# Patient Record
Sex: FEMALE | Race: BLACK OR AFRICAN AMERICAN | NOT HISPANIC OR LATINO | Employment: FULL TIME | URBAN - METROPOLITAN AREA
[De-identification: names, ages, dates, MRNs, and addresses within clinical notes are randomized per-mention and may not be internally consistent; named-entity substitution may affect disease eponyms.]

---

## 2018-10-08 ENCOUNTER — OFFICE VISIT (OUTPATIENT)
Dept: HEMATOLOGY/ONCOLOGY | Facility: CLINIC | Age: 43
End: 2018-10-08
Payer: COMMERCIAL

## 2018-10-08 ENCOUNTER — TELEPHONE (OUTPATIENT)
Dept: HEMATOLOGY/ONCOLOGY | Facility: CLINIC | Age: 43
End: 2018-10-08

## 2018-10-08 VITALS
TEMPERATURE: 98 F | HEART RATE: 60 BPM | WEIGHT: 244.81 LBS | RESPIRATION RATE: 20 BRPM | HEIGHT: 64 IN | SYSTOLIC BLOOD PRESSURE: 100 MMHG | DIASTOLIC BLOOD PRESSURE: 68 MMHG | BODY MASS INDEX: 41.79 KG/M2

## 2018-10-08 DIAGNOSIS — Z13.228 SCREENING FOR ENDOCRINE, NUTRITIONAL, METABOLIC AND IMMUNITY DISORDER: ICD-10-CM

## 2018-10-08 DIAGNOSIS — Z13.29 SCREENING FOR ENDOCRINE, NUTRITIONAL, METABOLIC AND IMMUNITY DISORDER: ICD-10-CM

## 2018-10-08 DIAGNOSIS — L50.9 HIVES OF UNKNOWN ORIGIN: ICD-10-CM

## 2018-10-08 DIAGNOSIS — D50.8 IRON DEFICIENCY ANEMIA SECONDARY TO INADEQUATE DIETARY IRON INTAKE: Primary | ICD-10-CM

## 2018-10-08 DIAGNOSIS — Z13.21 SCREENING FOR ENDOCRINE, NUTRITIONAL, METABOLIC AND IMMUNITY DISORDER: ICD-10-CM

## 2018-10-08 DIAGNOSIS — Z98.84 STATUS POST BARIATRIC SURGERY: ICD-10-CM

## 2018-10-08 DIAGNOSIS — Z13.0 SCREENING FOR ENDOCRINE, NUTRITIONAL, METABOLIC AND IMMUNITY DISORDER: ICD-10-CM

## 2018-10-08 DIAGNOSIS — D50.8 IRON DEFICIENCY ANEMIA FOLLOWING BARIATRIC SURGERY: ICD-10-CM

## 2018-10-08 DIAGNOSIS — K95.89 IRON DEFICIENCY ANEMIA FOLLOWING BARIATRIC SURGERY: ICD-10-CM

## 2018-10-08 PROCEDURE — 3008F BODY MASS INDEX DOCD: CPT | Mod: ,,, | Performed by: INTERNAL MEDICINE

## 2018-10-08 PROCEDURE — 99204 OFFICE O/P NEW MOD 45 MIN: CPT | Mod: ,,, | Performed by: INTERNAL MEDICINE

## 2018-10-08 RX ORDER — OLMESARTAN MEDOXOMIL AND HYDROCHLOROTHIAZIDE 40/25 40; 25 MG/1; MG/1
TABLET ORAL
Refills: 1 | COMMUNITY
Start: 2018-09-05 | End: 2018-12-14 | Stop reason: SDUPTHER

## 2018-10-08 RX ORDER — OLMESARTAN MEDOXOMIL AND HYDROCHLOROTHIAZIDE 40/25 40; 25 MG/1; MG/1
1 TABLET ORAL DAILY
COMMUNITY
Start: 2018-09-14 | End: 2019-02-15 | Stop reason: RX

## 2018-10-08 RX ORDER — ESOMEPRAZOLE MAGNESIUM 40 MG/1
CAPSULE, DELAYED RELEASE ORAL
COMMUNITY
End: 2019-03-04 | Stop reason: SDUPTHER

## 2018-10-08 RX ORDER — DIPHENHYDRAMINE HCL 25 MG
CAPSULE ORAL
COMMUNITY
Start: 2018-09-14 | End: 2019-02-26

## 2018-10-08 RX ORDER — PROPRANOLOL HYDROCHLORIDE 160 MG/1
CAPSULE, EXTENDED RELEASE ORAL
COMMUNITY
Start: 2018-09-14 | End: 2019-03-04 | Stop reason: SDUPTHER

## 2018-10-08 NOTE — LETTER
October 8, 2018      Ana See, FNP  [address]  [place] [address]           [facility] - Hematology Oncology  [address] [facility]  [address] [place] [address]  Phone: [phone]  Fax: [phone]          Patient: Kitty Jennings   MR Number: [id number]   YOB: 1975   Date of Visit: 10/8/2018       Dear Ana See:    Thank you for referring Kitty Jennings to me for evaluation. Attached you will find relevant portions of my assessment and plan of care.    If you have questions, please do not hesitate to call me. I look forward to following Kitty Jennings along with you.    Sincerely,    MARCIA Rolle MD    Enclosure  CC:  No Recipients    If you would like to receive this communication electronically, please contact [email or web address] or [phone] to request more information on [employer] Link access.    For providers and/or their staff who would like to refer a patient to [facility], please contact us through our one-stop-shop provider referral line, [facility], at [phone].    If you feel you have received this communication in error or would no longer like to receive these types of communications, please e-mail [email or web address]

## 2018-10-09 NOTE — PROGRESS NOTES
[facility] History & Physical    Subjective:      Patient ID:   Kitty Jennings  43 y.o. female  1975  Ana See NP      Chief Complaint:   Anemia eval.    HPI:  43 y.o. female with hx of Fe deficiency anemia, treated with oral Fe in the past.  Admits to fatigue.  Energy 6/10.  Works, goes home and goes to bed.  Intermittent hives since age 19.  Zertec and benadryl prn.     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  ETOH no. Allergy no.    Hx HTN, GERD, migraine HA.  Menses NL flow.    Gastric sleave surgery 13.    M0  Menses onset 11  1st live child at 22    No family hx of anemia.  Dad HTN, DM  Mom A & W  Sister HTN x's 2    ROS:   GEN: normal without any fever, night sweats or weight loss  HEENT: normal with no HA's, sore throat, stiff neck, changes in vision  CV: normal with no CP, SOB, PND, KAM or orthopnea  PULM: normal with no SOB, cough, hemoptysis, sputum or pleuritic pain  GI: normal with no abdominal pain, nausea, vomiting, constipation, diarrhea, melanotic stools, BRBPR, or hematemesis  : normal with no hematuria, dysuria  BREAST: normal with no mass, discharge, pain  SKIN: normal with no rash, erythema, bruising, or swelling      Review of patient's allergies indicates:  No Known Allergies  Social History     Socioeconomic History    Marital status: Unknown     Spouse name: Not on file    Number of children: Not on file    Years of education: Not on file    Highest education level: Not on file   Social Needs    Financial resource strain: Not on file    Food insecurity - worry: Not on file    Food insecurity - inability: Not on file    Transportation needs - medical: Not on file    Transportation needs - non-medical: Not on file   Occupational History    Not on file   Tobacco Use    Smoking status: Never Smoker    Smokeless tobacco: Never Used   Substance and Sexual Activity    Alcohol use: No     Frequency: Never    Drug use: No    Sexual activity: Not on  "file   Other Topics Concern    Not on file   Social History Narrative    Not on file         Current Outpatient Medications:     cetirizine (ZYRTEC) 10 mg Cap, Zyrtec 10 mg capsule  Take by oral route., Disp: , Rfl:     diphenhydrAMINE (BENADRYL) 25 mg capsule, Benadryl 25 mg capsule  Take 2 capsules every 4 hours by oral route., Disp: , Rfl:     esomeprazole (NEXIUM) 40 MG capsule, Nexium 40 mg capsule,delayed release  TAKE ONE CAPSULE BY MOUTH DAILY, Disp: , Rfl:     olmesartan-hydrochlorothiazide (BENICAR HCT) 40-25 mg per tablet, TK 1 T PO D., Disp: , Rfl: 1    olmesartan-hydrochlorothiazide (BENICAR HCT) 40-25 mg per tablet, , Disp: , Rfl:     propranolol (INDERAL LA) 160 mg 24 hr capsule, propranolol  mg capsule,24 hr,extended release, Disp: , Rfl:           Objective:   Vitals:  Blood pressure 100/68, pulse 60, temperature 98 °F (36.7 °C), resp. rate 20, height 5' 3.5" (1.613 m), weight 111 kg (244 lb 12.8 oz).    She did not aggree to physical exam.    Hgb8.7. Ferritin 10.  Assessment:   (1) 43 y.o. female with diagnosis of Fe deficiency 2nd decreased absorption 2nd bariatric surgery.  Discussed oral Fe or Injectafer  Weekly x's 2 to replenish Fe stores.  Estimated 1-2% risk of reaction   To Injectafer infusion.  Orders at [facility].  Check lab and RTC 2 months.    (2)Refer to Dr. Lori Chavez of allergy/ immunology to try clarify hives and Rx.    RTC 2 months.        1. Iron deficiency anemia secondary to inadequate dietary iron intake    2. Status post bariatric surgery    3. Screening for endocrine, nutritional, metabolic and immunity disorder    4. Hives of unknown origin        Plan:       Iron deficiency anemia secondary to inadequate dietary iron intake  -     Vitamin B12; Future; Expected date: 10/08/2018  -     Vitamin B6; Future; Expected date: 10/08/2018  -     Folate; Future; Expected date: 10/08/2018  -     TSH w/reflex to FT4; Future; Expected date: " 10/08/2018    Status post bariatric surgery  -     Vitamin B12; Future; Expected date: 10/08/2018  -     Vitamin B6; Future; Expected date: 10/08/2018  -     Folate; Future; Expected date: 10/08/2018  -     TSH w/reflex to FT4; Future; Expected date: 10/08/2018    Screening for endocrine, nutritional, metabolic and immunity disorder  -     Vitamin B12; Future; Expected date: 10/08/2018  -     Vitamin B6; Future; Expected date: 10/08/2018  -     Folate; Future; Expected date: 10/08/2018  -     TSH w/reflex to FT4; Future; Expected date: 10/08/2018    Hives of unknown origin      Follow-up in about 2 months (around 12/8/2018) for check of blood status after therapy.

## 2018-10-11 LAB
FOLATE SERPL-MCNC: 8.5 NG/ML
T4 FREE SERPL-MCNC: 1.01 NG/DL (ref 0.82–1.77)
TSH SERPL DL<=0.005 MIU/L-ACNC: 1.31 UIU/ML (ref 0.45–4.5)
VIT B12 SERPL-MCNC: 931 PG/ML (ref 232–1245)
VIT B6 SERPL-MCNC: 4.4 UG/L (ref 2–32.8)

## 2018-12-13 ENCOUNTER — OFFICE VISIT (OUTPATIENT)
Dept: HEMATOLOGY/ONCOLOGY | Facility: CLINIC | Age: 43
End: 2018-12-13
Payer: COMMERCIAL

## 2018-12-13 VITALS
SYSTOLIC BLOOD PRESSURE: 105 MMHG | BODY MASS INDEX: 39.7 KG/M2 | HEART RATE: 99 BPM | TEMPERATURE: 98 F | WEIGHT: 247 LBS | HEIGHT: 66 IN | DIASTOLIC BLOOD PRESSURE: 63 MMHG

## 2018-12-13 DIAGNOSIS — K95.89 IRON DEFICIENCY ANEMIA FOLLOWING BARIATRIC SURGERY: Primary | ICD-10-CM

## 2018-12-13 DIAGNOSIS — L50.8 HIVES, PHYSICAL: ICD-10-CM

## 2018-12-13 DIAGNOSIS — D50.8 IRON DEFICIENCY ANEMIA FOLLOWING BARIATRIC SURGERY: Primary | ICD-10-CM

## 2018-12-13 PROCEDURE — 3008F BODY MASS INDEX DOCD: CPT | Mod: ,,, | Performed by: INTERNAL MEDICINE

## 2018-12-13 PROCEDURE — 99214 OFFICE O/P EST MOD 30 MIN: CPT | Mod: ,,, | Performed by: INTERNAL MEDICINE

## 2018-12-13 NOTE — LETTER
December 15, 2018      Ana See, FNP  [address]  [place] [address]           [facility] Hematology Oncology  [address] [address]  [place] [address]  Phone: [phone]  Fax: [phone]          Patient: Kitty Jennings   MR Number: [id number]   YOB: 1975   Date of Visit: 12/13/2018       Dear Ana See:    Thank you for referring Kitty Jennings to me for evaluation. Attached you will find relevant portions of my assessment and plan of care.    If you have questions, please do not hesitate to call me. I look forward to following Kitty Jennings along with you.    Sincerely,    MARCIA Rolle MD    Enclosure  CC:  No Recipients    If you would like to receive this communication electronically, please contact [email or web address] or [phone] to request more information on [employer] Link access.    For providers and/or their staff who would like to refer a patient to [facility], please contact us through our one-stop-shop provider referral line, [facility], at [phone].    If you feel you have received this communication in error or would no longer like to receive these types of communications, please e-mail [email or web address]

## 2018-12-15 NOTE — PROGRESS NOTES
[facility] History & Physical    Subjective:      Patient ID:   Kitty Jennings  43 y.o. female  1975  Ana See NP      Chief Complaint:   Anemia eval.    HPI:  43 y.o. female with hx of Fe deficiency anemia, treated with oral Fe in the past.  Admits to fatigue.  Energy /10.  Works, goes home and goes to bed.  Intermittent hives since age 19.  Zertec and benadryl prn.    She completed Injectafer x's 2 weeks.  Stronger.  Hive sx have improved.     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 Allergy no.    Hx HTN, GERD, migraine HA.  Menses NL flow.    Gastric sleave surgery 13.    M0  Menses onset 11  1st live child at 22    No family hx of anemia.  Dad HTN, DM  Mom A & W  Sister HTN x's 2    ROS:   GEN: normal without any fever, night sweats or weight loss  HEENT: normal with no HA's, sore throat, stiff neck, changes in vision  CV: normal with no CP, SOB, PND, KAM or orthopnea  PULM: normal with no SOB, cough, hemoptysis, sputum or pleuritic pain  GI: normal with no abdominal pain, nausea, vomiting, constipation, diarrhea, melanotic stools, BRBPR, or hematemesis  : normal with no hematuria, dysuria  BREAST: normal with no mass, discharge, pain  SKIN: see HPI      Review of patient's allergies indicates:  No Known Allergies  Social History     Socioeconomic History    Marital status: Unknown     Spouse name: Not on file    Number of children: Not on file    Years of education: Not on file    Highest education level: Not on file   Social Needs    Financial resource strain: Not on file    Food insecurity - worry: Not on file    Food insecurity - inability: Not on file    Transportation needs - medical: Not on file    Transportation needs - non-medical: Not on file   Occupational History    Not on file   Tobacco Use    Smoking status: Never Smoker    Smokeless tobacco: Never Used   Substance and Sexual Activity    Alcohol use: No     Frequency: Never    Drug use:  "No    Sexual activity: Not on file   Other Topics Concern    Not on file   Social History Narrative    Not on file         Current Outpatient Medications:     cetirizine (ZYRTEC) 10 mg Cap, Zyrtec 10 mg capsule  Take by oral route., Disp: , Rfl:     diphenhydrAMINE (BENADRYL) 25 mg capsule, Benadryl 25 mg capsule  Take 2 capsules every 4 hours by oral route., Disp: , Rfl:     esomeprazole (NEXIUM) 40 MG capsule, Nexium 40 mg capsule,delayed release  TAKE ONE CAPSULE BY MOUTH DAILY, Disp: , Rfl:     olmesartan-hydrochlorothiazide (BENICAR HCT) 40-25 mg per tablet, , Disp: , Rfl:     propranolol (INDERAL LA) 160 mg 24 hr capsule, propranolol  mg capsule,24 hr,extended release, Disp: , Rfl:           Objective:   Vitals:  Blood pressure 105/63, pulse 99, temperature 98.2 °F (36.8 °C), height 5' 6" (1.676 m), weight 112 kg (247 lb).    She did not aggree to physical exam.    Hgb8.7. Ferritin 10.  Assessment:   (1) 43 y.o. female with diagnosis of Fe deficiency 2nd decreased absorption 2nd bariatric surgery.  Gave  Injectafer  Weekly x's 2 to replenish Fe stores.  Estimated 1-2% risk of reaction, she tolerated it well.  [facility].  Check lab and RTC 3 months.    (2)Refer to Dr. Lori Chavez of allergy/ immunology to try clarify hives and Rx.    RTC 3 months.        1. Iron deficiency anemia following bariatric surgery    2. Hives, physical        Plan:       Iron deficiency anemia following bariatric surgery  -     Ferritin; Future; Expected date: 12/13/2018  -     CBC auto differential; Future; Expected date: 12/13/2018    Hives, physical  -     Ambulatory Referral to Immunology      Follow-up in about 3 months (around 3/13/2019) for check of blood status after therapy.          "

## 2018-12-19 ENCOUNTER — DOCUMENTATION ONLY (OUTPATIENT)
Dept: FAMILY MEDICINE | Facility: CLINIC | Age: 43
End: 2018-12-19

## 2018-12-19 NOTE — PROGRESS NOTES
Pre-Visit Chart Review  For Appointment Scheduled on 12/21/2018    Health Maintenance Due   Topic Date Due    Lipid Panel  1975    TETANUS VACCINE  06/15/1993    Pap Smear with HPV Cotest  06/15/1996    Mammogram  06/15/2015    Influenza Vaccine  08/01/2018

## 2018-12-21 ENCOUNTER — OFFICE VISIT (OUTPATIENT)
Dept: FAMILY MEDICINE | Facility: CLINIC | Age: 43
End: 2018-12-21
Payer: COMMERCIAL

## 2018-12-21 VITALS
DIASTOLIC BLOOD PRESSURE: 71 MMHG | TEMPERATURE: 98 F | HEIGHT: 66 IN | HEART RATE: 55 BPM | WEIGHT: 244.69 LBS | BODY MASS INDEX: 39.32 KG/M2 | SYSTOLIC BLOOD PRESSURE: 108 MMHG

## 2018-12-21 DIAGNOSIS — Z12.31 VISIT FOR SCREENING MAMMOGRAM: ICD-10-CM

## 2018-12-21 DIAGNOSIS — I10 GOOD HYPERTENSION CONTROL: Primary | ICD-10-CM

## 2018-12-21 DIAGNOSIS — E66.01 SEVERE OBESITY (BMI 35.0-39.9) WITH COMORBIDITY: ICD-10-CM

## 2018-12-21 DIAGNOSIS — L50.9 HIVES OF UNKNOWN ORIGIN: ICD-10-CM

## 2018-12-21 PROCEDURE — 99204 OFFICE O/P NEW MOD 45 MIN: CPT | Mod: S$GLB,,, | Performed by: PHYSICIAN ASSISTANT

## 2018-12-21 PROCEDURE — 3008F BODY MASS INDEX DOCD: CPT | Mod: CPTII,S$GLB,, | Performed by: PHYSICIAN ASSISTANT

## 2018-12-21 PROCEDURE — 99999 PR PBB SHADOW E&M-EST. PATIENT-LVL IV: CPT | Mod: PBBFAC,,, | Performed by: PHYSICIAN ASSISTANT

## 2018-12-21 NOTE — PATIENT INSTRUCTIONS
Weight Management: Getting Started  Healthy bodies come in all shapes and sizes. Not all bodies are made to be thin. For some people, a healthy weight is higher than the average weight listed on weight charts. Your healthcare provider can help you decide on a healthy weight for you.    Reasons to lose weight  Losing weight can help with some health problems, such as high blood pressure, heart disease, diabetes, sleep apnea, and arthritis. You may also feel more energy.  Set your long-term goal  Your goal doesn't even have to be a specific weight. You may decide on a fitness goal (such as being able to walk 10 miles a week), or a health goal (such as lowering your blood pressure). Choose a goal that is measurable and reasonable, so you know when you've reached it. A goal of reaching a BMI of less than 25 is not always reasonable (or possible).   Make an action plan  Habits dont change overnight. Setting your goals too high can leave you feeling discouraged if you cant reach them. Be realistic. Choose one or two small changes you can make now. Set an action plan for how you are going to make these changes. When you can stick to this plan, keep making a few more small changes. Taking small steps will help you stay on the path to success.  Track your progress  Write down your goals. Then, keep a daily record of your progress. Write down what you eat and how active you are. This record lets you look back on how much youve done. It may also help when youre feeling frustrated. Reward yourself for success. Even if you dont reach every goal, give yourself credit for what you do get done.  Get support  Encouragement from others can help make losing weight easier. Ask your family members and friends for support. They may even want to join you. Also look to your healthcare provider, registered dietitian, and  for help. Your local hospital can give you more information about nutrition, exercise, and  weight loss.  Date Last Reviewed: 1/31/2016 © 2000-2017 [employer]. [address] [place] [address], [place] [address]. All rights reserved. This information is not intended as a substitute for professional medical care. Always follow your healthcare professional's instructions.        Walking for Fitness  Fitness walking has something for everyone, even people who are already fit. Walking is one of the safest ways to condition your body aerobically. It can boost energy, help you lose weight, and reduce stress.    Physical benefits  · Walking strengthens your heart and lungs, and tones your muscles.  · When walking, your feet land with less impact than in other sports. This reduces chances of muscle, bone, and joint injury.  · Regular walking improves your cholesterol levels and lowers your risk of heart disease. And it helps you control your blood sugar if you have diabetes.  · Walking is a weight-bearing activity, which helps maintain bone density. This can help prevent osteoporosis.  Personal rewards  · Taking walks can help you relax and manage stress. And fitness walking may make you feel better about yourself.  · Walking can help you sleep better at night and make you less likely to be depressed.  · Regular walking may help maintain your memory as you get older.  · Walking is a great way to spend extra time with friends and family members. Be sure to invite your dog along!  Q&A about fitness walking  Q: Will walking keep me fit?  A: Yes. Regular walking at the right pace gives you all the benefits of other aerobic activities, such as jogging and swimming.  Q: Will walking help me lose weight and keep it off?  A: Yes. Per mile, walking can burn as many calories as jogging. Your health care provider can help work walking into your weight-loss plan.  Q: Is walking safe for my health?  A: Yes. Walking is safe if you have high blood pressure, diabetes, heart disease, or other conditions. Talk to your  healthcare provider before you start.  Date Last Reviewed: 4/1/2017  © 1330-9176 [employer], [employer]. [address] [place] [address], [place] [address]. All rights reserved. This information is not intended as a substitute for professional medical care. Always follow your healthcare professional's instructions.

## 2018-12-21 NOTE — PROGRESS NOTES
Subjective:       Patient ID: Kitty Jennings is a 43 y.o. female.    Chief Complaint: Hypertension    Patient is a new patient in clinic.  She has she has had blood work recently with her previous PCP.  She declines all vaccines but does agree to a mammogram.  She states she has a GYN in [place] but does not remember his name and will get her last pap results      Current Outpatient Medications:     cetirizine (ZYRTEC) 10 mg Cap, Zyrtec 10 mg capsule  Take by oral route., Disp: , Rfl:     diphenhydrAMINE (BENADRYL) 25 mg capsule, Benadryl 25 mg capsule  Take 2 capsules every 4 hours by oral route., Disp: , Rfl:     esomeprazole (NEXIUM) 40 MG capsule, Nexium 40 mg capsule,delayed release  TAKE ONE CAPSULE BY MOUTH DAILY, Disp: , Rfl:     olmesartan-hydrochlorothiazide (BENICAR HCT) 40-25 mg per tablet, Take 1 tablet by mouth once daily. , Disp: , Rfl:     propranolol (INDERAL LA) 160 mg 24 hr capsule, propranolol  mg capsule,24 hr,extended release, Disp: , Rfl:     Past Medical History:  No date: Anemia  No date: Hives of unknown origin  No date: Hx of migraines  No date: Hypertension    Review of patient's family history indicates:  Problem: Hypertension      Relation: Mother          Age of Onset: (Not Specified)  Problem: Diabetes type II      Relation: Mother          Age of Onset: (Not Specified)  Problem: Diverticulosis      Relation: Mother          Age of Onset: (Not Specified)  Problem: Hypertension      Relation: Father          Age of Onset: (Not Specified)  Problem: Heart disease      Relation: Father          Age of Onset: (Not Specified)  Problem: Diabetes type II      Relation: Father          Age of Onset: (Not Specified)  Problem: Hypertension      Relation: Sister          Age of Onset: (Not Specified)  Problem: Hypertension      Relation: Brother          Age of Onset: (Not Specified)  Problem: Ovarian cancer      Relation: Sister          Age of Onset: (Not Specified)  Problem: No  Known Problems      Relation: Sister          Age of Onset: (Not Specified)  Problem: No Known Problems      Relation: Daughter          Age of Onset: (Not Specified)    Past Surgical History:  No date: BREAST SURGERY      Comment:  reduction  No date: gastric sleeve  No date: lipoma removal      Review of Systems   Constitutional: Negative for activity change, appetite change, chills, fatigue, fever and unexpected weight change.   HENT: Negative for congestion, dental problem, hearing loss, postnasal drip, rhinorrhea, sinus pressure and trouble swallowing.    Eyes: Negative for photophobia, discharge and visual disturbance.   Respiratory: Negative for cough, chest tightness, shortness of breath and wheezing.    Cardiovascular: Negative for chest pain, palpitations and leg swelling.   Gastrointestinal: Negative for abdominal pain, blood in stool, constipation, diarrhea, nausea and vomiting.   Genitourinary: Negative for decreased urine volume, difficulty urinating, dyspareunia, dysuria, flank pain, frequency, genital sores, hematuria, menstrual problem, pelvic pain, urgency, vaginal bleeding, vaginal discharge and vaginal pain.   Musculoskeletal: Negative for arthralgias, back pain, joint swelling and neck pain.   Skin: Negative for color change and rash.   Neurological: Negative for dizziness, seizures, weakness, light-headedness, numbness and headaches.   Hematological: Does not bruise/bleed easily.   Psychiatric/Behavioral: Negative for sleep disturbance and suicidal ideas. The patient is not nervous/anxious.        Objective:      Physical Exam   Constitutional: She is oriented to person, place, and time. She appears well-developed and well-nourished.   HENT:   Head: Normocephalic and atraumatic.   Eyes: Conjunctivae are normal. Pupils are equal, round, and reactive to light.   Neck: Normal range of motion. Neck supple. No JVD present.   Cardiovascular: Normal rate and regular rhythm. Exam reveals no gallop and  no friction rub.   No murmur heard.  Pulmonary/Chest: Effort normal and breath sounds normal. No respiratory distress. She has no wheezes. She has no rales.   Neurological: She is alert and oriented to person, place, and time.   Skin: Skin is warm and dry.   Psychiatric: She has a normal mood and affect. Her behavior is normal. Judgment and thought content normal.       Assessment:       1. Visit for screening mammogram    2. Good hypertension control    3. Hives of unknown origin        Plan:       Kitty was seen today for hypertension.    Diagnoses and all orders for this visit:    Good hypertension control  Comments:  controlled, continue meds    Hives of unknown origin  Comments:  stable, Zantac at night and discontinue Zyrtec usage.  Benadryl as needed    Severe obesity (BMI 35.0-39.9) with comorbidity  Comments:  uncontrolled, encouraged diet    Visit for screening mammogram  -     Mammo Digital Screening Bilateral With CAD; Future    Patient readiness: acceptance and barriers:none    During the course of the visit the patient was educated and counseled about the following:     Hypertension:   Regular aerobic exercise.  Obesity:   General weight loss/lifestyle modification strategies discussed (elicit support from others; identify saboteurs; non-food rewards, etc).    Goals: Hypertension: Reduce Blood Pressure and Obesity: Reduce calorie intake and BMI    Did patient meet goals/outcomes: No    The following self management tools provided: blood pressure log  excercise log    Patient Instructions (the written plan) was given to the patient/family.     Time spent with patient: 45 minutes    Barriers to medications present (no )    Adverse reactions to current medications (no)    Over the counter medications reviewed (Yes)

## 2018-12-26 ENCOUNTER — HOSPITAL ENCOUNTER (OUTPATIENT)
Dept: RADIOLOGY | Facility: CLINIC | Age: 43
Discharge: HOME | End: 2018-12-26
Attending: PHYSICIAN ASSISTANT
Payer: COMMERCIAL

## 2018-12-26 DIAGNOSIS — Z12.31 VISIT FOR SCREENING MAMMOGRAM: ICD-10-CM

## 2018-12-26 PROCEDURE — 77067 SCR MAMMO BI INCL CAD: CPT | Mod: 26,,, | Performed by: RADIOLOGY

## 2018-12-26 PROCEDURE — 77063 BREAST TOMOSYNTHESIS BI: CPT | Mod: 26,,, | Performed by: RADIOLOGY

## 2018-12-26 PROCEDURE — 77067 SCR MAMMO BI INCL CAD: CPT | Mod: TC,PO

## 2018-12-26 PROCEDURE — 77063 BREAST TOMOSYNTHESIS BI: CPT | Mod: TC,PO

## 2018-12-27 DIAGNOSIS — N63.10 BREAST MASS, RIGHT: ICD-10-CM

## 2018-12-27 DIAGNOSIS — R92.8 ABNORMAL MAMMOGRAM: Primary | ICD-10-CM

## 2019-01-03 ENCOUNTER — OFFICE VISIT (OUTPATIENT)
Dept: ALLERGY | Facility: CLINIC | Age: 44
End: 2019-01-03
Payer: COMMERCIAL

## 2019-01-03 VITALS
BODY MASS INDEX: 44.12 KG/M2 | WEIGHT: 249 LBS | SYSTOLIC BLOOD PRESSURE: 128 MMHG | DIASTOLIC BLOOD PRESSURE: 76 MMHG | HEIGHT: 63 IN

## 2019-01-03 DIAGNOSIS — N63.0 BREAST MASS IN FEMALE: ICD-10-CM

## 2019-01-03 DIAGNOSIS — L50.8 CHRONIC URTICARIA: Primary | ICD-10-CM

## 2019-01-03 DIAGNOSIS — T78.3XXA ANGIOEDEMA, INITIAL ENCOUNTER: ICD-10-CM

## 2019-01-03 PROCEDURE — 99244 PR OFFICE CONSULTATION,LEVEL IV: ICD-10-PCS | Mod: ,,, | Performed by: ALLERGY & IMMUNOLOGY

## 2019-01-03 PROCEDURE — 99244 OFF/OP CNSLTJ NEW/EST MOD 40: CPT | Mod: ,,, | Performed by: ALLERGY & IMMUNOLOGY

## 2019-01-03 RX ORDER — CETIRIZINE HYDROCHLORIDE 10 MG/1
10 TABLET ORAL 2 TIMES DAILY
Qty: 60 TABLET | Refills: 3 | Status: SHIPPED | OUTPATIENT
Start: 2019-01-03 | End: 2019-06-23 | Stop reason: SDUPTHER

## 2019-01-03 RX ORDER — DOXEPIN HYDROCHLORIDE 10 MG/1
10 CAPSULE ORAL NIGHTLY PRN
Qty: 30 CAPSULE | Refills: 2 | Status: SHIPPED | OUTPATIENT
Start: 2019-01-03 | End: 2019-05-16

## 2019-01-03 NOTE — PATIENT INSTRUCTIONS
Park in the parking garage   Then enter on 2nd floor.   First office on the right = [address].     Start high does antihistamines.     Call [phone] push [phone].

## 2019-01-03 NOTE — LETTER
January 3, 2019      MARCIA Rolle MD  [address]  [address]  [place] [address]           [facility] - Allergy  [address]  [address]  [place] [address]  Phone: [phone]  Fax: [phone]          Patient: Kitty Jennings   MR Number: [id number]   YOB: 1975   Date of Visit: 1/3/2019       Dear Dr. MARCIA Rolle:    Thank you for referring Kitty Jennings to me for evaluation. Attached you will find relevant portions of my assessment and plan of care.    If you have questions, please do not hesitate to call me. I look forward to following Kitty Jennings along with you.    Sincerely,    Lori Chavez MD    Enclosure  CC:  No Recipients    If you would like to receive this communication electronically, please contact [email or web address] or [phone] to request more information on [employer] access.    For providers and/or their staff who would like to refer a patient to [facility], please contact us through our one-stop-shop provider referral line, [facility], at [phone].    If you feel you have received this communication in error or would no longer like to receive these types of communications, please e-mail [email or web address]

## 2019-01-03 NOTE — PROGRESS NOTES
"Subjective:       Patient ID: Kitty Jennings is a 43 y.o. female.    Chief Complaint: Urticaria (chronic hives - referred from Dr. Rolle)    HPI     Pt presents as a consult from Dr. Rolle for urticaria.     Started in her teenage years  Associated facial swelling with hives  Location generalized  Frequency comes and goes  Typically will go away after a few months  Recently started working for [employer] x 8 years.   Most recently, for the past 8 years, more frequent.   She would feel a deep itch.   Tx: benadryl daily.     Saw another allergist: Dr. Waldron 5 years ago.   Prior had allergy testing   Serum and skin test was negative.         Review of Systems      General: neg unexpected weight changes, fevers, chills, night sweats, malaise  HEENT: see hpi, Neg eye pain, vision changes, ear drainage, nose bleeds, throat tightness, sores in the mouth  CV: Neg chest pain, palpitations, swelling  Resp: see hpi, neg shortness of breath, hemoptysis, cough  GI: see hpi, neg dysphagia, night abdominal pain, reflux, chronic diarrhea, chronic constipation  Derm: See Hpi, neg new rash, neg flushing  Mu/sk: Neg joint pain, joint swelling   Psych: Neg anxiety  neuro: neg chronic headaches, muscle weakness  Endo: neg heat/cold intolerance, chronic fatigue    Objective:     Vitals:    01/03/19 1524   BP: 128/76   Weight: 112.9 kg (249 lb)   Height: 5' 3" (1.6 m)        Physical Exam      General: no acute distress, well developed well nourished   HEENT:   Head:normocephalic atraumatic  Eyes: DUSTY, EOMI, Neg injection, scleral icterus, or conjunctival papillary hypertrophy.  Ears: tm clear bilaterally, normal canal  Nose: nares patent   OP: mucus membranes moist, - cobblestoning, - PND, neg erythema or lesions  Neck: supple, Full range of motion, neg lymphadenopathy  Chest: full respiratory excursion no abnormal chest abnormality  Resp: clear to ascultation bilaterally  CV: RRR, neg MRG, brisk capillary refill  Abdomen: BS+, non " tender, non distended  Ext:  Neg clubbing, cyanosis, pitting edema  Skin: urticaria on left bicep  Lymph: neg supraclavicular, axillary     Assessment:       1. Chronic urticaria    2. Breast mass in female    3. Angioedema, initial encounter        Plan:       Chronic urticaria  -     doxepin (SINEQUAN) 10 MG capsule; Take 1 capsule (10 mg total) by mouth nightly as needed (breakthrough itch).  Dispense: 30 capsule; Refill: 2  -     cetirizine (ZYRTEC) 10 MG tablet; Take 1 tablet (10 mg total) by mouth 2 (two) times daily.  Dispense: 60 tablet; Refill: 3  -     ranitidine (ZANTAC) 150 MG tablet; Take 1 tablet (150 mg total) by mouth 2 (two) times daily.  Dispense: 60 tablet; Refill: 3    Breast mass in female    Angioedema, initial encounter    explained urticaria and action plan and education.   Consider xolair if her breast mass is not cancerous, if it is, will consult Dr. Rolle on his opinoin.     Start high dose antihistamines  Consider montelukast    Follow up in 4 weeks.         Lori Chavez M.D.  Allergy/Immunology  [facility]   [phone] phone  [phone] fax

## 2019-01-08 ENCOUNTER — HOSPITAL ENCOUNTER (OUTPATIENT)
Dept: RADIOLOGY | Facility: HOSPITAL | Age: 44
Discharge: HOME | End: 2019-01-08
Attending: PHYSICIAN ASSISTANT
Payer: COMMERCIAL

## 2019-01-08 DIAGNOSIS — R92.8 ABNORMAL MAMMOGRAM: ICD-10-CM

## 2019-01-08 DIAGNOSIS — N63.10 BREAST MASS, RIGHT: ICD-10-CM

## 2019-01-08 DIAGNOSIS — N63.10 BREAST MASS, RIGHT: Primary | ICD-10-CM

## 2019-01-08 PROCEDURE — 77061 BREAST TOMOSYNTHESIS UNI: CPT | Mod: 26,,, | Performed by: RADIOLOGY

## 2019-01-08 PROCEDURE — 76642 ULTRASOUND BREAST LIMITED: CPT | Mod: 26,RT,, | Performed by: RADIOLOGY

## 2019-01-08 PROCEDURE — 77061 BREAST TOMOSYNTHESIS UNI: CPT | Mod: TC

## 2019-01-08 PROCEDURE — 76642 ULTRASOUND BREAST LIMITED: CPT | Mod: TC,RT

## 2019-01-08 PROCEDURE — 77065 MAMMO DIGITAL DIAGNOSTIC RIGHT WITH TOMOSYNTHESIS_CAD: ICD-10-PCS | Mod: 26,,, | Performed by: RADIOLOGY

## 2019-01-08 PROCEDURE — 77065 DX MAMMO INCL CAD UNI: CPT | Mod: 26,,, | Performed by: RADIOLOGY

## 2019-01-08 PROCEDURE — 77061 MAMMO DIGITAL DIAGNOSTIC RIGHT WITH TOMOSYNTHESIS_CAD: ICD-10-PCS | Mod: 26,,, | Performed by: RADIOLOGY

## 2019-01-08 PROCEDURE — 77065 DX MAMMO INCL CAD UNI: CPT | Mod: TC

## 2019-01-08 PROCEDURE — 76642 US BREAST RIGHT LIMITED: ICD-10-PCS | Mod: 26,RT,, | Performed by: RADIOLOGY

## 2019-01-28 ENCOUNTER — OFFICE VISIT (OUTPATIENT)
Dept: HEMATOLOGY/ONCOLOGY | Facility: CLINIC | Age: 44
End: 2019-01-28
Payer: COMMERCIAL

## 2019-01-28 VITALS
WEIGHT: 248.31 LBS | HEART RATE: 55 BPM | SYSTOLIC BLOOD PRESSURE: 104 MMHG | BODY MASS INDEX: 43.98 KG/M2 | DIASTOLIC BLOOD PRESSURE: 67 MMHG | RESPIRATION RATE: 20 BRPM | TEMPERATURE: 99 F

## 2019-01-28 DIAGNOSIS — E53.1 PYRIDOXINE DEFICIENCY: ICD-10-CM

## 2019-01-28 DIAGNOSIS — K95.89 IRON DEFICIENCY ANEMIA FOLLOWING BARIATRIC SURGERY: Primary | ICD-10-CM

## 2019-01-28 DIAGNOSIS — D50.8 IRON DEFICIENCY ANEMIA FOLLOWING BARIATRIC SURGERY: Primary | ICD-10-CM

## 2019-01-28 PROCEDURE — 3008F BODY MASS INDEX DOCD: CPT | Mod: ,,, | Performed by: INTERNAL MEDICINE

## 2019-01-28 PROCEDURE — 99214 PR OFFICE/OUTPT VISIT, EST, LEVL IV, 30-39 MIN: ICD-10-PCS | Mod: ,,, | Performed by: INTERNAL MEDICINE

## 2019-01-28 PROCEDURE — 3008F PR BODY MASS INDEX (BMI) DOCUMENTED: ICD-10-PCS | Mod: ,,, | Performed by: INTERNAL MEDICINE

## 2019-01-28 PROCEDURE — 99214 OFFICE O/P EST MOD 30 MIN: CPT | Mod: ,,, | Performed by: INTERNAL MEDICINE

## 2019-01-28 NOTE — LETTER
January 28, 2019      ROMAN Villa  [address]  [place] [address]           [facility]  [address]  [address]  [place] [address]  Phone: [phone]  Fax: [phone]          Patient: Kitty Jennings   MR Number: [id number]   YOB: 1975   Date of Visit: 1/28/2019       Dear Olga Holland:    Thank you for referring Kitty Jennings to me for evaluation. Attached you will find relevant portions of my assessment and plan of care.    If you have questions, please do not hesitate to call me. I look forward to following Kitty Jennings along with you.    Sincerely,    MARCIA Rolle MD    Enclosure  CC:  No Recipients    If you would like to receive this communication electronically, please contact [email or web address] or [phone] to request more information on [employer] Link access.    For providers and/or their staff who would like to refer a patient to [facility], please contact us through our one-stop-shop provider referral line, [facility], at [phone].    If you feel you have received this communication in error or would no longer like to receive these types of communications, please e-mail [email or web address]

## 2019-01-30 ENCOUNTER — PATIENT MESSAGE (OUTPATIENT)
Dept: HEMATOLOGY/ONCOLOGY | Facility: CLINIC | Age: 44
End: 2019-01-30

## 2019-02-06 LAB
BASOPHILS # BLD AUTO: 0 X10E3/UL (ref 0–0.2)
BASOPHILS NFR BLD AUTO: 0 %
EOSINOPHIL # BLD AUTO: 0.1 X10E3/UL (ref 0–0.4)
EOSINOPHIL NFR BLD AUTO: 3 %
ERYTHROCYTE [DISTWIDTH] IN BLOOD BY AUTOMATED COUNT: 14.7 % (ref 12.3–15.4)
FERRITIN SERPL-MCNC: 263 NG/ML (ref 15–150)
HCT VFR BLD AUTO: 36.9 % (ref 34–46.6)
HGB BLD-MCNC: 12.3 G/DL (ref 11.1–15.9)
IMM GRANULOCYTES # BLD AUTO: 0 X10E3/UL (ref 0–0.1)
IMM GRANULOCYTES NFR BLD AUTO: 0 %
LYMPHOCYTES # BLD AUTO: 2.1 X10E3/UL (ref 0.7–3.1)
LYMPHOCYTES NFR BLD AUTO: 43 %
MCH RBC QN AUTO: 29.1 PG (ref 26.6–33)
MCHC RBC AUTO-ENTMCNC: 33.3 G/DL (ref 31.5–35.7)
MCV RBC AUTO: 87 FL (ref 79–97)
MONOCYTES # BLD AUTO: 0.4 X10E3/UL (ref 0.1–0.9)
MONOCYTES NFR BLD AUTO: 9 %
NEUTROPHILS # BLD AUTO: 2.2 X10E3/UL (ref 1.4–7)
NEUTROPHILS NFR BLD AUTO: 45 %
PLATELET # BLD AUTO: 247 X10E3/UL (ref 150–379)
RBC # BLD AUTO: 4.22 X10E6/UL (ref 3.77–5.28)
VIT B6 SERPL-MCNC: 57.7 UG/L (ref 2–32.8)
WBC # BLD AUTO: 4.8 X10E3/UL (ref 3.4–10.8)

## 2019-02-07 ENCOUNTER — PATIENT MESSAGE (OUTPATIENT)
Dept: HEMATOLOGY/ONCOLOGY | Facility: CLINIC | Age: 44
End: 2019-02-07

## 2019-02-13 ENCOUNTER — PATIENT MESSAGE (OUTPATIENT)
Dept: FAMILY MEDICINE | Facility: CLINIC | Age: 44
End: 2019-02-13

## 2019-02-13 DIAGNOSIS — I10 HYPERTENSION, UNSPECIFIED TYPE: Primary | ICD-10-CM

## 2019-02-14 ENCOUNTER — OFFICE VISIT (OUTPATIENT)
Dept: ALLERGY | Facility: CLINIC | Age: 44
End: 2019-02-14
Payer: COMMERCIAL

## 2019-02-14 VITALS
WEIGHT: 250 LBS | HEIGHT: 63 IN | BODY MASS INDEX: 44.3 KG/M2 | DIASTOLIC BLOOD PRESSURE: 80 MMHG | SYSTOLIC BLOOD PRESSURE: 136 MMHG

## 2019-02-14 DIAGNOSIS — L50.8 CHRONIC URTICARIA: Primary | ICD-10-CM

## 2019-02-14 DIAGNOSIS — T78.3XXD ANGIOEDEMA, SUBSEQUENT ENCOUNTER: ICD-10-CM

## 2019-02-14 DIAGNOSIS — N63.0 BREAST MASS IN FEMALE: ICD-10-CM

## 2019-02-14 PROCEDURE — 3008F BODY MASS INDEX DOCD: CPT | Mod: ,,, | Performed by: ALLERGY & IMMUNOLOGY

## 2019-02-14 PROCEDURE — 3008F PR BODY MASS INDEX (BMI) DOCUMENTED: ICD-10-PCS | Mod: ,,, | Performed by: ALLERGY & IMMUNOLOGY

## 2019-02-14 PROCEDURE — 99213 PR OFFICE/OUTPT VISIT, EST, LEVL III, 20-29 MIN: ICD-10-PCS | Mod: ,,, | Performed by: ALLERGY & IMMUNOLOGY

## 2019-02-14 PROCEDURE — 99213 OFFICE O/P EST LOW 20 MIN: CPT | Mod: ,,, | Performed by: ALLERGY & IMMUNOLOGY

## 2019-02-14 NOTE — PROGRESS NOTES
"Subjective:       Patient ID: Kitty Jennings is a 43 y.o. female.    Chief Complaint: Urticaria (still having hives approximately weekly)    HPI     Pt presents for urticaria.     At the last visit, she was started on doxepin, zyrtec bid, and zantac bid.   We are considering xolair if her breast mass is not cancerous. No calcified material. Does not seem to be cancerous at this time.     Condition: stable- mildly better.   Started in her teenage years  Associated facial swelling with hives, last week facial swelling.   Pt does mention a temporal association with eating shrimp of lip swelling and generalized urticaria. This was about 2-3 hours post ingestion.   Location generalized  Frequency comes and goes- currently daily   Typically will go away after a few months  Recently started working for [employer] x 8 years.   Most recently, for the past 8 years, more frequent.   She would feel a deep itch.   Tx: zyrtec 20 mg in the morning, doxepin prn- takes when very exacerbated.  Nothing at night. No zantac currently.     Saw another allergist: Dr. Waldron 5 years ago.   Prior had allergy testing   Serum and skin test was negative.         Review of Systems      General: neg unexpected weight changes, fevers, chills, night sweats, malaise  HEENT: see hpi, Neg eye pain, vision changes, ear drainage, nose bleeds, throat tightness, sores in the mouth  CV: Neg chest pain, palpitations, swelling  Resp: see hpi, neg shortness of breath, hemoptysis, cough  GI: see hpi, neg dysphagia, night abdominal pain, reflux, chronic diarrhea, chronic constipation  Derm: See Hpi,  neg flushing  Mu/sk: Neg joint pain, joint swelling   Psych: Neg anxiety  neuro: neg chronic headaches, muscle weakness  Endo: neg heat/cold intolerance, chronic fatigue    Objective:     Vitals:    02/14/19 0846   BP: 136/80   Weight: 113.4 kg (250 lb)   Height: 5' 3" (1.6 m)        Physical Exam      General: no acute distress, well developed well nourished "   HEENT:   Head:normocephalic atraumatic  Eyes: DUSTY, EOMI, Neg injection, scleral icterus, or conjunctival papillary hypertrophy.  Ears: tm clear bilaterally, normal canal  Nose: nares patent   OP: mucus membranes moist, - cobblestoning, - PND, neg erythema or lesions  Neck: supple, Full range of motion, neg lymphadenopathy  Chest: full respiratory excursion no abnormal chest abnormality  Resp: clear to ascultation bilaterally  CV: RRR, neg MRG, brisk capillary refill  Abdomen: BS+, non tender, non distended  Ext:  Neg clubbing, cyanosis, pitting edema  Skin: neg lesions   Lymph: neg supraclavicular, axillary     Assessment:       1. Chronic urticaria    2. Breast mass in female    3. Angioedema, subsequent encounter        Plan:       Chronic urticaria    Breast mass in female    Angioedema, subsequent encounter    explained urticaria and action plan and education.   Consider xolair, currently controlled on 20 mg zyrtec q am.   continue high dose antihistamines  Consider montelukast    Follow up in 6 months.         Lori Chavez M.D.  Allergy/Immunology  [facility]   [phone] phone  [phone] fax

## 2019-02-14 NOTE — PATIENT INSTRUCTIONS
Continue current regimen.   You may consider 1 pill twice per day     If losing control, increase to 1 zyrtec twice per day and zantac 1 pill twice per day.     Please call if having issues.

## 2019-02-15 DIAGNOSIS — I10 HYPERTENSION, UNSPECIFIED TYPE: ICD-10-CM

## 2019-02-15 RX ORDER — HYDROCHLOROTHIAZIDE 25 MG/1
25 TABLET ORAL DAILY
Qty: 30 TABLET | Refills: 11 | Status: SHIPPED | OUTPATIENT
Start: 2019-02-15 | End: 2019-05-16

## 2019-02-15 RX ORDER — OLMESARTAN MEDOXOMIL 40 MG/1
40 TABLET ORAL DAILY
Qty: 90 TABLET | Refills: 3 | Status: SHIPPED | OUTPATIENT
Start: 2019-02-15 | End: 2020-10-02 | Stop reason: SDUPTHER

## 2019-02-15 RX ORDER — OLMESARTAN MEDOXOMIL 40 MG/1
40 TABLET ORAL DAILY
Qty: 90 TABLET | Refills: 3 | Status: SHIPPED | OUTPATIENT
Start: 2019-02-15 | End: 2019-02-15 | Stop reason: SDUPTHER

## 2019-02-15 NOTE — TELEPHONE ENCOUNTER
----- Message from Elena Llamas sent at 2/15/2019 12:07 PM CST -----  Contact: patient  Type: Needs Medical Advice    Who Called:  patient  Best Call Back Number: [phone] (home) [phone] (work)  Additional Information: the Rx she got is on back order for [place] [facility] would like a call back said  wrote the Rx for the same would like a call back want to know if she cant get something else sent in

## 2019-02-15 NOTE — TELEPHONE ENCOUNTER
Please call and let patient know that I have sent in plain benicar and separate HCTZ.  This should be available

## 2019-02-15 NOTE — TELEPHONE ENCOUNTER
I  Called several pharmacies and they are out of olmesartan 40 mg.  Patient needs to be changed to something else.

## 2019-02-25 ENCOUNTER — OFFICE VISIT (OUTPATIENT)
Dept: FAMILY MEDICINE | Facility: CLINIC | Age: 44
End: 2019-02-25
Payer: COMMERCIAL

## 2019-02-25 ENCOUNTER — LAB VISIT (OUTPATIENT)
Dept: LAB | Facility: HOSPITAL | Age: 44
End: 2019-02-25
Attending: PHYSICIAN ASSISTANT
Payer: COMMERCIAL

## 2019-02-25 ENCOUNTER — DOCUMENTATION ONLY (OUTPATIENT)
Dept: FAMILY MEDICINE | Facility: CLINIC | Age: 44
End: 2019-02-25

## 2019-02-25 VITALS
HEART RATE: 45 BPM | DIASTOLIC BLOOD PRESSURE: 70 MMHG | WEIGHT: 256.19 LBS | BODY MASS INDEX: 45.39 KG/M2 | TEMPERATURE: 98 F | HEIGHT: 63 IN | SYSTOLIC BLOOD PRESSURE: 142 MMHG

## 2019-02-25 DIAGNOSIS — R00.2 PALPITATIONS: ICD-10-CM

## 2019-02-25 DIAGNOSIS — R11.0 NAUSEA: Primary | ICD-10-CM

## 2019-02-25 DIAGNOSIS — A08.4 VIRAL GASTROENTERITIS: ICD-10-CM

## 2019-02-25 DIAGNOSIS — R94.31 ABNORMAL EKG: ICD-10-CM

## 2019-02-25 LAB
ALBUMIN SERPL BCP-MCNC: 3.3 G/DL
ALP SERPL-CCNC: 64 U/L
ALT SERPL W/O P-5'-P-CCNC: 12 U/L
ANION GAP SERPL CALC-SCNC: 8 MMOL/L
AST SERPL-CCNC: 15 U/L
BASOPHILS # BLD AUTO: 0.01 K/UL
BASOPHILS NFR BLD: 0.2 %
BILIRUB SERPL-MCNC: 0.8 MG/DL
BUN SERPL-MCNC: 6 MG/DL
CALCIUM SERPL-MCNC: 9.4 MG/DL
CHLORIDE SERPL-SCNC: 105 MMOL/L
CO2 SERPL-SCNC: 28 MMOL/L
CREAT SERPL-MCNC: 0.7 MG/DL
DIFFERENTIAL METHOD: ABNORMAL
EOSINOPHIL # BLD AUTO: 0.1 K/UL
EOSINOPHIL NFR BLD: 1.2 %
ERYTHROCYTE [DISTWIDTH] IN BLOOD BY AUTOMATED COUNT: 12.8 %
EST. GFR  (AFRICAN AMERICAN): >60 ML/MIN/1.73 M^2
EST. GFR  (NON AFRICAN AMERICAN): >60 ML/MIN/1.73 M^2
GLUCOSE SERPL-MCNC: 75 MG/DL
HCT VFR BLD AUTO: 36.8 %
HGB BLD-MCNC: 12.1 G/DL
IMM GRANULOCYTES # BLD AUTO: 0.01 K/UL
IMM GRANULOCYTES NFR BLD AUTO: 0.2 %
LYMPHOCYTES # BLD AUTO: 1.4 K/UL
LYMPHOCYTES NFR BLD: 23.5 %
MAGNESIUM SERPL-MCNC: 1.7 MG/DL
MCH RBC QN AUTO: 30 PG
MCHC RBC AUTO-ENTMCNC: 32.9 G/DL
MCV RBC AUTO: 91 FL
MONOCYTES # BLD AUTO: 0.4 K/UL
MONOCYTES NFR BLD: 6.5 %
NEUTROPHILS # BLD AUTO: 4 K/UL
NEUTROPHILS NFR BLD: 68.4 %
NRBC BLD-RTO: 0 /100 WBC
PLATELET # BLD AUTO: 237 K/UL
PMV BLD AUTO: 11.4 FL
POTASSIUM SERPL-SCNC: 4.1 MMOL/L
PROT SERPL-MCNC: 6.8 G/DL
RBC # BLD AUTO: 4.03 M/UL
SODIUM SERPL-SCNC: 141 MMOL/L
TSH SERPL DL<=0.005 MIU/L-ACNC: 0.67 UIU/ML
WBC # BLD AUTO: 5.82 K/UL

## 2019-02-25 PROCEDURE — 3008F BODY MASS INDEX DOCD: CPT | Mod: CPTII,S$GLB,, | Performed by: PHYSICIAN ASSISTANT

## 2019-02-25 PROCEDURE — 3077F PR MOST RECENT SYSTOLIC BLOOD PRESSURE >= 140 MM HG: ICD-10-PCS | Mod: CPTII,S$GLB,, | Performed by: PHYSICIAN ASSISTANT

## 2019-02-25 PROCEDURE — 80053 COMPREHEN METABOLIC PANEL: CPT

## 2019-02-25 PROCEDURE — 3078F DIAST BP <80 MM HG: CPT | Mod: CPTII,S$GLB,, | Performed by: PHYSICIAN ASSISTANT

## 2019-02-25 PROCEDURE — 99999 PR PBB SHADOW E&M-EST. PATIENT-LVL IV: ICD-10-PCS | Mod: PBBFAC,,, | Performed by: PHYSICIAN ASSISTANT

## 2019-02-25 PROCEDURE — 83735 ASSAY OF MAGNESIUM: CPT

## 2019-02-25 PROCEDURE — 3078F PR MOST RECENT DIASTOLIC BLOOD PRESSURE < 80 MM HG: ICD-10-PCS | Mod: CPTII,S$GLB,, | Performed by: PHYSICIAN ASSISTANT

## 2019-02-25 PROCEDURE — 93000 ELECTROCARDIOGRAM COMPLETE: CPT | Mod: S$GLB,,, | Performed by: INTERNAL MEDICINE

## 2019-02-25 PROCEDURE — 85025 COMPLETE CBC W/AUTO DIFF WBC: CPT

## 2019-02-25 PROCEDURE — 93000 EKG 12-LEAD: ICD-10-PCS | Mod: S$GLB,,, | Performed by: INTERNAL MEDICINE

## 2019-02-25 PROCEDURE — 99213 OFFICE O/P EST LOW 20 MIN: CPT | Mod: S$GLB,,, | Performed by: PHYSICIAN ASSISTANT

## 2019-02-25 PROCEDURE — 3077F SYST BP >= 140 MM HG: CPT | Mod: CPTII,S$GLB,, | Performed by: PHYSICIAN ASSISTANT

## 2019-02-25 PROCEDURE — 3008F PR BODY MASS INDEX (BMI) DOCUMENTED: ICD-10-PCS | Mod: CPTII,S$GLB,, | Performed by: PHYSICIAN ASSISTANT

## 2019-02-25 PROCEDURE — 36415 COLL VENOUS BLD VENIPUNCTURE: CPT | Mod: PO

## 2019-02-25 PROCEDURE — 84443 ASSAY THYROID STIM HORMONE: CPT

## 2019-02-25 PROCEDURE — 99213 PR OFFICE/OUTPT VISIT, EST, LEVL III, 20-29 MIN: ICD-10-PCS | Mod: S$GLB,,, | Performed by: PHYSICIAN ASSISTANT

## 2019-02-25 PROCEDURE — 99999 PR PBB SHADOW E&M-EST. PATIENT-LVL IV: CPT | Mod: PBBFAC,,, | Performed by: PHYSICIAN ASSISTANT

## 2019-02-25 RX ORDER — ONDANSETRON 4 MG/1
4 TABLET, ORALLY DISINTEGRATING ORAL EVERY 6 HOURS PRN
Qty: 15 TABLET | Refills: 0 | Status: SHIPPED | OUTPATIENT
Start: 2019-02-25 | End: 2019-05-16

## 2019-02-25 NOTE — PROGRESS NOTES
"Subjective:       Patient ID: Kitty Jennings is a 43 y.o. female.    Chief Complaint: Leg Swelling and Headache    This is a 43 year old female who presents to the clinic with multiple complaints. She was recently switched from olmesartan-HCTZ to separate olmesartan and HCTZ prescriptions due to a prescription recall. She did not  her HCTZ due to a misunderstanding and she secondarily experienced bilateral leg swelling and a headache over the weekend. She did take an old combination pill yesterday and has experienced some improvement in the leg swelling since then with concurrent use of compression stockings as well.     She additionally is complaining of nausea and vomiting x1 that began today. State she is subsequently drinking less water due to the nausea and reports associated heart "fluttering" today with a lower than usual heart rate of 45 bpm.      Review of Systems   Constitutional: Negative for activity change and appetite change.   HENT: Negative for nosebleeds.    Eyes: Negative for pain and visual disturbance.   Respiratory: Negative for chest tightness and shortness of breath.    Cardiovascular: Positive for palpitations and leg swelling. Negative for chest pain.   Gastrointestinal: Positive for nausea and vomiting.   Genitourinary: Negative for difficulty urinating, dysuria and frequency.   Musculoskeletal: Negative for arthralgias, back pain, gait problem, joint swelling and neck pain.   Neurological: Positive for headaches. Negative for dizziness, tremors, weakness, light-headedness and numbness.       Objective:      Physical Exam   Constitutional: She is oriented to person, place, and time. She appears well-developed and well-nourished. She appears distressed.   HENT:   Head: Normocephalic and atraumatic.   Eyes: Conjunctivae are normal.   Cardiovascular: Regular rhythm and normal heart sounds. Bradycardia present.   Pulmonary/Chest: Effort normal.   Musculoskeletal:   Bilateral lower " extremity edema extending from the knee and over the feet. 1+ pitting over the pretibial region bilaterally. Not pitting over the feet.   Neurological: She is alert and oriented to person, place, and time.   Skin: Skin is warm and dry. She is not diaphoretic.   Psychiatric: She has a normal mood and affect. Her behavior is normal. Judgment and thought content normal.       Assessment:       1. Nausea    2. Palpitations    3. Abnormal EKG    4. Viral gastroenteritis        Plan:       Kitty was seen today for leg swelling and headache.    Diagnoses and all orders for this visit:    Nausea  -     ondansetron (ZOFRAN-ODT) 4 MG TbDL; Take 1 tablet (4 mg total) by mouth every 6 (six) hours as needed.    Palpitations  -     EKG 12-lead  -     CBC auto differential; Future  -     Comprehensive metabolic panel; Future  -     Magnesium; Future  -     TSH; Future  -     Ambulatory referral to Cardiology    Abnormal EKG  -     Ambulatory referral to Cardiology    Viral gastroenteritis       -      Zofran, push fluids.       Stop propanolol for now due to decreased heart rate. Stop HCTZ secondary to dehydration at this time. Elevate legs above the heart and continue wearing compression stockings. Take zofran for nausea and push fluids. Please go directly to the ED if you experience any chest pain, shortness of breath or changes in symptoms.

## 2019-02-25 NOTE — PROGRESS NOTES
Pre-Visit Chart Review  For Appointment Scheduled on 02/25/2019    Health Maintenance Due   Topic Date Due    Lipid Panel  1975    Pap Smear with HPV Cotest  06/15/1996    Influenza Vaccine  08/01/2018

## 2019-02-26 ENCOUNTER — OFFICE VISIT (OUTPATIENT)
Dept: CARDIOLOGY | Facility: CLINIC | Age: 44
End: 2019-02-26
Payer: COMMERCIAL

## 2019-02-26 VITALS
WEIGHT: 257.5 LBS | SYSTOLIC BLOOD PRESSURE: 134 MMHG | BODY MASS INDEX: 45.62 KG/M2 | DIASTOLIC BLOOD PRESSURE: 90 MMHG | HEIGHT: 63 IN | HEART RATE: 54 BPM

## 2019-02-26 DIAGNOSIS — I10 ESSENTIAL HYPERTENSION: ICD-10-CM

## 2019-02-26 DIAGNOSIS — Z86.69 HX OF MIGRAINES: ICD-10-CM

## 2019-02-26 DIAGNOSIS — R00.2 PALPITATIONS: ICD-10-CM

## 2019-02-26 PROCEDURE — 3075F SYST BP GE 130 - 139MM HG: CPT | Mod: CPTII,S$GLB,, | Performed by: INTERNAL MEDICINE

## 2019-02-26 PROCEDURE — 3075F PR MOST RECENT SYSTOLIC BLOOD PRESS GE 130-139MM HG: ICD-10-PCS | Mod: CPTII,S$GLB,, | Performed by: INTERNAL MEDICINE

## 2019-02-26 PROCEDURE — 3080F DIAST BP >= 90 MM HG: CPT | Mod: CPTII,S$GLB,, | Performed by: INTERNAL MEDICINE

## 2019-02-26 PROCEDURE — 3008F BODY MASS INDEX DOCD: CPT | Mod: CPTII,S$GLB,, | Performed by: INTERNAL MEDICINE

## 2019-02-26 PROCEDURE — 99999 PR PBB SHADOW E&M-EST. PATIENT-LVL III: CPT | Mod: PBBFAC,,, | Performed by: INTERNAL MEDICINE

## 2019-02-26 PROCEDURE — 3080F PR MOST RECENT DIASTOLIC BLOOD PRESSURE >= 90 MM HG: ICD-10-PCS | Mod: CPTII,S$GLB,, | Performed by: INTERNAL MEDICINE

## 2019-02-26 PROCEDURE — 99204 OFFICE O/P NEW MOD 45 MIN: CPT | Mod: S$GLB,,, | Performed by: INTERNAL MEDICINE

## 2019-02-26 PROCEDURE — 3008F PR BODY MASS INDEX (BMI) DOCUMENTED: ICD-10-PCS | Mod: CPTII,S$GLB,, | Performed by: INTERNAL MEDICINE

## 2019-02-26 PROCEDURE — 99999 PR PBB SHADOW E&M-EST. PATIENT-LVL III: ICD-10-PCS | Mod: PBBFAC,,, | Performed by: INTERNAL MEDICINE

## 2019-02-26 PROCEDURE — 99204 PR OFFICE/OUTPT VISIT, NEW, LEVL IV, 45-59 MIN: ICD-10-PCS | Mod: S$GLB,,, | Performed by: INTERNAL MEDICINE

## 2019-02-26 NOTE — PROGRESS NOTES
Subjective:    Patient ID:  Kitty Jennings is a 43 y.o. female who presents for evaluation of Palpitations and Edema      Pt has had some medicine changes recently and apparently a few miscues. Her olmesartan-HCT was stopped so as to separate the diuretic out and as a result she had not been on the HCTZ component. She started to swell. She has also been having an increase in her palpitations recently mostly at night while in bed. She denies rapid or sustained rhythms. She denies any chest pain, SOB, PND, orthopnea.        Review of Systems   Constitution: Negative for weight gain and weight loss.   HENT: Negative.    Eyes: Negative.    Cardiovascular: Positive for irregular heartbeat, leg swelling and palpitations. Negative for chest pain, claudication, cyanosis, dyspnea on exertion, near-syncope and orthopnea (no PND).   Respiratory: Negative for cough, hemoptysis, shortness of breath and snoring.    Endocrine: Negative.    Skin: Negative.    Musculoskeletal: Negative for joint pain, muscle cramps, muscle weakness and myalgias.   Gastrointestinal: Negative for diarrhea, hematemesis, nausea and vomiting.   Genitourinary: Negative.    Neurological: Negative for dizziness, focal weakness, light-headedness, loss of balance, numbness, paresthesias and seizures.   Psychiatric/Behavioral: Negative.         Objective:    Physical Exam   Constitutional: She is oriented to person, place, and time. She appears well-developed and well-nourished.   Eyes: Pupils are equal, round, and reactive to light.   Neck: Normal range of motion. No thyromegaly present.   Cardiovascular: Normal rate, regular rhythm, S1 normal, S2 normal, normal heart sounds, intact distal pulses and normal pulses.  No extrasystoles are present. PMI is not displaced. Exam reveals no friction rub.   No murmur heard.  Pulmonary/Chest: Effort normal and breath sounds normal. She has no wheezes. She has no rales. She exhibits no tenderness.   Abdominal: Soft.  Bowel sounds are normal. She exhibits no distension and no mass. There is no tenderness.   Musculoskeletal: Normal range of motion. She exhibits edema (1-2+).   Neurological: She is alert and oriented to person, place, and time.   Skin: Skin is warm and dry.   Vitals reviewed.      Test(s) Reviewed  I have reviewed the following in detail:  [] Stress test   [] Angiography   [] Echocardiogram   [x] Labs   [x] Other:  ECG       Assessment:       1. Essential hypertension    2. Palpitations    3. Hx of migraines         Plan:       We discussed her symptoms and that most likely palpitations are premature beats - PAC/PVC's  Spent 20 min discussing benign nature of the palpitations and PVC's, as well as the causes of increased frequency including stress , caffine, etc.  Pt was reassured.  We discussed possibly changing her B-blocker but she is a little reluctant as she has not had migraine issues since being on it.  I am ok with her continuing as she is not symptomatic with the bradycardia  We discussed the need to restart the HCTZ  We discussed reducing sodium in the diet  We discussed monitoring her BP at home  Will get Holter  Echo

## 2019-02-26 NOTE — LETTER
February 26, 2019      ROMAN Villa  [address]  [place] [address]           [facility]  [address] [facility] [address] [place] [address]  Phone: [phone]          Patient: Kitty Jennings   MR Number: [id number]   YOB: 1975   Date of Visit: 2/26/2019       Dear Olga Holland:    Thank you for referring Kitty Jennings to me for evaluation. Attached you will find relevant portions of my assessment and plan of care.    If you have questions, please do not hesitate to call me. I look forward to following Kitty Jennings along with you.    Sincerely,    Cirilo Fabian MD    Enclosure  CC:  No Recipients    If you would like to receive this communication electronically, please contact [email or web address] or [phone] to request more information on [employer] Link access.    For providers and/or their staff who would like to refer a patient to [facility], please contact us through our one-stop-shop provider referral line, [facility], at [phone].    If you feel you have received this communication in error or would no longer like to receive these types of communications, please e-mail [email or web address]

## 2019-03-02 ENCOUNTER — PATIENT MESSAGE (OUTPATIENT)
Dept: FAMILY MEDICINE | Facility: CLINIC | Age: 44
End: 2019-03-02

## 2019-03-02 DIAGNOSIS — K21.9 GASTROESOPHAGEAL REFLUX DISEASE, ESOPHAGITIS PRESENCE NOT SPECIFIED: ICD-10-CM

## 2019-03-02 DIAGNOSIS — I10 GOOD HYPERTENSION CONTROL: Primary | ICD-10-CM

## 2019-03-02 DIAGNOSIS — R11.0 NAUSEA: ICD-10-CM

## 2019-03-04 ENCOUNTER — CLINICAL SUPPORT (OUTPATIENT)
Dept: CARDIOLOGY | Facility: CLINIC | Age: 44
End: 2019-03-04
Attending: INTERNAL MEDICINE
Payer: COMMERCIAL

## 2019-03-04 DIAGNOSIS — I10 ESSENTIAL HYPERTENSION: ICD-10-CM

## 2019-03-04 DIAGNOSIS — R00.2 PALPITATIONS: ICD-10-CM

## 2019-03-04 PROCEDURE — 93224 HOLTER MONITOR - 48 HOUR (CUPID ONLY): ICD-10-PCS | Mod: S$GLB,,, | Performed by: INTERNAL MEDICINE

## 2019-03-04 PROCEDURE — 99999 PR PBB SHADOW E&M-EST. PATIENT-LVL I: ICD-10-PCS | Mod: PBBFAC,,,

## 2019-03-04 PROCEDURE — 99999 PR PBB SHADOW E&M-EST. PATIENT-LVL I: CPT | Mod: PBBFAC,,,

## 2019-03-04 PROCEDURE — 93224 XTRNL ECG REC UP TO 48 HRS: CPT | Mod: S$GLB,,, | Performed by: INTERNAL MEDICINE

## 2019-03-04 RX ORDER — PROPRANOLOL HYDROCHLORIDE 160 MG/1
CAPSULE, EXTENDED RELEASE ORAL
Qty: 90 CAPSULE | Refills: 3 | Status: SHIPPED | OUTPATIENT
Start: 2019-03-04 | End: 2020-10-02 | Stop reason: SDUPTHER

## 2019-03-04 RX ORDER — ESOMEPRAZOLE MAGNESIUM 40 MG/1
CAPSULE, DELAYED RELEASE ORAL
Qty: 90 CAPSULE | Refills: 3 | OUTPATIENT
Start: 2019-03-04 | End: 2020-10-02 | Stop reason: SDUPTHER

## 2019-03-06 ENCOUNTER — CLINICAL SUPPORT (OUTPATIENT)
Dept: CARDIOLOGY | Facility: CLINIC | Age: 44
End: 2019-03-06
Attending: INTERNAL MEDICINE
Payer: COMMERCIAL

## 2019-03-06 VITALS
SYSTOLIC BLOOD PRESSURE: 130 MMHG | WEIGHT: 257 LBS | HEIGHT: 63 IN | DIASTOLIC BLOOD PRESSURE: 70 MMHG | BODY MASS INDEX: 45.54 KG/M2 | HEART RATE: 55 BPM

## 2019-03-06 DIAGNOSIS — R00.2 PALPITATIONS: ICD-10-CM

## 2019-03-06 DIAGNOSIS — I10 ESSENTIAL HYPERTENSION: ICD-10-CM

## 2019-03-06 PROCEDURE — 93306 TRANSTHORACIC ECHO (TTE) COMPLETE (CUPID ONLY): ICD-10-PCS | Mod: S$GLB,,, | Performed by: INTERNAL MEDICINE

## 2019-03-06 PROCEDURE — 93306 TTE W/DOPPLER COMPLETE: CPT | Mod: S$GLB,,, | Performed by: INTERNAL MEDICINE

## 2019-03-06 PROCEDURE — 99999 PR PBB SHADOW E&M-EST. PATIENT-LVL II: CPT | Mod: PBBFAC,,,

## 2019-03-06 PROCEDURE — 99999 PR PBB SHADOW E&M-EST. PATIENT-LVL II: ICD-10-PCS | Mod: PBBFAC,,,

## 2019-03-07 LAB
ASCENDING AORTA: 2.59 CM
AV INDEX (PROSTH): 0.71
AV MEAN GRADIENT: 5.16 MMHG
AV PEAK GRADIENT: 9.36 MMHG
AV VALVE AREA: 2.12 CM2
AV VELOCITY RATIO: 0.67
BSA FOR ECHO PROCEDURE: 2.28 M2
CV ECHO LV RWT: 0.32 CM
DOP CALC AO PEAK VEL: 1.53 M/S
DOP CALC AO VTI: 33.57 CM
DOP CALC LVOT AREA: 2.98 CM2
DOP CALC LVOT DIAMETER: 1.95 CM
DOP CALC LVOT PEAK VEL: 1.03 M/S
DOP CALC LVOT STROKE VOLUME: 71.13 CM3
DOP CALCLVOT PEAK VEL VTI: 23.83 CM
E WAVE DECELERATION TIME: 187.17 MSEC
E/A RATIO: 1.05
E/E' RATIO: 5.65
ECHO LV POSTERIOR WALL: 0.75 CM (ref 0.6–1.1)
FRACTIONAL SHORTENING: 32 % (ref 28–44)
INTERVENTRICULAR SEPTUM: 0.77 CM (ref 0.6–1.1)
LA MAJOR: 5.67 CM
LA MINOR: 5.07 CM
LA WIDTH: 3.78 CM
LEFT ATRIUM SIZE: 4.05 CM
LEFT ATRIUM VOLUME INDEX: 32.4 ML/M2
LEFT ATRIUM VOLUME: 69.66 CM3
LEFT INTERNAL DIMENSION IN SYSTOLE: 3.17 CM (ref 2.1–4)
LEFT VENTRICLE DIASTOLIC VOLUME INDEX: 46.19 ML/M2
LEFT VENTRICLE DIASTOLIC VOLUME: 99.37 ML
LEFT VENTRICLE MASS INDEX: 52 G/M2
LEFT VENTRICLE SYSTOLIC VOLUME INDEX: 18.6 ML/M2
LEFT VENTRICLE SYSTOLIC VOLUME: 39.94 ML
LEFT VENTRICULAR INTERNAL DIMENSION IN DIASTOLE: 4.64 CM (ref 3.5–6)
LEFT VENTRICULAR MASS: 111.96 G
LV LATERAL E/E' RATIO: 5
LV SEPTAL E/E' RATIO: 6.5
MV PEAK A VEL: 0.62 M/S
MV PEAK E VEL: 0.65 M/S
PISA TR MAX VEL: 2.28 M/S
RA MAJOR: 4.97 CM
RA PRESSURE: 3 MMHG
RA WIDTH: 3.26 CM
RIGHT VENTRICULAR END-DIASTOLIC DIMENSION: 4.23 CM
SINUS: 2.78 CM
STJ: 2.48 CM
TDI LATERAL: 0.13
TDI SEPTAL: 0.1
TDI: 0.12
TR MAX PG: 20.79 MMHG
TRICUSPID ANNULAR PLANE SYSTOLIC EXCURSION: 2.17 CM
TV REST PULMONARY ARTERY PRESSURE: 24 MMHG

## 2019-03-08 ENCOUNTER — TELEPHONE (OUTPATIENT)
Dept: CARDIOLOGY | Facility: CLINIC | Age: 44
End: 2019-03-08

## 2019-03-08 LAB
OHS CV EVENT MONITOR DAY: 0
OHS CV HOLTER LENGTH DECIMAL HOURS: 48
OHS CV HOLTER LENGTH HOURS: 48
OHS CV HOLTER LENGTH MINUTES: 0

## 2019-03-08 NOTE — TELEPHONE ENCOUNTER
Test(s) Reviewed  I have reviewed the following in detail:  [] Stress test   [] Angiography   [x] Echocardiogram   [] Labs   [x] Other:  Holter     Call Pt and tell her tests are all normal

## 2019-03-11 ENCOUNTER — TELEPHONE (OUTPATIENT)
Dept: FAMILY MEDICINE | Facility: CLINIC | Age: 44
End: 2019-03-11

## 2019-03-11 NOTE — TELEPHONE ENCOUNTER
----- Message from Ly Reynolds sent at 3/11/2019  2:00 PM CDT -----  Contact: self [phone]  She said the pharmacy did not receive the prescription for esomeprazole (NEXIUM) 40 MG capsule.  Please follow up on it.  Thank you!

## 2019-03-11 NOTE — TELEPHONE ENCOUNTER
Called [facility] and they stated that they did not get a prescription for the patient's nexium 40 mg.  Called in nexium 40 mg #[phone] with 3 refills to [facility] today.  Patient notified by phone and verbalized understanding.

## 2019-04-17 ENCOUNTER — PATIENT MESSAGE (OUTPATIENT)
Dept: ALLERGY | Facility: CLINIC | Age: 44
End: 2019-04-17

## 2019-05-16 ENCOUNTER — OFFICE VISIT (OUTPATIENT)
Dept: HEMATOLOGY/ONCOLOGY | Facility: CLINIC | Age: 44
End: 2019-05-16
Payer: COMMERCIAL

## 2019-05-16 VITALS
WEIGHT: 245 LBS | HEART RATE: 56 BPM | DIASTOLIC BLOOD PRESSURE: 71 MMHG | RESPIRATION RATE: 20 BRPM | SYSTOLIC BLOOD PRESSURE: 108 MMHG | BODY MASS INDEX: 43.4 KG/M2 | TEMPERATURE: 98 F

## 2019-05-16 DIAGNOSIS — D50.8 IRON DEFICIENCY ANEMIA FOLLOWING BARIATRIC SURGERY: Primary | ICD-10-CM

## 2019-05-16 DIAGNOSIS — E53.1 PYRIDOXINE DEFICIENCY: ICD-10-CM

## 2019-05-16 DIAGNOSIS — K95.89 IRON DEFICIENCY ANEMIA FOLLOWING BARIATRIC SURGERY: Primary | ICD-10-CM

## 2019-05-16 PROCEDURE — 3074F SYST BP LT 130 MM HG: CPT | Mod: ,,, | Performed by: INTERNAL MEDICINE

## 2019-05-16 PROCEDURE — 3078F DIAST BP <80 MM HG: CPT | Mod: ,,, | Performed by: INTERNAL MEDICINE

## 2019-05-16 PROCEDURE — 99214 OFFICE O/P EST MOD 30 MIN: CPT | Mod: ,,, | Performed by: INTERNAL MEDICINE

## 2019-05-16 PROCEDURE — 3008F BODY MASS INDEX DOCD: CPT | Mod: ,,, | Performed by: INTERNAL MEDICINE

## 2019-05-16 PROCEDURE — 3008F PR BODY MASS INDEX (BMI) DOCUMENTED: ICD-10-PCS | Mod: ,,, | Performed by: INTERNAL MEDICINE

## 2019-05-16 PROCEDURE — 99214 PR OFFICE/OUTPT VISIT, EST, LEVL IV, 30-39 MIN: ICD-10-PCS | Mod: ,,, | Performed by: INTERNAL MEDICINE

## 2019-05-16 PROCEDURE — 3078F PR MOST RECENT DIASTOLIC BLOOD PRESSURE < 80 MM HG: ICD-10-PCS | Mod: ,,, | Performed by: INTERNAL MEDICINE

## 2019-05-16 PROCEDURE — 3074F PR MOST RECENT SYSTOLIC BLOOD PRESSURE < 130 MM HG: ICD-10-PCS | Mod: ,,, | Performed by: INTERNAL MEDICINE

## 2019-05-19 NOTE — PROGRESS NOTES
[facility] History & Physical    Subjective:      Patient ID:   Kitty Jennings  43 y.o. female  1975  Ana See NP      Chief Complaint:   Anemia eval.    HPI:  43 y.o. female with hx of Fe deficiency anemia, treated with oral Fe in the past.  Admits to fatigue decreasing.  Energy /10.  More time out of bed after a days work.  Intermittent hives since age 19.  Zertec and benadryl prn.  Dr. Chavez feels hives may be stress induced.  She does not get hives on the weekend, away from her job duties.    She completed Injectafer x's 3 months ago.  Stronger.  Hive sx have improved.  She saw Dr. Chavez.  Measures taken to control hives sx.     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  Smoke no.  ETOH no. Allergy no.    Hx HTN, GERD, migraine HA.  Menses NL flow.    Gastric sleave surgery 13.    M0  Menses onset 11  1st live child at 22    No family hx of anemia.  Dad HTN, DM  Mom A & W  Sister HTN x's 2    ROS:   GEN: normal without any fever, night sweats or weight loss  HEENT: normal with no HA's, sore throat, stiff neck, changes in vision  CV: normal with no CP, SOB, PND, KAM or orthopnea  PULM: normal with no SOB, cough, hemoptysis, sputum or pleuritic pain  GI: normal with no abdominal pain, nausea, vomiting, constipation, diarrhea, melanotic stools, BRBPR, or hematemesis  : normal with no hematuria, dysuria  BREAST: normal with no mass, discharge, pain  SKIN: see HPI      Review of patient's allergies indicates:  No Known Allergies  Social History     Socioeconomic History    Marital status: Single     Spouse name: Not on file    Number of children: Not on file    Years of education: Not on file    Highest education level: Not on file   Occupational History    Not on file   Social Needs    Financial resource strain: Not hard at all    Food insecurity:     Worry: Never true     Inability: Never true    Transportation needs:     Medical: No     Non-medical: No   Tobacco Use    Smoking  status: Never Smoker    Smokeless tobacco: Never Used   Substance and Sexual Activity    Alcohol use: No     Frequency: Never    Drug use: No    Sexual activity: Yes   Lifestyle    Physical activity:     Days per week: 0 days     Minutes per session: 0 min    Stress: To some extent   Relationships    Social connections:     Talks on phone: Three times a week     Gets together: Three times a week     Attends [employer] service: 1 to 4 times per year     Active member of club or organization: Yes     Attends meetings of clubs or organizations: More than 4 times per year     Relationship status:    Other Topics Concern    Not on file   Social History Narrative    Not on file         Current Outpatient Medications:     cetirizine (ZYRTEC) 10 mg Cap, Zyrtec 10 mg capsule  Take by oral route., Disp: , Rfl:     esomeprazole (NEXIUM) 40 MG capsule, Nexium 40 mg capsule,delayed release  TAKE ONE CAPSULE BY MOUTH DAILY, Disp: 90 capsule, Rfl: 3    olmesartan (BENICAR) 40 MG tablet, Take 1 tablet (40 mg total) by mouth once daily., Disp: 90 tablet, Rfl: 3    propranolol (INDERAL [place]) 160 mg 24 hr capsule, propranolol  mg capsule,24 hr,extended release, Disp: 90 capsule, Rfl: 3          Objective:   Vitals:  Blood pressure 108/71, pulse (!) 56, temperature 97.8 °F (36.6 °C), resp. rate 20, weight 111.1 kg (245 lb).    She did not aggree to physical exam.    Hgb8.7. Ferritin 10.  Repeat CBC, Ferritin pendings.  Assessment:   (1) 43 y.o. female with diagnosis of Fe deficiency 2nd decreased absorption 2nd bariatric surgery.  Gave  Injectafer  Weekly x's 2 to replenish Fe stores.  Estimated 1-2% risk of reaction, she tolerated it well.  [facility].  Check lab and RTC 3 months.    (2)Referred to Dr. Lori Chavez of allergy/ immunology to try clarify hives and Rx.  She will follow up with MD.    RTC 4 months with CBC, Ferritin.    On B 6 po, PN sx in feet resolved.        1. Iron deficiency anemia  following bariatric surgery    2. Pyridoxine deficiency        Plan:       Iron deficiency anemia following bariatric surgery  -     CBC auto differential; Standing  -     Ferritin; Standing  -     Vitamin B6; Standing    Pyridoxine deficiency  -     CBC auto differential; Standing  -     Ferritin; Standing  -     Vitamin B6; Standing      Follow up in about 4 months (around 9/12/2019) for check of blood status after therapy.

## 2019-06-23 DIAGNOSIS — L50.8 CHRONIC URTICARIA: ICD-10-CM

## 2019-06-24 RX ORDER — CETIRIZINE HYDROCHLORIDE 10 MG/1
TABLET ORAL
Qty: 60 TABLET | Refills: 0 | Status: SHIPPED | OUTPATIENT
Start: 2019-06-24 | End: 2019-07-23 | Stop reason: SDUPTHER

## 2019-07-12 ENCOUNTER — HOSPITAL ENCOUNTER (OUTPATIENT)
Dept: RADIOLOGY | Facility: HOSPITAL | Age: 44
Discharge: HOME | End: 2019-07-12
Attending: PHYSICIAN ASSISTANT
Payer: COMMERCIAL

## 2019-07-12 DIAGNOSIS — N63.10 BREAST MASS, RIGHT: ICD-10-CM

## 2019-07-12 PROCEDURE — 77065 MAMMO DIGITAL DIAGNOSTIC RIGHT WITH TOMOSYNTHESIS_CAD: ICD-10-PCS | Mod: 26,,, | Performed by: RADIOLOGY

## 2019-07-12 PROCEDURE — 77065 DX MAMMO INCL CAD UNI: CPT | Mod: 26,,, | Performed by: RADIOLOGY

## 2019-07-12 PROCEDURE — 77061 BREAST TOMOSYNTHESIS UNI: CPT | Mod: 26,,, | Performed by: RADIOLOGY

## 2019-07-12 PROCEDURE — 77061 BREAST TOMOSYNTHESIS UNI: CPT | Mod: TC

## 2019-07-12 PROCEDURE — 77061 MAMMO DIGITAL DIAGNOSTIC RIGHT WITH TOMOSYNTHESIS_CAD: ICD-10-PCS | Mod: 26,,, | Performed by: RADIOLOGY

## 2019-07-12 PROCEDURE — 76642 ULTRASOUND BREAST LIMITED: CPT | Mod: TC,RT

## 2019-07-12 PROCEDURE — 76642 US BREAST RIGHT LIMITED: ICD-10-PCS | Mod: 26,RT,, | Performed by: RADIOLOGY

## 2019-07-12 PROCEDURE — 76642 ULTRASOUND BREAST LIMITED: CPT | Mod: 26,RT,, | Performed by: RADIOLOGY

## 2019-07-12 PROCEDURE — 77065 DX MAMMO INCL CAD UNI: CPT | Mod: TC

## 2019-07-23 DIAGNOSIS — L50.8 CHRONIC URTICARIA: ICD-10-CM

## 2019-07-23 RX ORDER — CETIRIZINE HYDROCHLORIDE 10 MG/1
TABLET ORAL
Qty: 60 TABLET | Refills: 0 | Status: SHIPPED | OUTPATIENT
Start: 2019-07-23 | End: 2019-10-07 | Stop reason: SDUPTHER

## 2019-08-12 ENCOUNTER — OFFICE VISIT (OUTPATIENT)
Dept: ALLERGY | Facility: CLINIC | Age: 44
End: 2019-08-12
Payer: COMMERCIAL

## 2019-08-12 VITALS
BODY MASS INDEX: 43.59 KG/M2 | SYSTOLIC BLOOD PRESSURE: 114 MMHG | HEIGHT: 63 IN | DIASTOLIC BLOOD PRESSURE: 72 MMHG | WEIGHT: 246 LBS

## 2019-08-12 DIAGNOSIS — T78.3XXD ANGIOEDEMA, SUBSEQUENT ENCOUNTER: Primary | ICD-10-CM

## 2019-08-12 DIAGNOSIS — L50.8 CHRONIC URTICARIA: ICD-10-CM

## 2019-08-12 PROCEDURE — 3074F SYST BP LT 130 MM HG: CPT | Mod: S$GLB,,, | Performed by: ALLERGY & IMMUNOLOGY

## 2019-08-12 PROCEDURE — 3078F PR MOST RECENT DIASTOLIC BLOOD PRESSURE < 80 MM HG: ICD-10-PCS | Mod: S$GLB,,, | Performed by: ALLERGY & IMMUNOLOGY

## 2019-08-12 PROCEDURE — 3074F PR MOST RECENT SYSTOLIC BLOOD PRESSURE < 130 MM HG: ICD-10-PCS | Mod: S$GLB,,, | Performed by: ALLERGY & IMMUNOLOGY

## 2019-08-12 PROCEDURE — 99213 PR OFFICE/OUTPT VISIT, EST, LEVL III, 20-29 MIN: ICD-10-PCS | Mod: S$GLB,,, | Performed by: ALLERGY & IMMUNOLOGY

## 2019-08-12 PROCEDURE — 99213 OFFICE O/P EST LOW 20 MIN: CPT | Mod: S$GLB,,, | Performed by: ALLERGY & IMMUNOLOGY

## 2019-08-12 PROCEDURE — 3008F PR BODY MASS INDEX (BMI) DOCUMENTED: ICD-10-PCS | Mod: S$GLB,,, | Performed by: ALLERGY & IMMUNOLOGY

## 2019-08-12 PROCEDURE — 3008F BODY MASS INDEX DOCD: CPT | Mod: S$GLB,,, | Performed by: ALLERGY & IMMUNOLOGY

## 2019-08-12 PROCEDURE — 3078F DIAST BP <80 MM HG: CPT | Mod: S$GLB,,, | Performed by: ALLERGY & IMMUNOLOGY

## 2019-08-12 RX ORDER — HYDROCHLOROTHIAZIDE 25 MG/1
TABLET ORAL
Refills: 11 | COMMUNITY
Start: 2019-07-27 | End: 2020-08-09

## 2019-08-12 NOTE — PROGRESS NOTES
"Subjective:       Patient ID: Kityt Jennings is a 44 y.o. female.    Chief Complaint: Urticaria ("they are gone" )    HPI     Pt presents for urticaria.     At the last visit, she was started on doxepin, zyrtec bid, and zantac bid.   We are considering xolair if her breast mass is not cancerous. No calcified material. Does not seem to be cancerous at this time.     Condition: improved   Pt states they may have resolved in May.   Started in her teenage years  Associated facial swelling with hives, last week facial swelling.   Pt does mention a temporal association with eating shrimp of lip swelling and generalized urticaria. This was about 2-3 hours post ingestion.   Location generalized  Frequency comes and goes- currently daily   Typically will go away after a few months  Recently started working for [employer] x 8 years.   Most recently, for the past 8 years, more frequent.   She would feel a deep itch.   Tx: 2 zyrtec q am.      She did have one incident and her lips started swelling in July.   Concern that possible non fresh fish made her lip swell.   Had fish since that time without incident.     Saw another allergist: Dr. Waldron 5 years ago.   Prior had allergy testing   Serum and skin test was negative.         Review of Systems      General: neg unexpected weight changes, fevers, chills, night sweats, malaise  HEENT: see hpi, Neg eye pain, vision changes, ear drainage, nose bleeds, throat tightness, sores in the mouth  CV: Neg chest pain, palpitations, swelling  Resp: see hpi, neg shortness of breath, hemoptysis, cough  GI: see hpi, neg dysphagia, night abdominal pain, reflux, chronic diarrhea, chronic constipation  Derm: See Hpi,  neg flushing  Mu/sk: Neg joint pain, joint swelling   Psych: Neg anxiety  neuro: neg chronic headaches, muscle weakness  Endo: neg heat/cold intolerance, chronic fatigue    Objective:     Vitals:    08/12/19 0939   BP: 114/72   Weight: 111.6 kg (246 lb)   Height: 5' 3" (1.6 m)      "   Physical Exam      General: no acute distress, well developed well nourished   HEENT:   Head:normocephalic atraumatic  Eyes: DUSTY, EOMI, Neg injection, scleral icterus, or conjunctival papillary hypertrophy.  Ears: tm clear bilaterally, normal canal  Nose: nares patent   OP: mucus membranes moist, - cobblestoning, - PND, neg erythema or lesions  Neck: supple, Full range of motion, neg lymphadenopathy  Chest: full respiratory excursion no abnormal chest abnormality  Resp: clear to ascultation bilaterally  CV: RRR, neg MRG, brisk capillary refill  Abdomen: BS+, non tender, non distended  Ext:  Neg clubbing, cyanosis, pitting edema  Skin: neg lesions   Lymph: neg supraclavicular, axillary     Assessment:       1. Angioedema, subsequent encounter    2. Chronic urticaria        Plan:       Angioedema, subsequent encounter    Chronic urticaria    explained urticaria and action plan and education.   currently controlled on 20 mg zyrtec q am.   continue high dose antihistamines  Consider montelukast    Discussed tapering zyrtec.     Follow up in 6-12 months.         Lori Chavez M.D.  Allergy/Immunology  [facility]   [phone] phone  [phone] fax

## 2019-08-12 NOTE — PATIENT INSTRUCTIONS
You are doing great ! :o)      Be brave if you can, and see if you can taper the zyrtec.     Let's count 6 weeks from lip swelling and see if you can go without lip swelling and hives and then go to 1 zyrtec once per day.     If after 2 weeks no hives, swelling, or itch, then see if you can stop it.     Let me know if hives occur again.

## 2019-09-19 ENCOUNTER — OFFICE VISIT (OUTPATIENT)
Dept: HEMATOLOGY/ONCOLOGY | Facility: CLINIC | Age: 44
End: 2019-09-19
Payer: COMMERCIAL

## 2019-09-19 VITALS
BODY MASS INDEX: 43.58 KG/M2 | TEMPERATURE: 98 F | DIASTOLIC BLOOD PRESSURE: 70 MMHG | OXYGEN SATURATION: 98 % | SYSTOLIC BLOOD PRESSURE: 99 MMHG | HEART RATE: 74 BPM | WEIGHT: 246 LBS

## 2019-09-19 DIAGNOSIS — N63.0 BREAST MASS IN FEMALE: ICD-10-CM

## 2019-09-19 DIAGNOSIS — D50.8 IRON DEFICIENCY ANEMIA FOLLOWING BARIATRIC SURGERY: Primary | ICD-10-CM

## 2019-09-19 DIAGNOSIS — R92.8 ABNORMAL MAMMOGRAM OF BOTH BREASTS: ICD-10-CM

## 2019-09-19 DIAGNOSIS — K95.89 IRON DEFICIENCY ANEMIA FOLLOWING BARIATRIC SURGERY: Primary | ICD-10-CM

## 2019-09-19 PROCEDURE — 99214 PR OFFICE/OUTPT VISIT, EST, LEVL IV, 30-39 MIN: ICD-10-PCS | Mod: S$GLB,,, | Performed by: INTERNAL MEDICINE

## 2019-09-19 PROCEDURE — 3008F PR BODY MASS INDEX (BMI) DOCUMENTED: ICD-10-PCS | Mod: S$GLB,,, | Performed by: INTERNAL MEDICINE

## 2019-09-19 PROCEDURE — 3008F BODY MASS INDEX DOCD: CPT | Mod: S$GLB,,, | Performed by: INTERNAL MEDICINE

## 2019-09-19 PROCEDURE — 3078F PR MOST RECENT DIASTOLIC BLOOD PRESSURE < 80 MM HG: ICD-10-PCS | Mod: S$GLB,,, | Performed by: INTERNAL MEDICINE

## 2019-09-19 PROCEDURE — 3074F PR MOST RECENT SYSTOLIC BLOOD PRESSURE < 130 MM HG: ICD-10-PCS | Mod: S$GLB,,, | Performed by: INTERNAL MEDICINE

## 2019-09-19 PROCEDURE — 99214 OFFICE O/P EST MOD 30 MIN: CPT | Mod: S$GLB,,, | Performed by: INTERNAL MEDICINE

## 2019-09-19 PROCEDURE — 3074F SYST BP LT 130 MM HG: CPT | Mod: S$GLB,,, | Performed by: INTERNAL MEDICINE

## 2019-09-19 PROCEDURE — 3078F DIAST BP <80 MM HG: CPT | Mod: S$GLB,,, | Performed by: INTERNAL MEDICINE

## 2019-09-20 NOTE — PROGRESS NOTES
[facility] History & Physical    Subjective:      Patient ID:   Kitty Jennings  44 y.o. female  1975  Ana See NP      Chief Complaint:   Anemia eval.    HPI:  44 y.o. female with hx of Fe deficiency anemia, treated with oral Fe in the past.  Admits to fatigue decreasing.  Energy 7/10.  More time out of bed after a days work.  Intermittent hives since age 19.  Zertec and benadryl prn.  Dr. Chavez feels hives may be stress induced.  She does not get hives on the weekend, away from her job duties.    Recent lab, Hgb 12.5. And ferritin now 225.  Observe for now.    She completed Injectafer x's 3 months ago.  Stronger.  Hive sx have improved.  She saw Dr. Chavez.  Measures taken to control hives sx.  Hives sx controlled with Zertec 20 mg daily.     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 Allergy no.    Hx HTN, GERD, migraine HA.  Menses NL flow.    Gastric sleave surgery 13.    M0  Menses onset 11  1st live child at 22    No family hx of anemia.  Dad HTN, DM  Mom A & W  Sister HTN x's 2    ROS:   GEN: normal without any fever, night sweats or weight loss  HEENT: normal with no HA's, sore throat, stiff neck, changes in vision  CV: normal with no CP, SOB, PND, KAM or orthopnea  PULM: normal with no SOB, cough, hemoptysis, sputum or pleuritic pain  GI: normal with no abdominal pain, nausea, vomiting, constipation, diarrhea, melanotic stools, BRBPR, or hematemesis  : normal with no hematuria, dysuria  BREAST: normal with no mass, discharge, pain  SKIN: see HPI      Review of patient's allergies indicates:  No Known Allergies        Current Outpatient Medications:     cetirizine (ZYRTEC) 10 MG tablet, TAKE 1 TABLET(10 MG) BY MOUTH TWICE DAILY, Disp: 60 tablet, Rfl: 0    esomeprazole (NEXIUM) 40 MG capsule, Nexium 40 mg capsule,delayed release  TAKE ONE CAPSULE BY MOUTH DAILY, Disp: 90 capsule, Rfl: 3    hydroCHLOROthiazide (HYDRODIURIL) 25 MG tablet, , Disp: , Rfl: 11     olmesartan (BENICAR) 40 MG tablet, Take 1 tablet (40 mg total) by mouth once daily., Disp: 90 tablet, Rfl: 3    propranolol (INDERAL LA) 160 mg 24 hr capsule, propranolol  mg capsule,24 hr,extended release, Disp: 90 capsule, Rfl: 3          Objective:   Vitals:  Blood pressure 99/70, pulse 74, temperature 97.7 °F (36.5 °C), weight 111.6 kg (246 lb), SpO2 98 %.    She did not aggree to physical exam.    Hgb 12.5, ferritin 225.  Assessment:   (1) 44 y.o. female with diagnosis of Fe deficiency 2nd decreased absorption 2nd bariatric surgery.  Gave  Injectafer  Weekly x's 2 to replenish Fe stores.  Estimated 1-2% risk of reaction, she tolerated it well.  .  Anemia resolved to NL, with Fe replenishment.    (2)Referred to Dr. Lori Chavez of allergy/ immunology to try clarify hives and Rx. Hives controlled with Zertec 20 mg daily.    On B 6 po, PN sx in feet resolved.    RTC 6 months with CBC, Ferritin and mammogram.        1. Iron deficiency anemia following bariatric surgery    2. Breast mass in female    3. Abnormal mammogram of both breasts        Plan:       Iron deficiency anemia following bariatric surgery  -     Ferritin; Future; Expected date: 12/27/2019  -     CBC auto differential; Future; Expected date: 12/27/2019    Breast mass in female  -     Cancel: Mammo Digital Diagnostic Bilat w/ Van; Future; Expected date: 12/27/2019  -     US Breast Bilateral Complete; Future; Expected date: 12/27/2019  -     Mammo Digital Diagnostic Bilat w/ Van; Future; Expected date: 12/27/2019    Abnormal mammogram of both breasts  -     Cancel: Mammo Digital Diagnostic Bilat w/ Van; Future; Expected date: 12/27/2019  -     US Breast Bilateral Complete; Future; Expected date: 12/27/2019  -     Mammo Digital Diagnostic Bilat w/ Van; Future; Expected date: 12/27/2019      Follow up in about 16 weeks (around 1/9/2020) for check of blood status after therapy, resolution of new problem check.

## 2019-10-07 DIAGNOSIS — L50.8 CHRONIC URTICARIA: ICD-10-CM

## 2019-10-07 RX ORDER — CETIRIZINE HYDROCHLORIDE 10 MG/1
10 TABLET ORAL 2 TIMES DAILY
Qty: 180 TABLET | Refills: 3 | Status: SHIPPED | OUTPATIENT
Start: 2019-10-07 | End: 2020-09-11 | Stop reason: SDUPTHER

## 2019-12-30 ENCOUNTER — HOSPITAL ENCOUNTER (OUTPATIENT)
Dept: RADIOLOGY | Facility: HOSPITAL | Age: 44
Discharge: HOME | End: 2019-12-30
Attending: INTERNAL MEDICINE
Payer: COMMERCIAL

## 2019-12-30 VITALS — BODY MASS INDEX: 43.6 KG/M2 | HEIGHT: 63 IN | WEIGHT: 246.06 LBS

## 2019-12-30 DIAGNOSIS — N63.0 BREAST MASS IN FEMALE: ICD-10-CM

## 2019-12-30 DIAGNOSIS — R92.8 ABNORMAL MAMMOGRAM OF BOTH BREASTS: ICD-10-CM

## 2019-12-30 PROCEDURE — 77066 DX MAMMO INCL CAD BI: CPT | Mod: TC,PO

## 2019-12-30 PROCEDURE — 76642 ULTRASOUND BREAST LIMITED: CPT | Mod: TC,PO,RT

## 2020-01-02 ENCOUNTER — OFFICE VISIT (OUTPATIENT)
Dept: HEMATOLOGY/ONCOLOGY | Facility: CLINIC | Age: 45
End: 2020-01-02
Payer: COMMERCIAL

## 2020-01-02 VITALS
RESPIRATION RATE: 19 BRPM | WEIGHT: 257.88 LBS | SYSTOLIC BLOOD PRESSURE: 114 MMHG | DIASTOLIC BLOOD PRESSURE: 70 MMHG | HEART RATE: 58 BPM | TEMPERATURE: 99 F | BODY MASS INDEX: 45.68 KG/M2

## 2020-01-02 DIAGNOSIS — R92.8 ABNORMAL MAMMOGRAM OF RIGHT BREAST: ICD-10-CM

## 2020-01-02 DIAGNOSIS — K95.89 IRON DEFICIENCY ANEMIA FOLLOWING BARIATRIC SURGERY: Primary | ICD-10-CM

## 2020-01-02 DIAGNOSIS — D50.8 IRON DEFICIENCY ANEMIA FOLLOWING BARIATRIC SURGERY: Primary | ICD-10-CM

## 2020-01-02 PROCEDURE — 3078F DIAST BP <80 MM HG: CPT | Mod: S$GLB,,, | Performed by: INTERNAL MEDICINE

## 2020-01-02 PROCEDURE — 3074F PR MOST RECENT SYSTOLIC BLOOD PRESSURE < 130 MM HG: ICD-10-PCS | Mod: S$GLB,,, | Performed by: INTERNAL MEDICINE

## 2020-01-02 PROCEDURE — 99214 PR OFFICE/OUTPT VISIT, EST, LEVL IV, 30-39 MIN: ICD-10-PCS | Mod: S$GLB,,, | Performed by: INTERNAL MEDICINE

## 2020-01-02 PROCEDURE — 3074F SYST BP LT 130 MM HG: CPT | Mod: S$GLB,,, | Performed by: INTERNAL MEDICINE

## 2020-01-02 PROCEDURE — 99214 OFFICE O/P EST MOD 30 MIN: CPT | Mod: S$GLB,,, | Performed by: INTERNAL MEDICINE

## 2020-01-02 PROCEDURE — 3078F PR MOST RECENT DIASTOLIC BLOOD PRESSURE < 80 MM HG: ICD-10-PCS | Mod: S$GLB,,, | Performed by: INTERNAL MEDICINE

## 2020-01-02 PROCEDURE — 3008F PR BODY MASS INDEX (BMI) DOCUMENTED: ICD-10-PCS | Mod: S$GLB,,, | Performed by: INTERNAL MEDICINE

## 2020-01-02 PROCEDURE — 3008F BODY MASS INDEX DOCD: CPT | Mod: S$GLB,,, | Performed by: INTERNAL MEDICINE

## 2020-01-06 NOTE — PROGRESS NOTES
[facility] History & Physical    Subjective:      Patient ID:   Kitty Jennings  44 y.o. female  1975  Ana See NP      Chief Complaint:   Anemia eval.    HPI:  44 y.o. female with hx of Fe deficiency anemia, treated with oral Fe in the past.  Admits to fatigue decreasing.  Energy 7/10.  More time out of bed after a days work.  Intermittent hives since age 19.  Zertec and benadryl prn.  Dr. Chavez feels hives may be stress induced.  She does not get hives on the weekend, away from her job duties.    S/P gastric sleave surgery.  S/P Injectafer.  Ferritin is 220.  B 6 resolved peripheral neuropathy.  B 6 4 to 53 and now at 6.  Resume B 6 daily.    She completed Injectafer x's 5 months ago.  Stronger.  Hive sx have improved.  She saw Dr. Chavez.  Measures taken to control hives sx.  Hives sx controlled with Zertec 20 mg daily.     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 Allergy no.    Hx HTN, GERD, migraine HA.  Menses NL flow.    Gastric sleave surgery 13.    M0  Menses onset 11  1st live child at 22    No family hx of anemia.  Dad HTN, DM  Mom A & W  Sister HTN x's 2    ROS:   GEN: normal without any fever, night sweats or weight loss  HEENT: normal with no HA's, sore throat, stiff neck, changes in vision  CV: normal with no CP, SOB, PND, KAM or orthopnea  PULM: normal with no SOB, cough, hemoptysis, sputum or pleuritic pain  GI: normal with no abdominal pain, nausea, vomiting, constipation, diarrhea, melanotic stools, BRBPR, or hematemesis  : normal with no hematuria, dysuria  BREAST: normal with no mass, discharge, pain  SKIN: see HPI      Review of patient's allergies indicates:  No Known Allergies        Current Outpatient Medications:     cetirizine (ZYRTEC) 10 MG tablet, Take 1 tablet (10 mg total) by mouth 2 (two) times daily., Disp: 180 tablet, Rfl: 3    esomeprazole (NEXIUM) 40 MG capsule, Nexium 40 mg capsule,delayed release  TAKE ONE CAPSULE BY MOUTH DAILY,  Disp: 90 capsule, Rfl: 3    hydroCHLOROthiazide (HYDRODIURIL) 25 MG tablet, , Disp: , Rfl: 11    olmesartan (BENICAR) 40 MG tablet, Take 1 tablet (40 mg total) by mouth once daily., Disp: 90 tablet, Rfl: 3    propranolol (INDERAL LA) 160 mg 24 hr capsule, propranolol  mg capsule,24 hr,extended release, Disp: 90 capsule, Rfl: 3          Objective:   Vitals:  Blood pressure 114/70, pulse (!) 58, temperature 98.5 °F (36.9 °C), temperature source Oral, resp. rate 19, weight 117 kg (257 lb 14.4 oz).    She did not aggree to physical exam.    Hgb 12.5, ferritin 220..  Assessment:   (1) 44 y.o. female with diagnosis of Fe deficiency 2nd decreased absorption 2nd bariatric surgery.  Gave  Injectafer  Weekly x's 2 to replenish Fe stores.  Estimated 1-2% risk of reaction, she tolerated it well.  [facility].  Anemia resolved to NL, with Fe replenishment.    (2)Referred to Dr. Lori Chavez of allergy/ immunology to try clarify hives and Rx. Hives controlled with Zertec 20 mg daily.    On B 6 po, PN sx in feet resolved.    RTC 6 months with CBC, Ferritin and mammogram. 6/2020.        1. Iron deficiency anemia following bariatric surgery    2. Abnormal mammogram of right breast        Plan:       Iron deficiency anemia following bariatric surgery  -     CBC auto differential; Standing  -     Ferritin; Standing    Abnormal mammogram of right breast  -     Mammo Digital Diagnostic Right w/ Van; Future; Expected date: 06/15/2020  -      Breast Right Complete; Future; Expected date: 06/15/2020      Follow up in about 6 months (around 7/3/2020) for check of blood status after therapy, resolution of new problem check.

## 2020-03-16 ENCOUNTER — PATIENT MESSAGE (OUTPATIENT)
Dept: HEMATOLOGY/ONCOLOGY | Facility: CLINIC | Age: 45
End: 2020-03-16

## 2020-03-17 DIAGNOSIS — K21.9 GASTROESOPHAGEAL REFLUX DISEASE, ESOPHAGITIS PRESENCE NOT SPECIFIED: ICD-10-CM

## 2020-03-17 RX ORDER — ESOMEPRAZOLE MAGNESIUM 40 MG/1
CAPSULE, DELAYED RELEASE ORAL
Qty: 90 CAPSULE | Refills: 3 | OUTPATIENT
Start: 2020-03-17

## 2020-03-20 ENCOUNTER — TELEPHONE (OUTPATIENT)
Dept: HEMATOLOGY/ONCOLOGY | Facility: CLINIC | Age: 45
End: 2020-03-20

## 2020-03-20 NOTE — TELEPHONE ENCOUNTER
----- Message from Tania Angelo, Patient Care Assistant sent at 3/19/2020  3:55 PM CDT -----  Patient called in stating she was calling to check the status on her prior Auth. On her prescription = Nexium . She can be reached at [phone]

## 2020-03-20 NOTE — TELEPHONE ENCOUNTER
Insurance will fax out PA form to be completed.  Dr. Rolle will fill out once he gets here on Monday.

## 2020-03-24 ENCOUNTER — TELEPHONE (OUTPATIENT)
Dept: HEMATOLOGY/ONCOLOGY | Facility: CLINIC | Age: 45
End: 2020-03-24

## 2020-03-24 NOTE — TELEPHONE ENCOUNTER
----- Message from Ana Paula Wan sent at 3/23/2020  3:44 PM CDT -----  The patient said she took her last Nexium today. Please let her know once form is sent back to insurance.

## 2020-06-25 ENCOUNTER — HOSPITAL ENCOUNTER (OUTPATIENT)
Dept: RADIOLOGY | Facility: HOSPITAL | Age: 45
Discharge: HOME | End: 2020-06-25
Attending: INTERNAL MEDICINE
Payer: COMMERCIAL

## 2020-06-25 DIAGNOSIS — R92.8 ABNORMAL MAMMOGRAM OF RIGHT BREAST: ICD-10-CM

## 2020-06-25 PROCEDURE — 76641 ULTRASOUND BREAST COMPLETE: CPT | Mod: TC,PO,RT

## 2020-07-02 ENCOUNTER — OFFICE VISIT (OUTPATIENT)
Dept: HEMATOLOGY/ONCOLOGY | Facility: CLINIC | Age: 45
End: 2020-07-02
Payer: COMMERCIAL

## 2020-07-02 VITALS
RESPIRATION RATE: 12 BRPM | WEIGHT: 255.81 LBS | HEART RATE: 71 BPM | TEMPERATURE: 98 F | SYSTOLIC BLOOD PRESSURE: 106 MMHG | DIASTOLIC BLOOD PRESSURE: 71 MMHG | BODY MASS INDEX: 45.31 KG/M2

## 2020-07-02 DIAGNOSIS — K95.89 IRON DEFICIENCY ANEMIA FOLLOWING BARIATRIC SURGERY: Primary | ICD-10-CM

## 2020-07-02 DIAGNOSIS — E63.9 NUTRITIONAL DEFICIENCY: ICD-10-CM

## 2020-07-02 DIAGNOSIS — D50.8 IRON DEFICIENCY ANEMIA FOLLOWING BARIATRIC SURGERY: Primary | ICD-10-CM

## 2020-07-02 PROCEDURE — 3074F SYST BP LT 130 MM HG: CPT | Mod: S$GLB,,, | Performed by: INTERNAL MEDICINE

## 2020-07-02 PROCEDURE — 3008F BODY MASS INDEX DOCD: CPT | Mod: S$GLB,,, | Performed by: INTERNAL MEDICINE

## 2020-07-02 PROCEDURE — 99214 PR OFFICE/OUTPT VISIT, EST, LEVL IV, 30-39 MIN: ICD-10-PCS | Mod: S$GLB,,, | Performed by: INTERNAL MEDICINE

## 2020-07-02 PROCEDURE — 99214 OFFICE O/P EST MOD 30 MIN: CPT | Mod: S$GLB,,, | Performed by: INTERNAL MEDICINE

## 2020-07-02 PROCEDURE — 3078F PR MOST RECENT DIASTOLIC BLOOD PRESSURE < 80 MM HG: ICD-10-PCS | Mod: S$GLB,,, | Performed by: INTERNAL MEDICINE

## 2020-07-02 PROCEDURE — 3008F PR BODY MASS INDEX (BMI) DOCUMENTED: ICD-10-PCS | Mod: S$GLB,,, | Performed by: INTERNAL MEDICINE

## 2020-07-02 PROCEDURE — 3074F PR MOST RECENT SYSTOLIC BLOOD PRESSURE < 130 MM HG: ICD-10-PCS | Mod: S$GLB,,, | Performed by: INTERNAL MEDICINE

## 2020-07-02 PROCEDURE — 3078F DIAST BP <80 MM HG: CPT | Mod: S$GLB,,, | Performed by: INTERNAL MEDICINE

## 2020-07-04 NOTE — PROGRESS NOTES
[facility] hematology Oncology in office follow-up progress note  2020    Subjective:      Patient ID:   Kitty Jennings  45 y.o. female  1975  Ana See NP      Chief Complaint:   Anemia eval.    HPI:  45 y.o. female with hx of Fe deficiency anemia, treated with oral Fe in the past.  Admits to fatigue decreasing.  Energy /10.  More time out of bed after a days work.  Intermittent hives since age 19.  Zertec and benadryl prn.  Dr. Chavez feels hives may be stress induced.  She does not get hives on the weekend, away from her job duties.    S/P gastric sleave surgery.  S/P Injectafer.  Ferritin is 220.  B 6 resolved peripheral neuropathy.  B 6 4 to 53 and now at 6.  Resume B 6 daily.    She completed Injectafer x's 5 months ago.  Stronger.  Hive sx have improved.  She saw Dr. Chavez.  Measures taken to control hives sx.  Hives sx controlled with Zertec 20 mg daily.     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 Allergy no.    Hx HTN, GERD, migraine HA.  Menses NL flow.    Gastric sleave surgery 13.    M0  Menses onset 11  1st live child at 22    No family hx of anemia.  Dad HTN, DM  Mom A & W  Sister HTN x's 2    ROS:   GEN: normal without any fever, night sweats or weight loss  HEENT: normal with no HA's, sore throat, stiff neck, changes in vision  CV: normal with no CP, SOB, PND, KAM or orthopnea  PULM: normal with no SOB, cough, hemoptysis, sputum or pleuritic pain  GI: normal with no abdominal pain, nausea, vomiting, constipation, diarrhea, melanotic stools, BRBPR, or hematemesis  : normal with no hematuria, dysuria  BREAST: normal with no mass, discharge, pain  SKIN: see HPI      Review of patient's allergies indicates:  No Known Allergies        Current Outpatient Medications:     cetirizine (ZYRTEC) 10 MG tablet, Take 1 tablet (10 mg total) by mouth 2 (two) times daily., Disp: 180 tablet, Rfl: 3    esomeprazole (NEXIUM) 40 MG capsule, Nexium 40 mg  capsule,delayed release  TAKE ONE CAPSULE BY MOUTH DAILY, Disp: 90 capsule, Rfl: 3    hydroCHLOROthiazide (HYDRODIURIL) 25 MG tablet, , Disp: , Rfl: 11    propranolol (INDERAL LA) 160 mg 24 hr capsule, propranolol  mg capsule,24 hr,extended release, Disp: 90 capsule, Rfl: 3    olmesartan (BENICAR) 40 MG tablet, Take 1 tablet (40 mg total) by mouth once daily., Disp: 90 tablet, Rfl: 3          Objective:   Vitals:  Blood pressure 106/71, pulse 71, temperature 97.5 °F (36.4 °C), temperature source Oral, resp. rate 12, weight 116 kg (255 lb 12.8 oz).    She did not aggree to physical exam.    Hgb 12.5, ferritin 220..  Assessment:   (1) 45 y.o. female with diagnosis of Fe deficiency 2nd decreased absorption 2nd bariatric surgery.  Gave  Injectafer  Weekly x's 2 to replenish Fe stores.  Estimated 1-2% risk of reaction, she tolerated it well.  [facility].  Anemia resolved to NL, with Fe replenishment.    (2)Referred to Dr. Lori Chavez of allergy/ immunology to try clarify hives and Rx. Hives controlled with Zertec 20 mg daily.    On B 6 po, PN sx in feet resolved.    She has not had any recent labs.  I have ordered CBC, ferritin, paradox seen level at [facility] now and in 6 months.  She will return here in 6 months.        1. Iron deficiency anemia following bariatric surgery    2. Nutritional deficiency        Plan:       Iron deficiency anemia following bariatric surgery  -     Vitamin B12; Future; Expected date: 07/03/2020  -     Ferritin; Future; Expected date: 07/03/2020  -     CBC auto differential; Future; Expected date: 07/03/2020  -     Vitamin B6; Future; Expected date: 07/03/2020  -     Folate; Future; Expected date: 07/03/2020    Nutritional deficiency  -     Vitamin B12; Future; Expected date: 07/03/2020  -     Ferritin; Future; Expected date: 07/03/2020  -     CBC auto differential; Future; Expected date: 07/03/2020  -     Vitamin B6; Future; Expected date: 07/03/2020  -      Folate; Future; Expected date: 07/03/2020      Follow up in about 6 months (around 1/2/2021) for check of blood status after therapy.

## 2020-08-31 DIAGNOSIS — L50.8 CHRONIC URTICARIA: ICD-10-CM

## 2020-08-31 RX ORDER — CETIRIZINE HYDROCHLORIDE 10 MG/1
TABLET ORAL
Qty: 180 TABLET | Refills: 3 | OUTPATIENT
Start: 2020-08-31

## 2020-09-11 ENCOUNTER — OFFICE VISIT (OUTPATIENT)
Dept: ALLERGY | Facility: CLINIC | Age: 45
End: 2020-09-11
Payer: COMMERCIAL

## 2020-09-11 DIAGNOSIS — L50.8 CHRONIC URTICARIA: ICD-10-CM

## 2020-09-11 PROCEDURE — 99213 PR OFFICE/OUTPT VISIT, EST, LEVL III, 20-29 MIN: ICD-10-PCS | Mod: 95,,, | Performed by: ALLERGY & IMMUNOLOGY

## 2020-09-11 PROCEDURE — 99213 OFFICE O/P EST LOW 20 MIN: CPT | Mod: 95,,, | Performed by: ALLERGY & IMMUNOLOGY

## 2020-09-11 RX ORDER — CETIRIZINE HYDROCHLORIDE 10 MG/1
10 TABLET ORAL 2 TIMES DAILY
Qty: 180 TABLET | Refills: 4 | Status: SHIPPED | OUTPATIENT
Start: 2020-09-11 | End: 2022-02-01 | Stop reason: SDUPTHER

## 2020-09-11 NOTE — PROGRESS NOTES
Subjective:       Patient ID: Kitty Jennings is a 45 y.o. female.    Chief Complaint: Urticaria    Pt presents for urticaria.     At the last visit, she was started on doxepin, zyrtec bid, and zantac bid.   We are considering xolair if her breast mass is not cancerous. No calcified material. Does not seem to be cancerous at this time.     Pt has been doing well for the past year.  Pt notes off medications she has out breaks, took about a week off medications.   No angioedema since a year ago.   Zyrtec daily has controlled her symptoms.   She has some hoarseness.   Requires 1 zyrtec BID to be controlled. Doesn't require pepcid for control.     Condition: improved   Pt states they may have resolved in May.   Started in her teenage years  Associated facial swelling with hives, last week facial swelling.   Pt does mention a temporal association with eating shrimp of lip swelling and generalized urticaria. This was about 2-3 hours post ingestion.   Location generalized  Frequency comes and goes- currently daily   Typically will go away after a few months  Recently started working for [employer] x 8 years.   Most recently, for the past 8 years, more frequent.   She would feel a deep itch.   Tx: 2 zyrtec q am.      She did have one incident and her lips started swelling in July.   Concern that possible non fresh fish made her lip swell.   Had fish since that time without incident.     Saw another allergist: Dr. Waldron 5 years ago.   Prior had allergy testing   Serum and skin test was negative.         Review of Systems      General: neg unexpected weight changes, fevers, chills, night sweats, malaise  HEENT: see hpi, Neg eye pain, vision changes, ear drainage, nose bleeds, throat tightness, sores in the mouth  CV: Neg chest pain, palpitations, swelling  Resp: see hpi, neg shortness of breath, hemoptysis, cough  GI: see hpi, neg dysphagia, night abdominal pain, reflux, chronic diarrhea, chronic constipation  Derm: See Hpi,   neg flushing  Mu/sk: Neg joint pain, joint swelling   Psych: Neg anxiety  neuro: neg chronic headaches, muscle weakness  Endo: neg heat/cold intolerance, chronic fatigue    Objective:     There were no vitals filed for this visit.        No vitals due to telemed.     Physical Exam      General: no acute distress, well developed well nourished     Skin: neg lesions   Lymph: neg supraclavicular, axillary     Assessment:       1. Chronic urticaria        Plan:       Chronic urticaria  -     cetirizine (ZYRTEC) 10 MG tablet; Take 1 tablet (10 mg total) by mouth 2 (two) times daily.  Dispense: 180 tablet; Refill: 4    explained urticaria and action plan and education.   currently controlled on 20 mg zyrtec q am.   continue high dose antihistamines  Consider montelukast  Consider xolair       Follow up in 6-12 months.     The patient location is:  home   The chief complaint leading to consultation is: hives     Visit type: audiovisual    Face to Face time with patient: 15 mins   15 minutes of total time spent on the encounter, which includes face to face time and non-face to face time preparing to see the patient (eg, review of tests), Obtaining and/or reviewing separately obtained history, Documenting clinical information in the electronic or other health record, Independently interpreting results (not separately reported) and communicating results to the patient/family/caregiver, or Care coordination (not separately reported).         Each patient to whom he or she provides medical services by telemedicine is:  (1) informed of the relationship between the physician and patient and the respective role of any other health care provider with respect to management of the patient; and (2) notified that he or she may decline to receive medical services by telemedicine and may withdraw from such care at any time.    Notes:   .      Lori Chavez M.D.  Allergy/Immunology  [facility]   [phone]  phone  [phone] fax

## 2020-09-11 NOTE — PATIENT INSTRUCTIONS
Continue zyrtec as currently.     [phone]- primary care   Dr. Goyal - med/peds     Dr. Piyush Purcell         1 year follow up , sooner if needed. '    [phone] - [place] [phone]

## 2020-10-02 ENCOUNTER — LAB VISIT (OUTPATIENT)
Dept: LAB | Facility: HOSPITAL | Age: 45
End: 2020-10-02
Attending: NURSE PRACTITIONER
Payer: COMMERCIAL

## 2020-10-02 ENCOUNTER — OFFICE VISIT (OUTPATIENT)
Dept: FAMILY MEDICINE | Facility: CLINIC | Age: 45
End: 2020-10-02
Payer: COMMERCIAL

## 2020-10-02 ENCOUNTER — PATIENT MESSAGE (OUTPATIENT)
Dept: FAMILY MEDICINE | Facility: CLINIC | Age: 45
End: 2020-10-02

## 2020-10-02 VITALS
HEART RATE: 61 BPM | TEMPERATURE: 97 F | HEIGHT: 63 IN | SYSTOLIC BLOOD PRESSURE: 107 MMHG | BODY MASS INDEX: 44.94 KG/M2 | DIASTOLIC BLOOD PRESSURE: 76 MMHG | OXYGEN SATURATION: 99 % | WEIGHT: 253.63 LBS

## 2020-10-02 DIAGNOSIS — E63.9 NUTRITIONAL DEFICIENCY: ICD-10-CM

## 2020-10-02 DIAGNOSIS — R60.0 PERIPHERAL EDEMA: ICD-10-CM

## 2020-10-02 DIAGNOSIS — Z12.31 ENCOUNTER FOR SCREENING MAMMOGRAM FOR BREAST CANCER: ICD-10-CM

## 2020-10-02 DIAGNOSIS — D50.8 IRON DEFICIENCY ANEMIA FOLLOWING BARIATRIC SURGERY: ICD-10-CM

## 2020-10-02 DIAGNOSIS — K95.89 IRON DEFICIENCY ANEMIA FOLLOWING BARIATRIC SURGERY: ICD-10-CM

## 2020-10-02 DIAGNOSIS — Z12.4 SCREENING FOR CERVICAL CANCER: ICD-10-CM

## 2020-10-02 DIAGNOSIS — I10 HYPERTENSION, UNSPECIFIED TYPE: ICD-10-CM

## 2020-10-02 DIAGNOSIS — I10 HYPERTENSION, UNSPECIFIED TYPE: Primary | ICD-10-CM

## 2020-10-02 DIAGNOSIS — K21.9 GASTROESOPHAGEAL REFLUX DISEASE, UNSPECIFIED WHETHER ESOPHAGITIS PRESENT: ICD-10-CM

## 2020-10-02 DIAGNOSIS — K21.9 GASTROESOPHAGEAL REFLUX DISEASE: ICD-10-CM

## 2020-10-02 LAB
BASOPHILS # BLD AUTO: 0.04 K/UL (ref 0–0.2)
BASOPHILS NFR BLD: 0.8 % (ref 0–1.9)
CHOLEST SERPL-MCNC: 200 MG/DL (ref 120–199)
CHOLEST/HDLC SERPL: 4.8 {RATIO} (ref 2–5)
DIFFERENTIAL METHOD: NORMAL
EOSINOPHIL # BLD AUTO: 0.1 K/UL (ref 0–0.5)
EOSINOPHIL NFR BLD: 2.3 % (ref 0–8)
ERYTHROCYTE [DISTWIDTH] IN BLOOD BY AUTOMATED COUNT: 12.2 % (ref 11.5–14.5)
FERRITIN SERPL-MCNC: 52 NG/ML (ref 20–300)
FOLATE SERPL-MCNC: 5.5 NG/ML (ref 4–24)
HCT VFR BLD AUTO: 37.4 % (ref 37–48.5)
HDLC SERPL-MCNC: 42 MG/DL (ref 40–75)
HDLC SERPL: 21 % (ref 20–50)
HGB BLD-MCNC: 12.2 G/DL (ref 12–16)
IMM GRANULOCYTES # BLD AUTO: 0.01 K/UL (ref 0–0.04)
IMM GRANULOCYTES NFR BLD AUTO: 0.2 % (ref 0–0.5)
LDLC SERPL CALC-MCNC: 130.6 MG/DL (ref 63–159)
LYMPHOCYTES # BLD AUTO: 1.8 K/UL (ref 1–4.8)
LYMPHOCYTES NFR BLD: 35.2 % (ref 18–48)
MCH RBC QN AUTO: 29 PG (ref 27–31)
MCHC RBC AUTO-ENTMCNC: 32.6 G/DL (ref 32–36)
MCV RBC AUTO: 89 FL (ref 82–98)
MONOCYTES # BLD AUTO: 0.4 K/UL (ref 0.3–1)
MONOCYTES NFR BLD: 8.1 % (ref 4–15)
NEUTROPHILS # BLD AUTO: 2.8 K/UL (ref 1.8–7.7)
NEUTROPHILS NFR BLD: 53.4 % (ref 38–73)
NONHDLC SERPL-MCNC: 158 MG/DL
NRBC BLD-RTO: 0 /100 WBC
PLATELET # BLD AUTO: 242 K/UL (ref 150–350)
PMV BLD AUTO: 10.8 FL (ref 9.2–12.9)
RBC # BLD AUTO: 4.2 M/UL (ref 4–5.4)
TRIGL SERPL-MCNC: 137 MG/DL (ref 30–150)
TSH SERPL DL<=0.005 MIU/L-ACNC: 1.34 UIU/ML (ref 0.34–5.6)
VIT B12 SERPL-MCNC: 297 PG/ML (ref 210–950)
WBC # BLD AUTO: 5.17 K/UL (ref 3.9–12.7)

## 2020-10-02 PROCEDURE — 3008F BODY MASS INDEX DOCD: CPT | Mod: S$GLB,,, | Performed by: NURSE PRACTITIONER

## 2020-10-02 PROCEDURE — 99204 OFFICE O/P NEW MOD 45 MIN: CPT | Mod: S$GLB,,, | Performed by: NURSE PRACTITIONER

## 2020-10-02 PROCEDURE — 82728 ASSAY OF FERRITIN: CPT

## 2020-10-02 PROCEDURE — 85025 COMPLETE CBC W/AUTO DIFF WBC: CPT

## 2020-10-02 PROCEDURE — 3074F SYST BP LT 130 MM HG: CPT | Mod: S$GLB,,, | Performed by: NURSE PRACTITIONER

## 2020-10-02 PROCEDURE — 84443 ASSAY THYROID STIM HORMONE: CPT

## 2020-10-02 PROCEDURE — 3008F PR BODY MASS INDEX (BMI) DOCUMENTED: ICD-10-PCS | Mod: S$GLB,,, | Performed by: NURSE PRACTITIONER

## 2020-10-02 PROCEDURE — 82746 ASSAY OF FOLIC ACID SERUM: CPT

## 2020-10-02 PROCEDURE — 99204 PR OFFICE/OUTPT VISIT, NEW, LEVL IV, 45-59 MIN: ICD-10-PCS | Mod: S$GLB,,, | Performed by: NURSE PRACTITIONER

## 2020-10-02 PROCEDURE — 3078F DIAST BP <80 MM HG: CPT | Mod: S$GLB,,, | Performed by: NURSE PRACTITIONER

## 2020-10-02 PROCEDURE — 84207 ASSAY OF VITAMIN B-6: CPT

## 2020-10-02 PROCEDURE — 80061 LIPID PANEL: CPT

## 2020-10-02 PROCEDURE — 3074F PR MOST RECENT SYSTOLIC BLOOD PRESSURE < 130 MM HG: ICD-10-PCS | Mod: S$GLB,,, | Performed by: NURSE PRACTITIONER

## 2020-10-02 PROCEDURE — 3078F PR MOST RECENT DIASTOLIC BLOOD PRESSURE < 80 MM HG: ICD-10-PCS | Mod: S$GLB,,, | Performed by: NURSE PRACTITIONER

## 2020-10-02 PROCEDURE — 82607 VITAMIN B-12: CPT

## 2020-10-02 RX ORDER — HYDROCHLOROTHIAZIDE 25 MG/1
25 TABLET ORAL DAILY
Qty: 90 TABLET | Refills: 1 | Status: SHIPPED | OUTPATIENT
Start: 2020-10-02 | End: 2021-09-13

## 2020-10-02 RX ORDER — OLMESARTAN MEDOXOMIL 40 MG/1
40 TABLET ORAL DAILY
Qty: 90 TABLET | Refills: 1 | Status: SHIPPED | OUTPATIENT
Start: 2020-10-02 | End: 2021-03-23 | Stop reason: SDUPTHER

## 2020-10-02 RX ORDER — ESOMEPRAZOLE MAGNESIUM 40 MG/1
CAPSULE, DELAYED RELEASE ORAL
Qty: 90 CAPSULE | Refills: 1 | Status: SHIPPED | OUTPATIENT
Start: 2020-10-02 | End: 2021-03-23 | Stop reason: SDUPTHER

## 2020-10-02 RX ORDER — PROPRANOLOL HYDROCHLORIDE 160 MG/1
CAPSULE, EXTENDED RELEASE ORAL
Qty: 90 CAPSULE | Refills: 1 | Status: SHIPPED | OUTPATIENT
Start: 2020-10-02 | End: 2021-03-23 | Stop reason: SDUPTHER

## 2020-10-02 NOTE — PATIENT INSTRUCTIONS
"  GERD (Adult)    The esophagus is a tube that carries food from the mouth to the stomach. A valve at the lower end of the esophagus prevents stomach acid from flowing upward. When this valve doesn't work properly, stomach contents may repeatedly flow back up (reflux) into the esophagus. This is called gastroesophageal reflux disease (GERD). GERD can irritate the esophagus. It can cause problems with swallowing or breathing. In severe cases, GERD can cause recurrent pneumonia or other serious problems.  Symptoms of reflux include burning, pressure or sharp pain in the upper abdomen or mid to lower chest. The pain can spread to the neck, back, or shoulder. There may be belching, an acid taste in the back of the throat, chronic cough, or sore throat or hoarseness. GERD symptoms often occur during the day after a big meal. They can also occur at night when lying down.   Home care  Lifestyle changes can help reduce symptoms. If needed, medicines may be prescribed. Symptoms often improve with treatment, but if treatment is stopped, the symptoms often return after a few months. So most persons with GERD will need to continue treatment.  Lifestyle changes  · Limit or avoid fatty, fried, and spicy foods, as well as coffee, chocolate, mint, and foods with high acid content such as tomatoes and citrus fruit and juices (orange, grapefruit, lemon).  · Dont eat large meals, especially at night. Frequent, smaller meals are best. Do not lie down right after eating. And dont eat anything 3 hours before going to bed.  · Avoid drinking alcohol and smoking. As much as possible, stay away from second hand smoke.  · If you are overweight, losing weight will reduce symptoms.   · Avoid wearing tight clothing around your stomach area.  · If your symptoms occur during sleep, use a foam wedge to elevate your upper body (not just your head.) Or, place 4" blocks under the head of your bed.  Medicines  If needed, medicines can help relieve " the symptoms of GERD and prevent damage to the esophagus. Discuss a medicine plan with your healthcare provider. This may include one or more of the following medicines:  · Antacids to help neutralize the normal acids in your stomach.  · Acid blockers (H2 blockers) to decrease acid production.  · Acid inhibitors (PPIs) to decrease acid production in a different way than the blockers. They may work better, but can take a little longer to take effect.  Take an antacid 30-60 minutes after eating and at bedtime, but not at the same time as an acid blocker.  Try not to take medicines such as ibuprofen and aspirin. If you are taking aspirin for your heart or other medical reasons, talk to your healthcare provider about stopping it.  Follow-up care  Follow up with your healthcare provider or as advised by our staff.  When to seek medical advice  Call your healthcare provider if any of the following occur:  · Stomach pain gets worse or moves to the lower right abdomen (appendix area)  · Chest pain appears or gets worse, or spreads to the back, neck, shoulder, or arm  · Frequent vomiting (cant keep down liquids)  · Blood in the stool or vomit (red or black in color)  · Feeling weak or dizzy  · Fever of 100.4ºF (38ºC) or higher, or as directed by your healthcare provider  Date Last Reviewed: 6/23/2015 © 2000-2017 [employer]. [address] [place] [address], [place] [address]. All rights reserved. This information is not intended as a substitute for professional medical care. Always follow your healthcare professional's instructions.        Established High Blood Pressure    High blood pressure (hypertension) is a chronic disease. Often, healthcare providers dont know what causes it. But it can be caused by certain health conditions and medicines.  If you have high blood pressure, you may not have any symptoms. If you do have symptoms, they may include headache, dizziness, changes in your vision, chest pain, and  shortness of breath. But even without symptoms, high blood pressure thats not treated raises your risk for heart attack and stroke. High blood pressure is a serious health risk and shouldnt be ignored.  A blood pressure reading is made up of two numbers: a higher number over a lower number. The top number is the systolic pressure. The bottom number is the diastolic pressure. A normal blood pressure is a systolic pressure of  less than 120 over a diastolic pressure of less than 80. You will see your blood pressure readings written together. For example, a person with a systolic pressure of 188 and a diastolic pressure of 78 will have 118/78 written in the medical record.  High blood pressure is when either the top number is 140 or higher, or the bottom number is 90 or higher. This must be the result when taking your blood pressure a number of times. The blood pressures between normal and high are called prehypertension.  Home care  If you have high blood pressure, you should do what is listed below to lower your blood pressure. If you are taking medicines for high blood pressure, these methods may reduce or end your need for medicines in the future.  · Begin a weight-loss program if you are overweight.  · Cut back on how much salt you get in your diet. Heres how to do this:  ¨ Dont eat foods that have a lot of salt. These include olives, pickles, smoked meats, and salted potato chips.  ¨ Dont add salt to your food at the table.  ¨ Use only small amounts of salt when cooking.  · Start an exercise program. Talk with your healthcare provider about the type of exercise program that would be best for you. It doesn't have to be hard. Even brisk walking for 20 minutes 3 times a week is a good form of exercise.  · Dont take medicines that stimulate the heart. This includes many over-the-counter cold and sinus decongestant pills and sprays, as well as diet pills. Check the warnings about hypertension on the label.  Before buying any over-the-counter medicines or supplements, always ask the pharmacist about the product's potential interaction with your high blood pressure and your high blood pressure medicines.  · Stimulants such as amphetamine or cocaine could be deadly for someone with high blood pressure. Never take these.  · Limit how much caffeine you get in your diet. Switch to caffeine-free products.  · Stop smoking. If you are a long-time smoker, this can be hard. Talk to your healthcare provider about medicines and nicotine replacement options to help you. Also, enroll in a stop-smoking program to make it more likely that you will quit for good.  · Learn how to handle stress. This is an important part of any program to lower blood pressure. Learn about relaxation methods like meditation, yoga, or biofeedback.  · If your provider prescribed medicines, take them exactly as directed. Missing doses may cause your blood pressure get out of control.  · If you miss a dose or doses, check with your healthcare provider or pharmacist about what to do.  · Consider buying an automatic blood pressure machine. Ask your provider for a recommendation. You can get one of these at most pharmacies.     The American Heart Association recommends the following guidelines for home blood pressure monitoring:  · Don't smoke or drink coffee for 30 minutes before taking your blood pressure.  · Go to the bathroom before the test.  · Relax for 5 minutes before taking the measurement.  · Sit with your back supported (don't sit on a couch or soft chair); keep your feet on the floor uncrossed. Place your arm on a solid flat surface (like a table) with the upper part of the arm at heart level. Place the middle of the cuff directly above the eye of the elbow. Check the monitor's instruction manual for an illustration.  · Take multiple readings. When you measure, take 2 to 3 readings one minute apart and record all of the results.  · Take your blood  pressure at the same time every day, or as your healthcare provider recommends.  · Record the date, time, and blood pressure reading.  · Take the record with you to your next medical appointment. If your blood pressure monitor has a built-in memory, simply take the monitor with you to your next appointment.  · Call your provider if you have several high readings. Don't be frightened by a single high blood pressure reading, but if you get several high readings, check in with your healthcare provider.  · Note: When blood pressure reaches a systolic (top number) of 180 or higher OR diastolic (bottom number) of 110 or higher, seek emergency medical treatment.  Follow-up care  You will need to see your healthcare provider regularly. This is to check your blood pressure and to make changes to your medicines. Make a follow-up appointment as directed. Bring the record of your home blood pressure readings to the appointment.  When to seek medical advice  Call your healthcare provider right away if any of these occur:  · Blood pressure reaches a systolic (upper number) of 180 or higher OR a diastolic (bottom number) of 110 or higher  · Chest pain or shortness of breath  · Severe headache  · Throbbing or rushing sound in the ears  · Nosebleed  · Sudden severe pain in your belly (abdomen)  · Extreme drowsiness, confusion, or fainting  · Dizziness or spinning sensation (vertigo)  · Weakness of an arm or leg or one side of the face  · You have problems speaking or seeing   Date Last Reviewed: 12/1/2016  © 3562-2332 [employer]. [address] [place] [address]. All rights reserved. This information is not intended as a substitute for professional medical care. Always follow your healthcare professional's instructions.        Exercise for a Healthier Heart  You may wonder how you can improve the health of your heart. If youre thinking about exercise, youre on the right track. You dont need to become an  athlete, but you do need a certain amount of brisk exercise to help strengthen your heart. If you have been diagnosed with a heart condition, your doctor may recommend exercise to help stabilize your condition. To help make exercise a habit, choose safe, fun activities.     Exercise with a friend. When activity is fun, you're more likely to stick with it.     Be sure to check with your healthcare provider before starting an exercise program.   Why exercise?  Exercising regularly offers many healthy rewards. It can help you do all of the following:  · Improve your blood cholesterol level to help prevent further heart trouble  · Lower your blood pressure to help prevent a stroke or heart attack  · Control diabetes, or reduce your risk of getting this disease  · Improve your heart and lung function  · Reach and maintain a healthy weight  · Make your muscles stronger and more limber so you can stay active  · Prevent falls and fractures by slowing the loss of bone mass (osteoporosis)  · Manage stress better  · Reduce your blood pressure  · Improve your sense of self and your body image  Exercise tips  Ease into your routine. Set small goals. Then build on them.  Exercise on most days. Aim for a total of 150 or more minutes of moderate to  vigorous intensity activity each week. Consider 40 minutes, 3 to 4 times a week. For best results, activity should last for 40 minutes on average. It is OK to work up to the 40 minute period over time. Examples of moderate-intensity activity is walking 1 mile in 15 minutes or 30 to 45 minutes of yard work.  Step up your daily activity level. Along with your exercise program, try being more active throughout the day. Walk instead of drive. Do more household tasks or yard work.  Choose one or more activities you enjoy. Walking is one of the easiest things you can do. You can also try swimming, riding a bike, dancing, or taking an exercise class.  Stop exercising and call your doctor if  you:  · Have chest pain or feel dizzy or lightheaded  · Feel burning, tightness, pressure, or heaviness in your chest, neck, shoulders, back, or arms  · Have unusual shortness of breath  · Have increased joint or muscle pain  · Have palpitations or an irregular heartbeat   Date Last Reviewed: 5/1/2016  © 1874-4657 [employer]. [address] [place], [place] [id number]. All rights reserved. This information is not intended as a substitute for professional medical care. Always follow your healthcare professional's instructions.

## 2020-10-02 NOTE — PROGRESS NOTES
SUBJECTIVE:      Patient ID: Kitty Jennings is a 45 y.o. female.    Chief Complaint: Establish Care    New patient here to establish care with me today.  She has a past medical history of hypertension, bariatric surgery, iron deficiency anemia, breast masses which were benign, and chronic GERD.  She has been seeing Dr. Victor with hematology for monitoring of her anemia and nutritional deficiencies.  She had a visit with him in July but did not complete her blood work and wants to do this today.  She is taking her medications for hypertension as prescribed without side effects or complaints.  She denies any history of heart problems and had a cardiac workup in 2019 which was normal.  She needs refills of her medications today.  She does report some intermittent peripheral edema which occurs after sitting all day at her desk.  She is working from home now and is not moving as much.  She has tried to start moving more and is drinking more water to resolve this.  She has also worn Bj hose previously which significantly improved her problems.  Also, She has been on Nexium for several years and wants to know she should follow-up with GI.  She cannot miss a dose of her Nexium or she has severe epigastric burning and heartburn.  She denies any changes in her bowel habits or dark or bloody stools.    HM: mammogram due in Dec 2020, needs new gyn- last pap 3 years ago normal; declines flu shot     Hypertension  This is a chronic problem. The current episode started more than 1 year ago. The problem is unchanged. The problem is controlled. Associated symptoms include peripheral edema. Pertinent negatives include no anxiety, blurred vision, chest pain, headaches, malaise/fatigue, palpitations, shortness of breath or sweats. There are no associated agents to hypertension. Risk factors for coronary artery disease include obesity, sedentary lifestyle and family history. Past treatments include beta blockers, diuretics and  angiotensin blockers. The current treatment provides significant improvement. Compliance problems include exercise and diet.  There is no history of angina, kidney disease, CAD/MI, CVA or heart failure. There is no history of sleep apnea or a thyroid problem.   Gastroesophageal Reflux  She complains of heartburn. She reports no abdominal pain, no belching, no chest pain, no choking, no coughing, no dysphagia, no early satiety, no hoarse voice, no nausea, no sore throat or no wheezing. The current episode started more than 1 year ago. The problem occurs frequently. The problem has been waxing and waning. The heartburn does not wake her from sleep. The heartburn does not limit her activity. The heartburn changes with position. The symptoms are aggravated by certain foods, lying down and stress. Associated symptoms include anemia. Pertinent negatives include no fatigue, melena, orthopnea or weight loss. Risk factors include obesity. She has tried a PPI and a diet change for the symptoms. The treatment provided moderate relief. Past procedures include an EGD and a UGI.   Anemia  Presents for initial visit. There has been no abdominal pain, bruising/bleeding easily, fever, malaise/fatigue, pallor, palpitations or weight loss. Signs of blood loss that are not present include melena, menorrhagia and vaginal bleeding. Past treatments include oral iron supplements. There is no history of heart failure. Procedure history includes EGD. There are no compliance problems.  Compliance with medications is %.       Family History   Problem Relation Age of Onset    Hypertension Mother     Diabetes type II Mother     Diverticulosis Mother     Hypertension Father     Heart disease Father     Diabetes type II Father     Hypertension Sister     Hypertension Brother     Ovarian cancer Sister     No Known Problems Sister     No Known Problems Daughter       Social History     Socioeconomic History    Marital status:  Single     Spouse name: Not on file    Number of children: Not on file    Years of education: Not on file    Highest education level: Not on file   Occupational History    Not on file   Social Needs    Financial resource strain: Not hard at all    Food insecurity     Worry: Never true     Inability: Never true    Transportation needs     Medical: No     Non-medical: No   Tobacco Use    Smoking status: Never Smoker    Smokeless tobacco: Never Used   Substance and Sexual Activity    Alcohol use: No     Frequency: Never    Drug use: No    Sexual activity: Yes   Lifestyle    Physical activity     Days per week: 0 days     Minutes per session: 0 min    Stress: To some extent   Relationships    Social connections     Talks on phone: Three times a week     Gets together: Three times a week     Attends [employer] service: 1 to 4 times per year     Active member of club or organization: Yes     Attends meetings of clubs or organizations: More than 4 times per year     Relationship status:    Other Topics Concern    Not on file   Social History Narrative    Not on file     Current Outpatient Medications   Medication Sig Dispense Refill    cetirizine (ZYRTEC) 10 MG tablet Take 1 tablet (10 mg total) by mouth 2 (two) times daily. 180 tablet 4    esomeprazole (NEXIUM) 40 MG capsule Nexium 40 mg capsule,delayed release   TAKE ONE CAPSULE BY MOUTH DAILY 90 capsule 3    hydroCHLOROthiazide (HYDRODIURIL) 25 MG tablet Take 1 tablet (25 mg total) by mouth once daily. 90 tablet 1    olmesartan (BENICAR) 40 MG tablet Take 1 tablet (40 mg total) by mouth once daily. 90 tablet 1    propranoloL (INDERAL LA) 160 mg 24 hr capsule propranolol  mg capsule,24 hr,extended release 90 capsule 1     No current facility-administered medications for this visit.      Review of patient's allergies indicates:  No Known Allergies   Past Medical History:   Diagnosis Date    Anemia     GERD (gastroesophageal reflux  "disease)     Hives of unknown origin     Hx of migraines     Hypertension      Past Surgical History:   Procedure Laterality Date    BARIATRIC SURGERY  09/11/2013    BREAST SURGERY      reduction    gastric sleeve      lipoma removal      TOTAL REDUCTION MAMMOPLASTY Bilateral 2018       Review of Systems   Constitutional: Negative for appetite change, chills, fatigue, fever, malaise/fatigue, unexpected weight change and weight loss.   HENT: Negative for congestion, ear discharge, ear pain, hoarse voice, rhinorrhea, sore throat, trouble swallowing and voice change.    Eyes: Negative for blurred vision, pain and visual disturbance.   Respiratory: Negative for cough, choking, shortness of breath and wheezing.    Cardiovascular: Negative for chest pain, palpitations and leg swelling.   Gastrointestinal: Positive for heartburn. Negative for abdominal pain, blood in stool, constipation, diarrhea, dysphagia, melena, nausea and vomiting.        GERD    Endocrine: Negative for cold intolerance, heat intolerance, polydipsia and polyuria.   Genitourinary: Negative for difficulty urinating, dysuria, frequency, hematuria, menorrhagia, menstrual problem, pelvic pain, vaginal bleeding and vaginal discharge.   Musculoskeletal: Negative for arthralgias and myalgias.   Skin: Negative for pallor and rash.   Neurological: Negative for dizziness, syncope, weakness and headaches.   Hematological: Negative for adenopathy. Does not bruise/bleed easily.   Psychiatric/Behavioral: Negative for dysphoric mood and sleep disturbance. The patient is not nervous/anxious.       OBJECTIVE:      Vitals:    10/02/20 0812   BP: 107/76   BP Location: Left arm   Patient Position: Sitting   BP Method: X-Large (Automatic)   Pulse: 61   Temp: 97.3 °F (36.3 °C)   TempSrc: Temporal   SpO2: 99%   Weight: 115 kg (253 lb 9.6 oz)   Height: 5' 3" (1.6 m)     Physical Exam  Vitals signs and nursing note reviewed.   Constitutional:       General: She is not " in acute distress.     Appearance: She is well-developed. She is obese. She is not ill-appearing.   HENT:      Head: Normocephalic and atraumatic.      Right Ear: External ear normal.      Left Ear: External ear normal.      Nose: Nose normal.      Mouth/Throat:      Mouth: Mucous membranes are moist.      Pharynx: Oropharynx is clear. No oropharyngeal exudate.   Eyes:      General: No scleral icterus.     Extraocular Movements: Extraocular movements intact.      Conjunctiva/sclera: Conjunctivae normal.      Pupils: Pupils are equal, round, and reactive to light.   Neck:      Musculoskeletal: Normal range of motion and neck supple.      Thyroid: No thyroid mass or thyromegaly.      Trachea: Trachea normal.   Cardiovascular:      Rate and Rhythm: Normal rate and regular rhythm.      Pulses: Normal pulses.      Heart sounds: Normal heart sounds. No murmur.   Pulmonary:      Effort: Pulmonary effort is normal. No respiratory distress.      Breath sounds: Normal breath sounds. No wheezing or rales.   Abdominal:      General: Bowel sounds are normal. There is no distension.      Palpations: Abdomen is soft. There is no mass.      Tenderness: There is no abdominal tenderness.   Musculoskeletal: Normal range of motion.      Right lower leg: Edema (trace edema ) present.      Left lower leg: Edema (trace edma ) present.   Lymphadenopathy:      Cervical: No cervical adenopathy.   Skin:     General: Skin is warm and dry.      Coloration: Skin is not pale.      Findings: No rash.   Neurological:      Mental Status: She is alert and oriented to person, place, and time.   Psychiatric:         Mood and Affect: Mood normal.         Behavior: Behavior normal.         Thought Content: Thought content normal.         Judgment: Judgment normal.        Assessment:       1. Hypertension, unspecified type    2. Iron deficiency anemia following bariatric surgery    3. Gastroesophageal reflux disease, unspecified whether esophagitis  present    4. Peripheral edema    5. Encounter for screening mammogram for breast cancer    6. Screening for cervical cancer    7. BMI 40.0-44.9, adult    8. Nutritional deficiency        Plan:       Hypertension, unspecified type  -     Lipid Panel; Future; Expected date: 10/02/2020  -     TSH; Future; Expected date: 10/02/2020  -     hydroCHLOROthiazide (HYDRODIURIL) 25 MG tablet; Take 1 tablet (25 mg total) by mouth once daily.  Dispense: 90 tablet; Refill: 1  -     olmesartan (BENICAR) 40 MG tablet; Take 1 tablet (40 mg total) by mouth once daily.  Dispense: 90 tablet; Refill: 1  -     propranoloL (INDERAL LA) 160 mg 24 hr capsule; propranolol  mg capsule,24 hr,extended release  Dispense: 90 capsule; Refill: 1    *Lifestyle changes: Reduce the amount of salt in your diet; Lose weight; Avoid drinking too much alcohol; Exercise at least 30 minutes per day most days of the week.  Continue current medications and home BP monitoring.     Iron deficiency anemia following bariatric surgery  -     CBC auto differential; Future; Expected date: 10/02/2020  -     Ferritin; Future; Expected date: 10/02/2020    Gastroesophageal reflux disease, unspecified whether esophagitis present  -     Ambulatory referral/consult to Gastroenterology; Future; Expected date: 10/09/2020    *Discussed anti-reflux measures like small frequent meals, avoidance of spicy and greasy food. Elevate head of bed if needed and avoid eating 4 hrs before bed.  Limit NSAID use. Take medications as prescribed.     Peripheral edema   *discussed increased movement, increased water and wear MAGDALENA hose     Encounter for screening mammogram for breast cancer  -     Mammo Digital Screening Bilat w/ Van; Future; Expected date: 12/02/2020    Screening for cervical cancer  -     Ambulatory referral/consult to Obstetrics / Gynecology; Future; Expected date: 10/09/2020    BMI 40.0-44.9, adult    Nutritional deficiency  -     Vitamin B12; Future; Expected date:  10/02/2020  -     Folate; Future; Expected date: 10/02/2020  -     Vitamin B6; Future; Expected date: 10/02/2020        Follow up in about 6 months (around 4/2/2021) for f/u HTN, edema.      10/2/2020 HIMANSHU Villa, FNP

## 2020-10-07 ENCOUNTER — PATIENT MESSAGE (OUTPATIENT)
Dept: FAMILY MEDICINE | Facility: CLINIC | Age: 45
End: 2020-10-07

## 2020-10-07 LAB — VIT B6 SERPL-MCNC: 2.8 UG/L (ref 2–32.8)

## 2020-12-28 ENCOUNTER — OFFICE VISIT (OUTPATIENT)
Dept: HEMATOLOGY/ONCOLOGY | Facility: CLINIC | Age: 45
End: 2020-12-28
Payer: COMMERCIAL

## 2020-12-28 ENCOUNTER — HOSPITAL ENCOUNTER (OUTPATIENT)
Dept: RADIOLOGY | Facility: HOSPITAL | Age: 45
Discharge: HOME | End: 2020-12-28
Attending: NURSE PRACTITIONER
Payer: COMMERCIAL

## 2020-12-28 VITALS
SYSTOLIC BLOOD PRESSURE: 114 MMHG | DIASTOLIC BLOOD PRESSURE: 64 MMHG | WEIGHT: 256 LBS | RESPIRATION RATE: 18 BRPM | BODY MASS INDEX: 45.35 KG/M2 | HEART RATE: 50 BPM

## 2020-12-28 VITALS — WEIGHT: 253.5 LBS | BODY MASS INDEX: 44.92 KG/M2 | HEIGHT: 63 IN

## 2020-12-28 DIAGNOSIS — D50.8 IRON DEFICIENCY ANEMIA FOLLOWING BARIATRIC SURGERY: Primary | ICD-10-CM

## 2020-12-28 DIAGNOSIS — K95.89 IRON DEFICIENCY ANEMIA FOLLOWING BARIATRIC SURGERY: Primary | ICD-10-CM

## 2020-12-28 DIAGNOSIS — D51.8 ANEMIA OF DECREASED VITAMIN B12 ABSORPTION: ICD-10-CM

## 2020-12-28 DIAGNOSIS — Z12.31 ENCOUNTER FOR SCREENING MAMMOGRAM FOR BREAST CANCER: ICD-10-CM

## 2020-12-28 PROCEDURE — 3074F PR MOST RECENT SYSTOLIC BLOOD PRESSURE < 130 MM HG: ICD-10-PCS | Mod: S$GLB,,, | Performed by: INTERNAL MEDICINE

## 2020-12-28 PROCEDURE — 99214 PR OFFICE/OUTPT VISIT, EST, LEVL IV, 30-39 MIN: ICD-10-PCS | Mod: S$GLB,,, | Performed by: INTERNAL MEDICINE

## 2020-12-28 PROCEDURE — 3078F DIAST BP <80 MM HG: CPT | Mod: S$GLB,,, | Performed by: INTERNAL MEDICINE

## 2020-12-28 PROCEDURE — 1126F PR PAIN SEVERITY QUANTIFIED, NO PAIN PRESENT: ICD-10-PCS | Mod: S$GLB,,, | Performed by: INTERNAL MEDICINE

## 2020-12-28 PROCEDURE — 3008F BODY MASS INDEX DOCD: CPT | Mod: S$GLB,,, | Performed by: INTERNAL MEDICINE

## 2020-12-28 PROCEDURE — 3078F PR MOST RECENT DIASTOLIC BLOOD PRESSURE < 80 MM HG: ICD-10-PCS | Mod: S$GLB,,, | Performed by: INTERNAL MEDICINE

## 2020-12-28 PROCEDURE — 77067 SCR MAMMO BI INCL CAD: CPT | Mod: TC,PO

## 2020-12-28 PROCEDURE — 99214 OFFICE O/P EST MOD 30 MIN: CPT | Mod: S$GLB,,, | Performed by: INTERNAL MEDICINE

## 2020-12-28 PROCEDURE — 1126F AMNT PAIN NOTED NONE PRSNT: CPT | Mod: S$GLB,,, | Performed by: INTERNAL MEDICINE

## 2020-12-28 PROCEDURE — 3008F PR BODY MASS INDEX (BMI) DOCUMENTED: ICD-10-PCS | Mod: S$GLB,,, | Performed by: INTERNAL MEDICINE

## 2020-12-28 PROCEDURE — 3074F SYST BP LT 130 MM HG: CPT | Mod: S$GLB,,, | Performed by: INTERNAL MEDICINE

## 2020-12-28 RX ORDER — SODIUM CHLORIDE 9 MG/ML
INJECTION, SOLUTION INTRAVENOUS CONTINUOUS
Status: CANCELLED | OUTPATIENT
Start: 2020-12-28

## 2020-12-28 RX ORDER — HEPARIN 100 UNIT/ML
5 SYRINGE INTRAVENOUS
Status: CANCELLED | OUTPATIENT
Start: 2020-12-28

## 2020-12-28 RX ORDER — SODIUM CHLORIDE 0.9 % (FLUSH) 0.9 %
10 SYRINGE (ML) INJECTION
Status: CANCELLED | OUTPATIENT
Start: 2020-12-28

## 2020-12-29 NOTE — PROGRESS NOTES
[facility] hematology Oncology in office follow-up progress note  2020    Subjective:      Patient ID:   Kitty Jennings  45 y.o. female  1975  Cecilia White NP      Chief Complaint:   Anemia eval.    HPI:  45 y.o. female with hx of Fe deficiency anemia, treated with oral Fe in the past.  Admits to fatigue decreasing.  Energy 7/10.  More time out of bed after a days work.  Intermittent hives since age 19.  Zertec and benadryl prn.  Dr. Chavez feels hives may be stress induced.  She does not get hives on the weekend, away from her job duties.    S/P gastric sleave surgery.  S/P Injectafer.  Ferritin is 220 to 54 now.  B 6 resolved peripheral neuropathy.  B 6 4 to 53 and now at 2.8.  Resume B 6 daily.  B 12 is 297.  She has not been taking her B 12 or B 6 supplements.  Begin B 12 and B6 po now.  Ferritin is on the way down at 54.  She agrees to injectafer infusion now.    She completed Injectafer x's 5 months ago.  Stronger.  Hive sx have improved.  She saw Dr. Chavez.  Measures taken to control hives sx.  Hives sx controlled with Zertec 20 mg daily.     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  Smoke no.  ETOH no. Allergy no.    Hx HTN, GERD, migraine HA.  Menses NL flow.    Gastric sleave surgery 13.    M0  Menses onset 11  1st live child at 22    No family hx of anemia.  Dad HTN, DM  Mom A & W  Sister HTN x's 2    ROS:   GEN: normal without any fever, night sweats or weight loss  HEENT: normal with no HA's, sore throat, stiff neck, changes in vision  CV: normal with no CP, SOB, PND, KAM or orthopnea  PULM: normal with no SOB, cough, hemoptysis, sputum or pleuritic pain  GI: normal with no abdominal pain, nausea, vomiting, constipation, diarrhea, melanotic stools, BRBPR, or hematemesis  : normal with no hematuria, dysuria  BREAST: normal with no mass, discharge, pain  SKIN: see HPI      Review of patient's allergies indicates:  No Known Allergies        Current Outpatient  Medications:     cetirizine (ZYRTEC) 10 MG tablet, Take 1 tablet (10 mg total) by mouth 2 (two) times daily., Disp: 180 tablet, Rfl: 4    esomeprazole (NEXIUM) 40 MG capsule, TAKE 1 CAPSULE BY MOUTH EVERY DAY, Disp: 90 capsule, Rfl: 3    hydroCHLOROthiazide (HYDRODIURIL) 25 MG tablet, Take 1 tablet (25 mg total) by mouth once daily., Disp: 90 tablet, Rfl: 1    olmesartan (BENICAR) 40 MG tablet, TAKE 1 TABLET BY MOUTH EVERY DAY, Disp: 90 tablet, Rfl: 3    propranoloL (INDERAL LA) 160 mg 24 hr capsule, TAKE 1 CAPSULE BY MOUTH EVERY DAY, Disp: 90 capsule, Rfl: 3    esomeprazole (NEXIUM) 40 MG capsule, Nexium 40 mg capsule,delayed release  TAKE ONE CAPSULE BY MOUTH DAILY, Disp: 90 capsule, Rfl: 1    ferric carboxymaltose (INJECTAFER) 50 iron mg/mL injection, Inject 15 mLs (750 mg total) into the vein once. Injectafer 750 mg reconstituted by pharmacy, IVPB over 1 hour x's 1 dose. Observe for 1 hour after this infusion. [facility] out pt, Attention Narcisa for 1 dose, Disp: 15 mL, Rfl: 0    olmesartan (BENICAR) 40 MG tablet, Take 1 tablet (40 mg total) by mouth once daily., Disp: 90 tablet, Rfl: 1    propranoloL (INDERAL LA) 160 mg 24 hr capsule, propranolol  mg capsule,24 hr,extended release, Disp: 90 capsule, Rfl: 1          Objective:   Vitals:  Blood pressure 114/64, pulse (!) 50, resp. rate 18, weight 116.1 kg (256 lb).    She did not aggree to physical exam.    Assessment:   (1) 45 y.o. female with diagnosis of Fe deficiency 2nd decreased absorption 2nd bariatric surgery.  Gave  Injectafer  Weekly x's 2 to replenish Fe stores.  Estimated 1-2% risk of reaction, she tolerated it well.  [facility].  Anemia resolved to NL, with Fe replenishment.    (2)Referred to Dr. Lori Chavez of allergy/ immunology to try clarify hives and Rx. Hives controlled with Zertec 20 mg daily.    On B 6 po, PN sx in feet resolved.    Orders placed for injectafer infusion x's 1 at .  Resume B 6 and B 12 po  now.    CBC, B 12, B 6, ferritin and RTC 6 months.        1. Iron deficiency anemia following bariatric surgery    2. Anemia of decreased vitamin B12 absorption        Plan:       Iron deficiency anemia following bariatric surgery  -     ferric carboxymaltose (INJECTAFER) 50 iron mg/mL injection; Inject 15 mLs (750 mg total) into the vein once. Injectafer 750 mg reconstituted by pharmacy, IVPB over 1 hour x's 1 dose.  Observe for 1 hour after this infusion.  [facility] out pt, Keyla Narcisa for 1 dose  Dispense: 15 mL; Refill: 0  -     Vitamin B12; Future; Expected date: 06/28/2021  -     Ferritin; Future; Expected date: 06/28/2021  -     CBC Auto Differential; Future; Expected date: 06/28/2021    Anemia of decreased vitamin B12 absorption  -     Vitamin B12; Future; Expected date: 06/28/2021  -     Ferritin; Future; Expected date: 06/28/2021  -     CBC Auto Differential; Future; Expected date: 06/28/2021      Follow up in about 6 months (around 6/28/2021) for check of blood status after therapy.

## 2021-03-08 ENCOUNTER — PATIENT MESSAGE (OUTPATIENT)
Dept: FAMILY MEDICINE | Facility: CLINIC | Age: 46
End: 2021-03-08

## 2021-03-23 ENCOUNTER — OFFICE VISIT (OUTPATIENT)
Dept: FAMILY MEDICINE | Facility: CLINIC | Age: 46
End: 2021-03-23
Payer: COMMERCIAL

## 2021-03-23 VITALS
SYSTOLIC BLOOD PRESSURE: 122 MMHG | BODY MASS INDEX: 45.36 KG/M2 | OXYGEN SATURATION: 97 % | TEMPERATURE: 98 F | WEIGHT: 256 LBS | HEIGHT: 63 IN | DIASTOLIC BLOOD PRESSURE: 64 MMHG | HEART RATE: 97 BPM

## 2021-03-23 DIAGNOSIS — I10 HYPERTENSION, UNSPECIFIED TYPE: Primary | ICD-10-CM

## 2021-03-23 DIAGNOSIS — R60.0 PERIPHERAL EDEMA: ICD-10-CM

## 2021-03-23 DIAGNOSIS — K21.9 GASTROESOPHAGEAL REFLUX DISEASE, UNSPECIFIED WHETHER ESOPHAGITIS PRESENT: ICD-10-CM

## 2021-03-23 PROCEDURE — 99214 PR OFFICE/OUTPT VISIT, EST, LEVL IV, 30-39 MIN: ICD-10-PCS | Mod: S$GLB,,, | Performed by: NURSE PRACTITIONER

## 2021-03-23 PROCEDURE — 3074F PR MOST RECENT SYSTOLIC BLOOD PRESSURE < 130 MM HG: ICD-10-PCS | Mod: S$GLB,,, | Performed by: NURSE PRACTITIONER

## 2021-03-23 PROCEDURE — 3074F SYST BP LT 130 MM HG: CPT | Mod: S$GLB,,, | Performed by: NURSE PRACTITIONER

## 2021-03-23 PROCEDURE — 3008F BODY MASS INDEX DOCD: CPT | Mod: S$GLB,,, | Performed by: NURSE PRACTITIONER

## 2021-03-23 PROCEDURE — 3078F PR MOST RECENT DIASTOLIC BLOOD PRESSURE < 80 MM HG: ICD-10-PCS | Mod: S$GLB,,, | Performed by: NURSE PRACTITIONER

## 2021-03-23 PROCEDURE — 3008F PR BODY MASS INDEX (BMI) DOCUMENTED: ICD-10-PCS | Mod: S$GLB,,, | Performed by: NURSE PRACTITIONER

## 2021-03-23 PROCEDURE — 99214 OFFICE O/P EST MOD 30 MIN: CPT | Mod: S$GLB,,, | Performed by: NURSE PRACTITIONER

## 2021-03-23 PROCEDURE — 3078F DIAST BP <80 MM HG: CPT | Mod: S$GLB,,, | Performed by: NURSE PRACTITIONER

## 2021-03-23 RX ORDER — ESOMEPRAZOLE MAGNESIUM 40 MG/1
CAPSULE, DELAYED RELEASE ORAL
Qty: 90 CAPSULE | Refills: 1 | Status: SHIPPED | OUTPATIENT
Start: 2021-03-23 | End: 2021-04-23 | Stop reason: SDUPTHER

## 2021-03-25 ENCOUNTER — TELEPHONE (OUTPATIENT)
Dept: GASTROENTEROLOGY | Facility: CLINIC | Age: 46
End: 2021-03-25

## 2021-04-09 ENCOUNTER — HOSPITAL ENCOUNTER (OUTPATIENT)
Dept: RADIOLOGY | Facility: HOSPITAL | Age: 46
Discharge: HOME | End: 2021-04-09
Attending: NURSE PRACTITIONER
Payer: COMMERCIAL

## 2021-04-09 DIAGNOSIS — R60.0 PERIPHERAL EDEMA: ICD-10-CM

## 2021-04-09 PROCEDURE — 93970 EXTREMITY STUDY: CPT | Mod: TC,PO

## 2021-04-23 ENCOUNTER — LAB VISIT (OUTPATIENT)
Dept: LAB | Facility: HOSPITAL | Age: 46
End: 2021-04-23
Attending: NURSE PRACTITIONER
Payer: COMMERCIAL

## 2021-04-23 ENCOUNTER — TELEPHONE (OUTPATIENT)
Dept: GASTROENTEROLOGY | Facility: CLINIC | Age: 46
End: 2021-04-23

## 2021-04-23 ENCOUNTER — OFFICE VISIT (OUTPATIENT)
Dept: GASTROENTEROLOGY | Facility: CLINIC | Age: 46
End: 2021-04-23
Payer: COMMERCIAL

## 2021-04-23 VITALS — BODY MASS INDEX: 45.58 KG/M2 | WEIGHT: 257.25 LBS | HEIGHT: 63 IN

## 2021-04-23 DIAGNOSIS — Z79.899 ENCOUNTER FOR MONITORING LONG-TERM PROTON PUMP INHIBITOR THERAPY: ICD-10-CM

## 2021-04-23 DIAGNOSIS — Z12.11 SCREENING FOR COLON CANCER: ICD-10-CM

## 2021-04-23 DIAGNOSIS — Z51.81 ENCOUNTER FOR MONITORING LONG-TERM PROTON PUMP INHIBITOR THERAPY: ICD-10-CM

## 2021-04-23 DIAGNOSIS — K21.9 GASTROESOPHAGEAL REFLUX DISEASE, UNSPECIFIED WHETHER ESOPHAGITIS PRESENT: Primary | ICD-10-CM

## 2021-04-23 LAB
ALBUMIN SERPL BCP-MCNC: 3.7 G/DL (ref 3.5–5.2)
ALP SERPL-CCNC: 65 U/L (ref 55–135)
ALT SERPL W/O P-5'-P-CCNC: 15 U/L (ref 10–44)
ANION GAP SERPL CALC-SCNC: 7 MMOL/L (ref 8–16)
AST SERPL-CCNC: 17 U/L (ref 10–40)
BILIRUB SERPL-MCNC: 0.8 MG/DL (ref 0.1–1)
BUN SERPL-MCNC: 10 MG/DL (ref 6–20)
CALCIUM SERPL-MCNC: 9.2 MG/DL (ref 8.7–10.5)
CHLORIDE SERPL-SCNC: 102 MMOL/L (ref 95–110)
CO2 SERPL-SCNC: 31 MMOL/L (ref 23–29)
CREAT SERPL-MCNC: 0.8 MG/DL (ref 0.5–1.4)
EST. GFR  (AFRICAN AMERICAN): >60 ML/MIN/1.73 M^2
EST. GFR  (NON AFRICAN AMERICAN): >60 ML/MIN/1.73 M^2
GLUCOSE SERPL-MCNC: 93 MG/DL (ref 70–110)
MAGNESIUM SERPL-MCNC: 1.7 MG/DL (ref 1.6–2.6)
POTASSIUM SERPL-SCNC: 3.6 MMOL/L (ref 3.5–5.1)
PROT SERPL-MCNC: 7.5 G/DL (ref 6–8.4)
SODIUM SERPL-SCNC: 140 MMOL/L (ref 136–145)
VIT B12 SERPL-MCNC: 463 PG/ML (ref 210–950)

## 2021-04-23 PROCEDURE — 3008F BODY MASS INDEX DOCD: CPT | Mod: CPTII,S$GLB,, | Performed by: NURSE PRACTITIONER

## 2021-04-23 PROCEDURE — 36415 COLL VENOUS BLD VENIPUNCTURE: CPT | Mod: PO | Performed by: NURSE PRACTITIONER

## 2021-04-23 PROCEDURE — 99204 PR OFFICE/OUTPT VISIT, NEW, LEVL IV, 45-59 MIN: ICD-10-PCS | Mod: S$GLB,,, | Performed by: NURSE PRACTITIONER

## 2021-04-23 PROCEDURE — 99999 PR PBB SHADOW E&M-EST. PATIENT-LVL IV: ICD-10-PCS | Mod: PBBFAC,,, | Performed by: NURSE PRACTITIONER

## 2021-04-23 PROCEDURE — 82607 VITAMIN B-12: CPT | Performed by: NURSE PRACTITIONER

## 2021-04-23 PROCEDURE — 80053 COMPREHEN METABOLIC PANEL: CPT | Performed by: NURSE PRACTITIONER

## 2021-04-23 PROCEDURE — 3008F PR BODY MASS INDEX (BMI) DOCUMENTED: ICD-10-PCS | Mod: CPTII,S$GLB,, | Performed by: NURSE PRACTITIONER

## 2021-04-23 PROCEDURE — 1126F PR PAIN SEVERITY QUANTIFIED, NO PAIN PRESENT: ICD-10-PCS | Mod: S$GLB,,, | Performed by: NURSE PRACTITIONER

## 2021-04-23 PROCEDURE — 83735 ASSAY OF MAGNESIUM: CPT | Performed by: NURSE PRACTITIONER

## 2021-04-23 PROCEDURE — 1126F AMNT PAIN NOTED NONE PRSNT: CPT | Mod: S$GLB,,, | Performed by: NURSE PRACTITIONER

## 2021-04-23 PROCEDURE — 99999 PR PBB SHADOW E&M-EST. PATIENT-LVL IV: CPT | Mod: PBBFAC,,, | Performed by: NURSE PRACTITIONER

## 2021-04-23 PROCEDURE — 99204 OFFICE O/P NEW MOD 45 MIN: CPT | Mod: S$GLB,,, | Performed by: NURSE PRACTITIONER

## 2021-04-23 RX ORDER — ESOMEPRAZOLE MAGNESIUM 40 MG/1
CAPSULE, DELAYED RELEASE ORAL
Qty: 90 CAPSULE | Refills: 3 | Status: SHIPPED | OUTPATIENT
Start: 2021-04-23 | End: 2022-07-18

## 2021-04-26 ENCOUNTER — TELEPHONE (OUTPATIENT)
Dept: GASTROENTEROLOGY | Facility: CLINIC | Age: 46
End: 2021-04-26

## 2021-05-03 ENCOUNTER — PATIENT MESSAGE (OUTPATIENT)
Dept: GASTROENTEROLOGY | Facility: CLINIC | Age: 46
End: 2021-05-03

## 2021-05-03 ENCOUNTER — TELEPHONE (OUTPATIENT)
Dept: GASTROENTEROLOGY | Facility: CLINIC | Age: 46
End: 2021-05-03

## 2021-05-03 DIAGNOSIS — Z12.11 SCREENING FOR COLON CANCER: Primary | ICD-10-CM

## 2021-06-01 ENCOUNTER — LAB VISIT (OUTPATIENT)
Dept: PRIMARY CARE CLINIC | Facility: CLINIC | Age: 46
End: 2021-06-01
Payer: COMMERCIAL

## 2021-06-01 DIAGNOSIS — Z01.818 PRE-OP TESTING: ICD-10-CM

## 2021-06-01 PROCEDURE — U0005 INFEC AGEN DETEC AMPLI PROBE: HCPCS | Performed by: INTERNAL MEDICINE

## 2021-06-01 PROCEDURE — U0003 INFECTIOUS AGENT DETECTION BY NUCLEIC ACID (DNA OR RNA); SEVERE ACUTE RESPIRATORY SYNDROME CORONAVIRUS 2 (SARS-COV-2) (CORONAVIRUS DISEASE [COVID-19]), AMPLIFIED PROBE TECHNIQUE, MAKING USE OF HIGH THROUGHPUT TECHNOLOGIES AS DESCRIBED BY CMS-2020-01-R: HCPCS | Performed by: INTERNAL MEDICINE

## 2021-06-02 LAB — SARS-COV-2 RNA RESP QL NAA+PROBE: NOT DETECTED

## 2021-06-04 ENCOUNTER — HOSPITAL ENCOUNTER (OUTPATIENT)
Facility: HOSPITAL | Age: 46
Discharge: HOME | End: 2021-06-04
Attending: INTERNAL MEDICINE | Admitting: INTERNAL MEDICINE
Payer: COMMERCIAL

## 2021-06-04 ENCOUNTER — ANESTHESIA EVENT (OUTPATIENT)
Dept: ENDOSCOPY | Facility: HOSPITAL | Age: 46
End: 2021-06-04
Payer: COMMERCIAL

## 2021-06-04 ENCOUNTER — ANESTHESIA (OUTPATIENT)
Dept: ENDOSCOPY | Facility: HOSPITAL | Age: 46
End: 2021-06-04
Payer: COMMERCIAL

## 2021-06-04 VITALS
SYSTOLIC BLOOD PRESSURE: 133 MMHG | BODY MASS INDEX: 46.95 KG/M2 | RESPIRATION RATE: 17 BRPM | DIASTOLIC BLOOD PRESSURE: 80 MMHG | OXYGEN SATURATION: 99 % | HEART RATE: 66 BPM | TEMPERATURE: 99 F | WEIGHT: 265 LBS | HEIGHT: 63 IN

## 2021-06-04 DIAGNOSIS — Z12.11 COLON CANCER SCREENING: ICD-10-CM

## 2021-06-04 DIAGNOSIS — K57.30 DIVERTICULOSIS OF LARGE INTESTINE WITHOUT HEMORRHAGE: Primary | ICD-10-CM

## 2021-06-04 LAB
B-HCG UR QL: NEGATIVE
CTP QC/QA: YES

## 2021-06-04 PROCEDURE — 25000003 PHARM REV CODE 250: Performed by: NURSE ANESTHETIST, CERTIFIED REGISTERED

## 2021-06-04 PROCEDURE — 63600175 PHARM REV CODE 636 W HCPCS: Performed by: NURSE ANESTHETIST, CERTIFIED REGISTERED

## 2021-06-04 PROCEDURE — D9220A PRA ANESTHESIA: Mod: CRNA,,, | Performed by: NURSE ANESTHETIST, CERTIFIED REGISTERED

## 2021-06-04 PROCEDURE — 37000009 HC ANESTHESIA EA ADD 15 MINS: Performed by: INTERNAL MEDICINE

## 2021-06-04 PROCEDURE — D9220A PRA ANESTHESIA: ICD-10-PCS | Mod: CRNA,,, | Performed by: NURSE ANESTHETIST, CERTIFIED REGISTERED

## 2021-06-04 PROCEDURE — G0121 COLON CA SCRN NOT HI RSK IND: ICD-10-PCS | Mod: ,,, | Performed by: INTERNAL MEDICINE

## 2021-06-04 PROCEDURE — D9220A PRA ANESTHESIA: ICD-10-PCS | Mod: ANES,,, | Performed by: ANESTHESIOLOGY

## 2021-06-04 PROCEDURE — G0121 COLON CA SCRN NOT HI RSK IND: HCPCS | Performed by: INTERNAL MEDICINE

## 2021-06-04 PROCEDURE — 25000003 PHARM REV CODE 250: Performed by: INTERNAL MEDICINE

## 2021-06-04 PROCEDURE — 37000008 HC ANESTHESIA 1ST 15 MINUTES: Performed by: INTERNAL MEDICINE

## 2021-06-04 PROCEDURE — 81025 URINE PREGNANCY TEST: CPT | Performed by: INTERNAL MEDICINE

## 2021-06-04 PROCEDURE — G0121 COLON CA SCRN NOT HI RSK IND: HCPCS | Mod: ,,, | Performed by: INTERNAL MEDICINE

## 2021-06-04 PROCEDURE — D9220A PRA ANESTHESIA: Mod: ANES,,, | Performed by: ANESTHESIOLOGY

## 2021-06-04 RX ORDER — PROPOFOL 10 MG/ML
VIAL (ML) INTRAVENOUS
Status: DISCONTINUED | OUTPATIENT
Start: 2021-06-04 | End: 2021-06-04

## 2021-06-04 RX ORDER — LIDOCAINE HYDROCHLORIDE 20 MG/ML
INJECTION INTRAVENOUS
Status: DISCONTINUED | OUTPATIENT
Start: 2021-06-04 | End: 2021-06-04

## 2021-06-04 RX ORDER — SODIUM CHLORIDE 9 MG/ML
INJECTION, SOLUTION INTRAVENOUS CONTINUOUS
Status: DISCONTINUED | OUTPATIENT
Start: 2021-06-04 | End: 2021-06-04 | Stop reason: HOSPADM

## 2021-06-04 RX ADMIN — LIDOCAINE HYDROCHLORIDE 100 MG: 20 INJECTION, SOLUTION INTRAVENOUS at 08:06

## 2021-06-04 RX ADMIN — PROPOFOL 50 MG: 10 INJECTION, EMULSION INTRAVENOUS at 08:06

## 2021-06-04 RX ADMIN — SODIUM CHLORIDE: 0.9 INJECTION, SOLUTION INTRAVENOUS at 08:06

## 2021-06-16 ENCOUNTER — TELEPHONE (OUTPATIENT)
Dept: FAMILY MEDICINE | Facility: CLINIC | Age: 46
End: 2021-06-16

## 2021-09-21 ENCOUNTER — TELEPHONE (OUTPATIENT)
Dept: FAMILY MEDICINE | Facility: CLINIC | Age: 46
End: 2021-09-21

## 2021-09-24 ENCOUNTER — LAB VISIT (OUTPATIENT)
Dept: LAB | Facility: HOSPITAL | Age: 46
End: 2021-09-24
Attending: NURSE PRACTITIONER
Payer: COMMERCIAL

## 2021-09-24 DIAGNOSIS — I10 HYPERTENSION, UNSPECIFIED TYPE: ICD-10-CM

## 2021-09-24 DIAGNOSIS — R60.0 PERIPHERAL EDEMA: ICD-10-CM

## 2021-09-24 LAB
ALBUMIN SERPL BCP-MCNC: 4.1 G/DL (ref 3.5–5.2)
ALP SERPL-CCNC: 56 U/L (ref 55–135)
ALT SERPL W/O P-5'-P-CCNC: 16 U/L (ref 10–44)
ANION GAP SERPL CALC-SCNC: 13 MMOL/L (ref 8–16)
AST SERPL-CCNC: 16 U/L (ref 10–40)
BILIRUB SERPL-MCNC: 1.3 MG/DL (ref 0.1–1)
BUN SERPL-MCNC: 12 MG/DL (ref 6–20)
CALCIUM SERPL-MCNC: 9.5 MG/DL (ref 8.7–10.5)
CHLORIDE SERPL-SCNC: 103 MMOL/L (ref 95–110)
CO2 SERPL-SCNC: 26 MMOL/L (ref 23–29)
CREAT SERPL-MCNC: 0.7 MG/DL (ref 0.5–1.4)
EST. GFR  (AFRICAN AMERICAN): >60 ML/MIN/1.73 M^2
EST. GFR  (NON AFRICAN AMERICAN): >60 ML/MIN/1.73 M^2
GLUCOSE SERPL-MCNC: 77 MG/DL (ref 70–110)
POTASSIUM SERPL-SCNC: 3.7 MMOL/L (ref 3.5–5.1)
PROT SERPL-MCNC: 7.9 G/DL (ref 6–8.4)
SODIUM SERPL-SCNC: 142 MMOL/L (ref 136–145)

## 2021-09-24 PROCEDURE — 80053 COMPREHEN METABOLIC PANEL: CPT | Performed by: NURSE PRACTITIONER

## 2021-09-24 PROCEDURE — 36415 COLL VENOUS BLD VENIPUNCTURE: CPT | Performed by: NURSE PRACTITIONER

## 2021-09-29 ENCOUNTER — OFFICE VISIT (OUTPATIENT)
Dept: FAMILY MEDICINE | Facility: CLINIC | Age: 46
End: 2021-09-29
Payer: COMMERCIAL

## 2021-09-29 VITALS
DIASTOLIC BLOOD PRESSURE: 84 MMHG | HEIGHT: 63 IN | WEIGHT: 261.63 LBS | SYSTOLIC BLOOD PRESSURE: 122 MMHG | HEART RATE: 56 BPM | TEMPERATURE: 99 F | OXYGEN SATURATION: 99 % | BODY MASS INDEX: 46.36 KG/M2

## 2021-09-29 DIAGNOSIS — K21.9 GASTROESOPHAGEAL REFLUX DISEASE, UNSPECIFIED WHETHER ESOPHAGITIS PRESENT: ICD-10-CM

## 2021-09-29 DIAGNOSIS — I10 HYPERTENSION, UNSPECIFIED TYPE: ICD-10-CM

## 2021-09-29 DIAGNOSIS — Z11.59 ENCOUNTER FOR HEPATITIS C SCREENING TEST FOR LOW RISK PATIENT: ICD-10-CM

## 2021-09-29 DIAGNOSIS — R60.0 PERIPHERAL EDEMA: ICD-10-CM

## 2021-09-29 DIAGNOSIS — Z00.01 ENCOUNTER FOR ROUTINE ADULT HEALTH EXAMINATION WITH ABNORMAL FINDINGS: Primary | ICD-10-CM

## 2021-09-29 DIAGNOSIS — Z12.4 SCREENING FOR MALIGNANT NEOPLASM OF CERVIX: ICD-10-CM

## 2021-09-29 DIAGNOSIS — Z12.31 ENCOUNTER FOR SCREENING MAMMOGRAM FOR BREAST CANCER: ICD-10-CM

## 2021-09-29 PROCEDURE — 3074F SYST BP LT 130 MM HG: CPT | Mod: S$GLB,,, | Performed by: NURSE PRACTITIONER

## 2021-09-29 PROCEDURE — 3008F PR BODY MASS INDEX (BMI) DOCUMENTED: ICD-10-PCS | Mod: S$GLB,,, | Performed by: NURSE PRACTITIONER

## 2021-09-29 PROCEDURE — 1160F RVW MEDS BY RX/DR IN RCRD: CPT | Mod: S$GLB,,, | Performed by: NURSE PRACTITIONER

## 2021-09-29 PROCEDURE — 3008F BODY MASS INDEX DOCD: CPT | Mod: S$GLB,,, | Performed by: NURSE PRACTITIONER

## 2021-09-29 PROCEDURE — 4010F ACE/ARB THERAPY RXD/TAKEN: CPT | Mod: S$GLB,,, | Performed by: NURSE PRACTITIONER

## 2021-09-29 PROCEDURE — 3079F PR MOST RECENT DIASTOLIC BLOOD PRESSURE 80-89 MM HG: ICD-10-PCS | Mod: S$GLB,,, | Performed by: NURSE PRACTITIONER

## 2021-09-29 PROCEDURE — 99396 PREV VISIT EST AGE 40-64: CPT | Mod: S$GLB,,, | Performed by: NURSE PRACTITIONER

## 2021-09-29 PROCEDURE — 3074F PR MOST RECENT SYSTOLIC BLOOD PRESSURE < 130 MM HG: ICD-10-PCS | Mod: S$GLB,,, | Performed by: NURSE PRACTITIONER

## 2021-09-29 PROCEDURE — 1160F PR REVIEW ALL MEDS BY PRESCRIBER/CLIN PHARMACIST DOCUMENTED: ICD-10-PCS | Mod: S$GLB,,, | Performed by: NURSE PRACTITIONER

## 2021-09-29 PROCEDURE — 4010F PR ACE/ARB THEARPY RXD/TAKEN: ICD-10-PCS | Mod: S$GLB,,, | Performed by: NURSE PRACTITIONER

## 2021-09-29 PROCEDURE — 99396 PR PREVENTIVE VISIT,EST,40-64: ICD-10-PCS | Mod: S$GLB,,, | Performed by: NURSE PRACTITIONER

## 2021-09-29 PROCEDURE — 3079F DIAST BP 80-89 MM HG: CPT | Mod: S$GLB,,, | Performed by: NURSE PRACTITIONER

## 2021-09-29 RX ORDER — NEOMYCIN SULFATE, POLYMYXIN B SULFATE AND DEXAMETHASONE 3.5; 10000; 1 MG/ML; [USP'U]/ML; MG/ML
SUSPENSION/ DROPS OPHTHALMIC
COMMUNITY
Start: 2021-09-10 | End: 2022-02-01

## 2021-09-29 RX ORDER — OLMESARTAN MEDOXOMIL 40 MG/1
40 TABLET ORAL DAILY
Qty: 90 TABLET | Refills: 3 | Status: SHIPPED | OUTPATIENT
Start: 2021-09-29 | End: 2022-01-16

## 2021-09-29 RX ORDER — DOXYCYCLINE 100 MG/1
1 TABLET ORAL 2 TIMES DAILY
COMMUNITY
Start: 2021-09-10 | End: 2022-02-01

## 2021-10-11 DIAGNOSIS — L50.8 CHRONIC URTICARIA: ICD-10-CM

## 2021-10-12 RX ORDER — CETIRIZINE HYDROCHLORIDE 10 MG/1
10 TABLET ORAL 2 TIMES DAILY
Qty: 180 TABLET | Refills: 4 | OUTPATIENT
Start: 2021-10-12 | End: 2022-10-12

## 2021-11-18 DIAGNOSIS — I10 GOOD HYPERTENSION CONTROL: ICD-10-CM

## 2021-11-19 RX ORDER — PROPRANOLOL HYDROCHLORIDE 160 MG/1
CAPSULE, EXTENDED RELEASE ORAL
Qty: 90 CAPSULE | Refills: 1 | Status: SHIPPED | OUTPATIENT
Start: 2021-11-19 | End: 2022-01-16

## 2021-12-23 ENCOUNTER — OFFICE VISIT (OUTPATIENT)
Dept: HEMATOLOGY/ONCOLOGY | Facility: CLINIC | Age: 46
End: 2021-12-23
Payer: COMMERCIAL

## 2021-12-23 VITALS
SYSTOLIC BLOOD PRESSURE: 127 MMHG | RESPIRATION RATE: 18 BRPM | WEIGHT: 256 LBS | HEART RATE: 73 BPM | DIASTOLIC BLOOD PRESSURE: 81 MMHG | BODY MASS INDEX: 45.36 KG/M2 | HEIGHT: 63 IN

## 2021-12-23 DIAGNOSIS — D51.8 ANEMIA OF DECREASED VITAMIN B12 ABSORPTION: ICD-10-CM

## 2021-12-23 DIAGNOSIS — E53.1 PYRIDOXINE DEFICIENCY: ICD-10-CM

## 2021-12-23 DIAGNOSIS — K95.89 IRON DEFICIENCY ANEMIA FOLLOWING BARIATRIC SURGERY: Primary | ICD-10-CM

## 2021-12-23 DIAGNOSIS — D50.8 IRON DEFICIENCY ANEMIA FOLLOWING BARIATRIC SURGERY: Primary | ICD-10-CM

## 2021-12-23 PROCEDURE — 99214 OFFICE O/P EST MOD 30 MIN: CPT | Mod: S$GLB,,, | Performed by: INTERNAL MEDICINE

## 2021-12-23 PROCEDURE — 3079F PR MOST RECENT DIASTOLIC BLOOD PRESSURE 80-89 MM HG: ICD-10-PCS | Mod: S$GLB,,, | Performed by: INTERNAL MEDICINE

## 2021-12-23 PROCEDURE — 3008F BODY MASS INDEX DOCD: CPT | Mod: S$GLB,,, | Performed by: INTERNAL MEDICINE

## 2021-12-23 PROCEDURE — 99214 PR OFFICE/OUTPT VISIT, EST, LEVL IV, 30-39 MIN: ICD-10-PCS | Mod: S$GLB,,, | Performed by: INTERNAL MEDICINE

## 2021-12-23 PROCEDURE — 3008F PR BODY MASS INDEX (BMI) DOCUMENTED: ICD-10-PCS | Mod: S$GLB,,, | Performed by: INTERNAL MEDICINE

## 2021-12-23 PROCEDURE — 4010F PR ACE/ARB THEARPY RXD/TAKEN: ICD-10-PCS | Mod: S$GLB,,, | Performed by: INTERNAL MEDICINE

## 2021-12-23 PROCEDURE — 3074F SYST BP LT 130 MM HG: CPT | Mod: S$GLB,,, | Performed by: INTERNAL MEDICINE

## 2021-12-23 PROCEDURE — 3074F PR MOST RECENT SYSTOLIC BLOOD PRESSURE < 130 MM HG: ICD-10-PCS | Mod: S$GLB,,, | Performed by: INTERNAL MEDICINE

## 2021-12-23 PROCEDURE — 3079F DIAST BP 80-89 MM HG: CPT | Mod: S$GLB,,, | Performed by: INTERNAL MEDICINE

## 2021-12-23 PROCEDURE — 4010F ACE/ARB THERAPY RXD/TAKEN: CPT | Mod: S$GLB,,, | Performed by: INTERNAL MEDICINE

## 2021-12-30 ENCOUNTER — HOSPITAL ENCOUNTER (OUTPATIENT)
Dept: RADIOLOGY | Facility: HOSPITAL | Age: 46
Discharge: HOME | End: 2021-12-30
Attending: NURSE PRACTITIONER
Payer: COMMERCIAL

## 2021-12-30 DIAGNOSIS — Z12.31 ENCOUNTER FOR SCREENING MAMMOGRAM FOR BREAST CANCER: ICD-10-CM

## 2021-12-30 PROCEDURE — 77067 SCR MAMMO BI INCL CAD: CPT | Mod: TC,PO

## 2022-01-05 LAB
BASOPHILS # BLD AUTO: 50 CELLS/UL (ref 0–200)
BASOPHILS NFR BLD AUTO: 0.9 %
EOSINOPHIL # BLD AUTO: 132 CELLS/UL (ref 15–500)
EOSINOPHIL NFR BLD AUTO: 2.4 %
ERYTHROCYTE [DISTWIDTH] IN BLOOD BY AUTOMATED COUNT: 12.3 % (ref 11–15)
FERRITIN SERPL-MCNC: 115 NG/ML (ref 16–232)
HCT VFR BLD AUTO: 37 % (ref 35–45)
HGB BLD-MCNC: 12.2 G/DL (ref 11.7–15.5)
LYMPHOCYTES # BLD AUTO: 2250 CELLS/UL (ref 850–3900)
LYMPHOCYTES NFR BLD AUTO: 40.9 %
MCH RBC QN AUTO: 29.2 PG (ref 27–33)
MCHC RBC AUTO-ENTMCNC: 33 G/DL (ref 32–36)
MCV RBC AUTO: 88.5 FL (ref 80–100)
MONOCYTES # BLD AUTO: 556 CELLS/UL (ref 200–950)
MONOCYTES NFR BLD AUTO: 10.1 %
NEUTROPHILS # BLD AUTO: 2514 CELLS/UL (ref 1500–7800)
NEUTROPHILS NFR BLD AUTO: 45.7 %
PLATELET # BLD AUTO: 250 THOUSAND/UL (ref 140–400)
PMV BLD REES-ECKER: 10.6 FL (ref 7.5–12.5)
RBC # BLD AUTO: 4.18 MILLION/UL (ref 3.8–5.1)
VIT B12 SERPL-MCNC: 330 PG/ML (ref 200–1100)
VIT B6 SERPL-MCNC: 4.8 NG/ML (ref 2.1–21.7)
WBC # BLD AUTO: 5.5 THOUSAND/UL (ref 3.8–10.8)

## 2022-01-07 ENCOUNTER — HOSPITAL ENCOUNTER (OUTPATIENT)
Dept: RADIOLOGY | Facility: HOSPITAL | Age: 47
Discharge: HOME | End: 2022-01-07
Attending: NURSE PRACTITIONER
Payer: COMMERCIAL

## 2022-01-07 DIAGNOSIS — R92.2 INCONCLUSIVE MAMMOGRAM: ICD-10-CM

## 2022-01-07 PROCEDURE — 76642 ULTRASOUND BREAST LIMITED: CPT | Mod: TC,PO,LT

## 2022-01-14 DIAGNOSIS — L50.8 CHRONIC URTICARIA: ICD-10-CM

## 2022-01-14 RX ORDER — CETIRIZINE HYDROCHLORIDE 10 MG/1
TABLET ORAL
Qty: 180 TABLET | Refills: 4 | OUTPATIENT
Start: 2022-01-14

## 2022-01-19 ENCOUNTER — PATIENT MESSAGE (OUTPATIENT)
Dept: ADMINISTRATIVE | Facility: OTHER | Age: 47
End: 2022-01-19
Payer: COMMERCIAL

## 2022-02-01 ENCOUNTER — OFFICE VISIT (OUTPATIENT)
Dept: ALLERGY | Facility: CLINIC | Age: 47
End: 2022-02-01
Payer: COMMERCIAL

## 2022-02-01 VITALS
HEART RATE: 80 BPM | DIASTOLIC BLOOD PRESSURE: 84 MMHG | BODY MASS INDEX: 46.07 KG/M2 | HEIGHT: 63 IN | TEMPERATURE: 97 F | OXYGEN SATURATION: 98 % | WEIGHT: 260 LBS | SYSTOLIC BLOOD PRESSURE: 117 MMHG

## 2022-02-01 DIAGNOSIS — L50.8 CHRONIC URTICARIA: ICD-10-CM

## 2022-02-01 PROCEDURE — 4010F PR ACE/ARB THEARPY RXD/TAKEN: ICD-10-PCS | Mod: S$GLB,,, | Performed by: ALLERGY & IMMUNOLOGY

## 2022-02-01 PROCEDURE — 99213 OFFICE O/P EST LOW 20 MIN: CPT | Mod: S$GLB,,, | Performed by: ALLERGY & IMMUNOLOGY

## 2022-02-01 PROCEDURE — 1160F PR REVIEW ALL MEDS BY PRESCRIBER/CLIN PHARMACIST DOCUMENTED: ICD-10-PCS | Mod: S$GLB,,, | Performed by: ALLERGY & IMMUNOLOGY

## 2022-02-01 PROCEDURE — 3074F SYST BP LT 130 MM HG: CPT | Mod: S$GLB,,, | Performed by: ALLERGY & IMMUNOLOGY

## 2022-02-01 PROCEDURE — 1160F RVW MEDS BY RX/DR IN RCRD: CPT | Mod: S$GLB,,, | Performed by: ALLERGY & IMMUNOLOGY

## 2022-02-01 PROCEDURE — 3074F PR MOST RECENT SYSTOLIC BLOOD PRESSURE < 130 MM HG: ICD-10-PCS | Mod: S$GLB,,, | Performed by: ALLERGY & IMMUNOLOGY

## 2022-02-01 PROCEDURE — 3079F DIAST BP 80-89 MM HG: CPT | Mod: S$GLB,,, | Performed by: ALLERGY & IMMUNOLOGY

## 2022-02-01 PROCEDURE — 3079F PR MOST RECENT DIASTOLIC BLOOD PRESSURE 80-89 MM HG: ICD-10-PCS | Mod: S$GLB,,, | Performed by: ALLERGY & IMMUNOLOGY

## 2022-02-01 PROCEDURE — 99213 PR OFFICE/OUTPT VISIT, EST, LEVL III, 20-29 MIN: ICD-10-PCS | Mod: S$GLB,,, | Performed by: ALLERGY & IMMUNOLOGY

## 2022-02-01 PROCEDURE — 3008F PR BODY MASS INDEX (BMI) DOCUMENTED: ICD-10-PCS | Mod: S$GLB,,, | Performed by: ALLERGY & IMMUNOLOGY

## 2022-02-01 PROCEDURE — 3008F BODY MASS INDEX DOCD: CPT | Mod: S$GLB,,, | Performed by: ALLERGY & IMMUNOLOGY

## 2022-02-01 PROCEDURE — 4010F ACE/ARB THERAPY RXD/TAKEN: CPT | Mod: S$GLB,,, | Performed by: ALLERGY & IMMUNOLOGY

## 2022-02-01 RX ORDER — CETIRIZINE HYDROCHLORIDE 10 MG/1
10 TABLET ORAL 2 TIMES DAILY
Qty: 180 TABLET | Refills: 4 | Status: SHIPPED | OUTPATIENT
Start: 2022-02-01 | End: 2023-01-30 | Stop reason: SDUPTHER

## 2022-02-01 NOTE — PROGRESS NOTES
Subjective:       Patient ID: Kitty Jennings is a 46 y.o. female.    Chief Complaint: Urticaria (Annual follow up, out of zyrtec x 1 week, taking bid has helped, only one episode of itching. )    Pt presents for urticaria.     9/2020 was her last visit,   At that time, zyrtec BID started, considered montelukast, considered xolair    Since that time,     Pt has been well as long as on medications.       At the last visit, she was started on doxepin, zyrtec bid, and zantac bid.   We are considering xolair if her breast mass is not cancerous. No calcified material. Does not seem to be cancerous at this time.     Pt has been doing well for the past year.  Pt notes off medications she has out breaks, took about a week off medications.   No angioedema since a year ago.   Zyrtec daily has controlled her symptoms.   She has some hoarseness.   Requires 1 zyrtec BID to be controlled. Doesn't require pepcid for control.     Condition: improved   Pt states they may have resolved in May.   Started in her teenage years  Associated facial swelling with hives, last week facial swelling.   Pt does mention a temporal association with eating shrimp of lip swelling and generalized urticaria. This was about 2-3 hours post ingestion.   Location generalized  Frequency comes and goes- currently daily   Typically will go away after a few months  Recently started working for [employer] x 8 years.   Most recently, for the past 8 years, more frequent.   She would feel a deep itch.   Tx: 2 zyrtec q am.      She did have one incident and her lips started swelling in July.   Concern that possible non fresh fish made her lip swell.   Had fish since that time without incident.     Saw another allergist: Dr. Waldron 5 years ago.   Prior had allergy testing   Serum and skin test was negative.         Review of Systems      General: neg unexpected weight changes, fevers, chills, night sweats, malaise  HEENT: see hpi, Neg eye pain, vision changes, ear  "drainage, nose bleeds, throat tightness, sores in the mouth  CV: Neg chest pain, palpitations, swelling  Resp: see hpi, neg shortness of breath, hemoptysis, cough  GI: see hpi, neg dysphagia, night abdominal pain, reflux, chronic diarrhea, chronic constipation  Derm: See Hpi,  neg flushing  Mu/sk: Neg joint pain, joint swelling   Psych: Neg anxiety  neuro: neg chronic headaches, muscle weakness  Endo: neg heat/cold intolerance, chronic fatigue    Objective:     Vitals:    02/01/22 1459   BP: 117/84   Pulse: 80   Temp: 97 °F (36.1 °C)   SpO2: 98%   Weight: 117.9 kg (260 lb)   Height: 5' 3" (1.6 m)         Physical Exam      General: no acute distress, well developed well nourished     Skin: neg lesions   Lymph: neg supraclavicular, axillary     Assessment:       1. Chronic urticaria        Plan:       Chronic urticaria  -     cetirizine (ZYRTEC) 10 MG tablet; Take 1 tablet (10 mg total) by mouth 2 (two) times daily.  Dispense: 180 tablet; Refill: 4    explained urticaria and action plan and education.   currently controlled on 20 mg zyrtec qday.   continue high dose antihistamines  Consider montelukast  Consider xolair       Follow up in 6-12 months.   .      Lori Chavez M.D.  Allergy/Immunology  [facility]   [phone] phone  [phone] fax            "

## 2022-02-01 NOTE — PATIENT INSTRUCTIONS
Continue high dose zyrtec.     If itching is not controlled by 2 pills daily.     Then increase to twice per day.     For times you are better,     Try 1 zyrtec daily for a few weeks, then try to stop.

## 2022-03-23 ENCOUNTER — TELEPHONE (OUTPATIENT)
Dept: FAMILY MEDICINE | Facility: CLINIC | Age: 47
End: 2022-03-23
Payer: COMMERCIAL

## 2022-03-24 ENCOUNTER — PATIENT MESSAGE (OUTPATIENT)
Dept: FAMILY MEDICINE | Facility: CLINIC | Age: 47
End: 2022-03-24
Payer: COMMERCIAL

## 2022-03-25 ENCOUNTER — LAB VISIT (OUTPATIENT)
Dept: LAB | Facility: HOSPITAL | Age: 47
End: 2022-03-25
Attending: NURSE PRACTITIONER
Payer: COMMERCIAL

## 2022-03-25 DIAGNOSIS — I10 HYPERTENSION, UNSPECIFIED TYPE: ICD-10-CM

## 2022-03-25 DIAGNOSIS — Z11.59 ENCOUNTER FOR HEPATITIS C SCREENING TEST FOR LOW RISK PATIENT: ICD-10-CM

## 2022-03-25 LAB
25(OH)D3+25(OH)D2 SERPL-MCNC: 10 NG/ML (ref 30–96)
ALBUMIN SERPL BCP-MCNC: 3.9 G/DL (ref 3.5–5.2)
ALP SERPL-CCNC: 52 U/L (ref 55–135)
ALT SERPL W/O P-5'-P-CCNC: 18 U/L (ref 10–44)
ANION GAP SERPL CALC-SCNC: 13 MMOL/L (ref 8–16)
AST SERPL-CCNC: 17 U/L (ref 10–40)
BASOPHILS # BLD AUTO: 0.05 K/UL (ref 0–0.2)
BASOPHILS NFR BLD: 0.8 % (ref 0–1.9)
BILIRUB SERPL-MCNC: 0.7 MG/DL (ref 0.1–1)
BILIRUB UR QL STRIP: NEGATIVE
BUN SERPL-MCNC: 12 MG/DL (ref 6–20)
CALCIUM SERPL-MCNC: 9.2 MG/DL (ref 8.7–10.5)
CHLORIDE SERPL-SCNC: 102 MMOL/L (ref 95–110)
CHOLEST SERPL-MCNC: 191 MG/DL (ref 120–199)
CHOLEST/HDLC SERPL: 3.7 {RATIO} (ref 2–5)
CLARITY UR: ABNORMAL
CO2 SERPL-SCNC: 24 MMOL/L (ref 23–29)
COLOR UR: YELLOW
CREAT SERPL-MCNC: 0.7 MG/DL (ref 0.5–1.4)
DIFFERENTIAL METHOD: ABNORMAL
EOSINOPHIL # BLD AUTO: 0.2 K/UL (ref 0–0.5)
EOSINOPHIL NFR BLD: 2.4 % (ref 0–8)
ERYTHROCYTE [DISTWIDTH] IN BLOOD BY AUTOMATED COUNT: 12.4 % (ref 11.5–14.5)
EST. GFR  (AFRICAN AMERICAN): >60 ML/MIN/1.73 M^2
EST. GFR  (NON AFRICAN AMERICAN): >60 ML/MIN/1.73 M^2
GLUCOSE SERPL-MCNC: 100 MG/DL (ref 70–110)
GLUCOSE UR QL STRIP: NEGATIVE
HCT VFR BLD AUTO: 38.9 % (ref 37–48.5)
HDLC SERPL-MCNC: 52 MG/DL (ref 40–75)
HDLC SERPL: 27.2 % (ref 20–50)
HGB BLD-MCNC: 12.3 G/DL (ref 12–16)
HGB UR QL STRIP: NEGATIVE
IMM GRANULOCYTES # BLD AUTO: 0.01 K/UL (ref 0–0.04)
IMM GRANULOCYTES NFR BLD AUTO: 0.2 % (ref 0–0.5)
KETONES UR QL STRIP: NEGATIVE
LDLC SERPL CALC-MCNC: 113 MG/DL (ref 63–159)
LEUKOCYTE ESTERASE UR QL STRIP: NEGATIVE
LYMPHOCYTES # BLD AUTO: 2.5 K/UL (ref 1–4.8)
LYMPHOCYTES NFR BLD: 39.5 % (ref 18–48)
MCH RBC QN AUTO: 28.9 PG (ref 27–31)
MCHC RBC AUTO-ENTMCNC: 31.6 G/DL (ref 32–36)
MCV RBC AUTO: 91 FL (ref 82–98)
MONOCYTES # BLD AUTO: 0.5 K/UL (ref 0.3–1)
MONOCYTES NFR BLD: 8.5 % (ref 4–15)
NEUTROPHILS # BLD AUTO: 3.1 K/UL (ref 1.8–7.7)
NEUTROPHILS NFR BLD: 48.6 % (ref 38–73)
NITRITE UR QL STRIP: NEGATIVE
NONHDLC SERPL-MCNC: 139 MG/DL
NRBC BLD-RTO: 0 /100 WBC
PH UR STRIP: 8 [PH] (ref 5–8)
PLATELET # BLD AUTO: 260 K/UL (ref 150–450)
PMV BLD AUTO: 10.7 FL (ref 9.2–12.9)
POTASSIUM SERPL-SCNC: 3.6 MMOL/L (ref 3.5–5.1)
PROT SERPL-MCNC: 7.5 G/DL (ref 6–8.4)
PROT UR QL STRIP: ABNORMAL
RBC # BLD AUTO: 4.26 M/UL (ref 4–5.4)
SODIUM SERPL-SCNC: 139 MMOL/L (ref 136–145)
SP GR UR STRIP: 1.02 (ref 1–1.03)
TRIGL SERPL-MCNC: 130 MG/DL (ref 30–150)
TSH SERPL DL<=0.005 MIU/L-ACNC: 1.18 UIU/ML (ref 0.34–5.6)
URN SPEC COLLECT METH UR: ABNORMAL
UROBILINOGEN UR STRIP-ACNC: NEGATIVE EU/DL
WBC # BLD AUTO: 6.26 K/UL (ref 3.9–12.7)

## 2022-03-25 PROCEDURE — 80061 LIPID PANEL: CPT | Performed by: NURSE PRACTITIONER

## 2022-03-25 PROCEDURE — 82306 VITAMIN D 25 HYDROXY: CPT | Performed by: NURSE PRACTITIONER

## 2022-03-25 PROCEDURE — 36415 COLL VENOUS BLD VENIPUNCTURE: CPT | Performed by: NURSE PRACTITIONER

## 2022-03-25 PROCEDURE — 84443 ASSAY THYROID STIM HORMONE: CPT | Performed by: NURSE PRACTITIONER

## 2022-03-25 PROCEDURE — 85025 COMPLETE CBC W/AUTO DIFF WBC: CPT | Performed by: NURSE PRACTITIONER

## 2022-03-25 PROCEDURE — 86803 HEPATITIS C AB TEST: CPT | Performed by: NURSE PRACTITIONER

## 2022-03-25 PROCEDURE — 81003 URINALYSIS AUTO W/O SCOPE: CPT | Performed by: NURSE PRACTITIONER

## 2022-03-25 PROCEDURE — 80053 COMPREHEN METABOLIC PANEL: CPT | Performed by: NURSE PRACTITIONER

## 2022-03-26 LAB — HCV AB S/CO SERPL IA: 0.4 S/CO RATIO (ref 0–0.9)

## 2022-03-28 ENCOUNTER — PATIENT MESSAGE (OUTPATIENT)
Dept: FAMILY MEDICINE | Facility: CLINIC | Age: 47
End: 2022-03-28
Payer: COMMERCIAL

## 2022-03-28 DIAGNOSIS — I10 HYPERTENSION, UNSPECIFIED TYPE: ICD-10-CM

## 2022-03-28 NOTE — PROGRESS NOTES
Vitamin D is low, cholesterol is improving otherwise labs are ok; follow up as planned to discuss further treatment

## 2022-03-29 RX ORDER — HYDROCHLOROTHIAZIDE 25 MG/1
25 TABLET ORAL DAILY
Qty: 30 TABLET | Refills: 0 | Status: SHIPPED | OUTPATIENT
Start: 2022-03-29 | End: 2022-04-22 | Stop reason: SDUPTHER

## 2022-03-30 ENCOUNTER — PATIENT MESSAGE (OUTPATIENT)
Dept: FAMILY MEDICINE | Facility: CLINIC | Age: 47
End: 2022-03-30

## 2022-04-22 ENCOUNTER — OFFICE VISIT (OUTPATIENT)
Dept: FAMILY MEDICINE | Facility: CLINIC | Age: 47
End: 2022-04-22
Payer: COMMERCIAL

## 2022-04-22 VITALS
RESPIRATION RATE: 18 BRPM | DIASTOLIC BLOOD PRESSURE: 80 MMHG | BODY MASS INDEX: 46.3 KG/M2 | HEIGHT: 63 IN | TEMPERATURE: 99 F | WEIGHT: 261.31 LBS | SYSTOLIC BLOOD PRESSURE: 121 MMHG | HEART RATE: 58 BPM | OXYGEN SATURATION: 98 %

## 2022-04-22 DIAGNOSIS — E55.9 VITAMIN D DEFICIENCY: ICD-10-CM

## 2022-04-22 DIAGNOSIS — I10 GOOD HYPERTENSION CONTROL: ICD-10-CM

## 2022-04-22 DIAGNOSIS — I10 HYPERTENSION, UNSPECIFIED TYPE: Primary | ICD-10-CM

## 2022-04-22 PROBLEM — Z12.11 COLON CANCER SCREENING: Status: RESOLVED | Noted: 2021-06-04 | Resolved: 2022-04-22

## 2022-04-22 PROCEDURE — 3074F SYST BP LT 130 MM HG: CPT | Mod: S$GLB,,, | Performed by: NURSE PRACTITIONER

## 2022-04-22 PROCEDURE — 3074F PR MOST RECENT SYSTOLIC BLOOD PRESSURE < 130 MM HG: ICD-10-PCS | Mod: S$GLB,,, | Performed by: NURSE PRACTITIONER

## 2022-04-22 PROCEDURE — 3079F PR MOST RECENT DIASTOLIC BLOOD PRESSURE 80-89 MM HG: ICD-10-PCS | Mod: S$GLB,,, | Performed by: NURSE PRACTITIONER

## 2022-04-22 PROCEDURE — 4010F PR ACE/ARB THEARPY RXD/TAKEN: ICD-10-PCS | Mod: S$GLB,,, | Performed by: NURSE PRACTITIONER

## 2022-04-22 PROCEDURE — 1160F PR REVIEW ALL MEDS BY PRESCRIBER/CLIN PHARMACIST DOCUMENTED: ICD-10-PCS | Mod: S$GLB,,, | Performed by: NURSE PRACTITIONER

## 2022-04-22 PROCEDURE — 99213 PR OFFICE/OUTPT VISIT, EST, LEVL III, 20-29 MIN: ICD-10-PCS | Mod: S$GLB,,, | Performed by: NURSE PRACTITIONER

## 2022-04-22 PROCEDURE — 4010F ACE/ARB THERAPY RXD/TAKEN: CPT | Mod: S$GLB,,, | Performed by: NURSE PRACTITIONER

## 2022-04-22 PROCEDURE — 99213 OFFICE O/P EST LOW 20 MIN: CPT | Mod: S$GLB,,, | Performed by: NURSE PRACTITIONER

## 2022-04-22 PROCEDURE — 3008F PR BODY MASS INDEX (BMI) DOCUMENTED: ICD-10-PCS | Mod: S$GLB,,, | Performed by: NURSE PRACTITIONER

## 2022-04-22 PROCEDURE — 3008F BODY MASS INDEX DOCD: CPT | Mod: S$GLB,,, | Performed by: NURSE PRACTITIONER

## 2022-04-22 PROCEDURE — 1160F RVW MEDS BY RX/DR IN RCRD: CPT | Mod: S$GLB,,, | Performed by: NURSE PRACTITIONER

## 2022-04-22 PROCEDURE — 3079F DIAST BP 80-89 MM HG: CPT | Mod: S$GLB,,, | Performed by: NURSE PRACTITIONER

## 2022-04-22 RX ORDER — HYDROCHLOROTHIAZIDE 25 MG/1
25 TABLET ORAL DAILY
Qty: 90 TABLET | Refills: 2 | Status: SHIPPED | OUTPATIENT
Start: 2022-04-22 | End: 2022-10-31 | Stop reason: SDUPTHER

## 2022-04-22 RX ORDER — PROPRANOLOL HYDROCHLORIDE 160 MG/1
160 CAPSULE, EXTENDED RELEASE ORAL DAILY
Qty: 90 CAPSULE | Refills: 1 | Status: SHIPPED | OUTPATIENT
Start: 2022-04-22 | End: 2022-10-31 | Stop reason: SDUPTHER

## 2022-04-22 RX ORDER — ERGOCALCIFEROL 1.25 MG/1
50000 CAPSULE ORAL
Qty: 4 CAPSULE | Refills: 2 | Status: SHIPPED | OUTPATIENT
Start: 2022-04-22 | End: 2022-09-08

## 2022-04-22 RX ORDER — OLMESARTAN MEDOXOMIL 40 MG/1
40 TABLET ORAL DAILY
Qty: 90 TABLET | Refills: 1 | Status: SHIPPED | OUTPATIENT
Start: 2022-04-22 | End: 2022-10-31 | Stop reason: SDUPTHER

## 2022-04-22 NOTE — PROGRESS NOTES
SUBJECTIVE:      Patient ID: Kitty Jennings is a 46 y.o. female.    Chief Complaint: Follow-up and Hypertension    Kitty is here for follow up on HTN and medication refills. Taking her medications as prescribed daily- no SE or complaints. Labs reviewed from 3/22. Pt admits she is not eating a healthy diet or drinking enough water due to being consumed by her job. Bp is well-controlled today on her current regimen. Denies new sx or complaints.     Hypertension  This is a chronic problem. The current episode started more than 1 year ago. The problem has been gradually improving since onset. The problem is controlled. Associated symptoms include peripheral edema. Pertinent negatives include no anxiety, blurred vision, chest pain, headaches, malaise/fatigue, neck pain, orthopnea, palpitations, shortness of breath or sweats. There are no associated agents to hypertension. Risk factors for coronary artery disease include obesity, sedentary lifestyle and family history. Past treatments include beta blockers, diuretics and angiotensin blockers. The current treatment provides significant improvement. Compliance problems include exercise and diet.  There is no history of angina, kidney disease, CAD/MI or CVA. There is no history of sleep apnea or a thyroid problem.   Edema  This is a recurrent problem. The current episode started more than 1 month ago. The problem occurs intermittently. The problem has been gradually worsening. Pertinent negatives include no abdominal pain, anorexia, arthralgias, change in bowel habit, chest pain, chills, congestion, coughing, diaphoresis, fatigue, fever, headaches, joint swelling, myalgias, nausea, neck pain, rash, sore throat, swollen glands, urinary symptoms, visual change, vomiting or weakness. The symptoms are aggravated by standing. Treatments tried: elevation, rest. The treatment provided moderate relief.       Family History   Problem Relation Age of Onset    Hypertension  Mother     Diabetes type II Mother     Diverticulosis Mother     Diverticulitis Mother     Hypertension Father     Heart disease Father     Diabetes type II Father     Hypertension Sister     Diverticulitis Sister     Hypertension Brother     Ovarian cancer Sister     No Known Problems Sister     No Known Problems Daughter     Colon cancer Neg Hx       Social History     Socioeconomic History    Marital status: Single   Tobacco Use    Smoking status: Never Smoker    Smokeless tobacco: Never Used   Substance and Sexual Activity    Alcohol use: No    Drug use: No    Sexual activity: Not Currently     Current Outpatient Medications   Medication Sig Dispense Refill    cetirizine (ZYRTEC) 10 MG tablet Take 1 tablet (10 mg total) by mouth 2 (two) times daily. 180 tablet 4    esomeprazole (NEXIUM) 40 MG capsule TAKE 1 CAPSULE BY MOUTH EVERY DAY 90 capsule 3    ergocalciferol (ERGOCALCIFEROL) 50,000 unit Cap Take 1 capsule (50,000 Units total) by mouth every 7 days. 4 capsule 2    hydroCHLOROthiazide (HYDRODIURIL) 25 MG tablet Take 1 tablet (25 mg total) by mouth once daily. 90 tablet 2    olmesartan (BENICAR) 40 MG tablet Take 1 tablet (40 mg total) by mouth once daily. 90 tablet 1    propranoloL (INDERAL LA) 160 mg 24 hr capsule Take 1 capsule (160 mg total) by mouth once daily. 90 capsule 1     No current facility-administered medications for this visit.     Review of patient's allergies indicates:  No Known Allergies   Past Medical History:   Diagnosis Date    Anemia     Diverticulosis     GERD (gastroesophageal reflux disease)     Hives of unknown origin     Hx of migraines     Hypertension      Past Surgical History:   Procedure Laterality Date    BARIATRIC SURGERY  09/11/2013    BREAST SURGERY      reduction    COLONOSCOPY N/A 6/4/2021    Procedure: COLONOSCOPY;  Surgeon: Ana Oscar MD;  Location: [facility];  Service: Endoscopy;  Laterality: N/A;    gastric sleeve       "lipoma removal      TOTAL REDUCTION MAMMOPLASTY Bilateral 2018    UPPER GASTROINTESTINAL ENDOSCOPY  2016    Dr. Reid; hiatal hernia per pt report       Review of Systems   Constitutional: Negative for activity change, appetite change, chills, diaphoresis, fatigue, fever, malaise/fatigue and unexpected weight change.   HENT: Negative for congestion, hearing loss, rhinorrhea, sore throat and trouble swallowing.    Eyes: Negative for blurred vision, discharge and visual disturbance.   Respiratory: Negative for cough, chest tightness, shortness of breath and wheezing.    Cardiovascular: Positive for leg swelling. Negative for chest pain, palpitations and orthopnea.   Gastrointestinal: Negative for abdominal pain, anorexia, blood in stool, change in bowel habit, constipation, diarrhea, nausea and vomiting.   Endocrine: Negative for cold intolerance, heat intolerance, polydipsia and polyuria.   Genitourinary: Negative for difficulty urinating, dysuria, frequency, hematuria and menstrual problem.   Musculoskeletal: Negative for arthralgias, joint swelling, myalgias and neck pain.   Skin: Negative for rash.   Neurological: Negative for weakness and headaches.   Hematological: Negative for adenopathy.   Psychiatric/Behavioral: Negative for confusion and dysphoric mood.      OBJECTIVE:      Vitals:    04/22/22 1045   BP: 121/80   BP Location: Right arm   Patient Position: Sitting   BP Method: Large (Manual)   Pulse: (!) 58   Resp: 18   Temp: 98.5 °F (36.9 °C)   SpO2: 98%   Weight: 118.5 kg (261 lb 4.8 oz)   Height: 5' 3" (1.6 m)     Physical Exam  Vitals and nursing note reviewed.   Constitutional:       General: She is not in acute distress.     Appearance: Normal appearance. She is well-developed. She is not ill-appearing.      Comments: Morbid obesity    HENT:      Head: Normocephalic.      Mouth/Throat:      Mouth: Mucous membranes are moist.   Eyes:      Pupils: Pupils are equal, round, and reactive to light.   Neck: "      Thyroid: No thyroid mass or thyromegaly.      Trachea: Trachea normal.   Cardiovascular:      Rate and Rhythm: Normal rate and regular rhythm.      Heart sounds: Normal heart sounds.   Pulmonary:      Effort: Pulmonary effort is normal.      Breath sounds: Normal breath sounds.   Musculoskeletal:         General: Normal range of motion.      Cervical back: Normal range of motion and neck supple.      Right lower leg: Edema (trace edema ) present.      Left lower leg: Edema (trace edma ) present.   Lymphadenopathy:      Cervical: No cervical adenopathy.   Skin:     General: Skin is warm and dry.      Coloration: Skin is not jaundiced or pale.   Neurological:      Mental Status: She is alert and oriented to person, place, and time.   Psychiatric:         Mood and Affect: Mood normal.         Behavior: Behavior normal.         Thought Content: Thought content normal.         Judgment: Judgment normal.        Assessment:       1. Hypertension, unspecified type    2. Vitamin D deficiency    3. Good hypertension control        Plan:       Hypertension, unspecified type  -     hydroCHLOROthiazide (HYDRODIURIL) 25 MG tablet; Take 1 tablet (25 mg total) by mouth once daily.  Dispense: 90 tablet; Refill: 2  -     olmesartan (BENICAR) 40 MG tablet; Take 1 tablet (40 mg total) by mouth once daily.  Dispense: 90 tablet; Refill: 1  -stable; need to increase water and discussed other lifestyle changes; Lifestyle changes: Reduce the amount of salt in your diet; Lose weight; Avoid drinking too much alcohol; Exercise at least 30 minutes per day most days of the week.     Vitamin D deficiency  -     ergocalciferol (ERGOCALCIFEROL) 50,000 unit Cap; Take 1 capsule (50,000 Units total) by mouth every 7 days.  Dispense: 4 capsule; Refill: 2   -recheck in 3 mo     Good hypertension control  -     propranoloL (INDERAL LA) 160 mg 24 hr capsule; Take 1 capsule (160 mg total) by mouth once daily.  Dispense: 90 capsule; Refill:  1        Follow up in about 6 months (around 10/22/2022) for annual .      4/22/2022 HIMANSHU Villa, FNP    This note was created using [employer] voice recognition software that occasionally misinterprets phrases or words.

## 2022-05-24 ENCOUNTER — PATIENT MESSAGE (OUTPATIENT)
Dept: FAMILY MEDICINE | Facility: CLINIC | Age: 47
End: 2022-05-24

## 2022-05-25 ENCOUNTER — TELEPHONE (OUTPATIENT)
Dept: FAMILY MEDICINE | Facility: CLINIC | Age: 47
End: 2022-05-25

## 2022-05-25 NOTE — TELEPHONE ENCOUNTER
The following medication needs a prior authorization:      Medication Name: Esomeprazole      Dosage: 40mg     Frequency: daily      Directions for use: Take 1 capsule by mouth every day     Diagnosis: Gastroesophageal reflux disease, unspecified whether esophagitis present     Is the request for a reauthorization? Yes     Is the patient currently stable on therapy? Yes     Please list all therapeutic alternatives previously used with start/end dates and outcome: Esomeprazole 3/4/19-4/23/21

## 2022-05-26 ENCOUNTER — PATIENT MESSAGE (OUTPATIENT)
Dept: FAMILY MEDICINE | Facility: CLINIC | Age: 47
End: 2022-05-26

## 2022-06-02 ENCOUNTER — PATIENT MESSAGE (OUTPATIENT)
Dept: FAMILY MEDICINE | Facility: CLINIC | Age: 47
End: 2022-06-02

## 2022-06-15 ENCOUNTER — OFFICE VISIT (OUTPATIENT)
Dept: HEMATOLOGY/ONCOLOGY | Facility: CLINIC | Age: 47
End: 2022-06-15
Payer: COMMERCIAL

## 2022-06-15 VITALS
DIASTOLIC BLOOD PRESSURE: 64 MMHG | SYSTOLIC BLOOD PRESSURE: 101 MMHG | WEIGHT: 258 LBS | BODY MASS INDEX: 45.71 KG/M2 | HEIGHT: 63 IN | TEMPERATURE: 97 F | RESPIRATION RATE: 18 BRPM | HEART RATE: 52 BPM

## 2022-06-15 DIAGNOSIS — D51.8 ANEMIA OF DECREASED VITAMIN B12 ABSORPTION: ICD-10-CM

## 2022-06-15 DIAGNOSIS — D50.8 IRON DEFICIENCY ANEMIA FOLLOWING BARIATRIC SURGERY: Primary | ICD-10-CM

## 2022-06-15 DIAGNOSIS — E55.9 VITAMIN D DEFICIENCY: ICD-10-CM

## 2022-06-15 DIAGNOSIS — K95.89 IRON DEFICIENCY ANEMIA FOLLOWING BARIATRIC SURGERY: Primary | ICD-10-CM

## 2022-06-15 PROCEDURE — 1159F PR MEDICATION LIST DOCUMENTED IN MEDICAL RECORD: ICD-10-PCS | Mod: CPTII,S$GLB,, | Performed by: INTERNAL MEDICINE

## 2022-06-15 PROCEDURE — 3078F DIAST BP <80 MM HG: CPT | Mod: CPTII,S$GLB,, | Performed by: INTERNAL MEDICINE

## 2022-06-15 PROCEDURE — 4010F PR ACE/ARB THEARPY RXD/TAKEN: ICD-10-PCS | Mod: CPTII,S$GLB,, | Performed by: INTERNAL MEDICINE

## 2022-06-15 PROCEDURE — 99214 OFFICE O/P EST MOD 30 MIN: CPT | Mod: S$GLB,,, | Performed by: INTERNAL MEDICINE

## 2022-06-15 PROCEDURE — 99214 PR OFFICE/OUTPT VISIT, EST, LEVL IV, 30-39 MIN: ICD-10-PCS | Mod: S$GLB,,, | Performed by: INTERNAL MEDICINE

## 2022-06-15 PROCEDURE — 3008F BODY MASS INDEX DOCD: CPT | Mod: CPTII,S$GLB,, | Performed by: INTERNAL MEDICINE

## 2022-06-15 PROCEDURE — 4010F ACE/ARB THERAPY RXD/TAKEN: CPT | Mod: CPTII,S$GLB,, | Performed by: INTERNAL MEDICINE

## 2022-06-15 PROCEDURE — 3008F PR BODY MASS INDEX (BMI) DOCUMENTED: ICD-10-PCS | Mod: CPTII,S$GLB,, | Performed by: INTERNAL MEDICINE

## 2022-06-15 PROCEDURE — 3074F SYST BP LT 130 MM HG: CPT | Mod: CPTII,S$GLB,, | Performed by: INTERNAL MEDICINE

## 2022-06-15 PROCEDURE — 1159F MED LIST DOCD IN RCRD: CPT | Mod: CPTII,S$GLB,, | Performed by: INTERNAL MEDICINE

## 2022-06-15 PROCEDURE — 3078F PR MOST RECENT DIASTOLIC BLOOD PRESSURE < 80 MM HG: ICD-10-PCS | Mod: CPTII,S$GLB,, | Performed by: INTERNAL MEDICINE

## 2022-06-15 PROCEDURE — 3074F PR MOST RECENT SYSTOLIC BLOOD PRESSURE < 130 MM HG: ICD-10-PCS | Mod: CPTII,S$GLB,, | Performed by: INTERNAL MEDICINE

## 2022-06-16 NOTE — PROGRESS NOTES
[facility] hematology Oncology in office follow-up progress note  6/15/22    Subjective:      Patient ID:   Kitty Jennings  47 y.o. female  1975  Cecilia White NP      Chief Complaint:   Anemia eval.    HPI:  47 y.o. female with hx of Fe deficiency anemia, treated with oral Fe in the past.  Admits to fatigue decreasing.  Energy /10.  More time out of bed after a days work.  Intermittent hives since age 19.  Zertec and benadryl prn.  Dr. Chavez feels hives may be stress induced.  She does not get hives on the weekend, away from her job duties.    S/P gastric sleave surgery.  S/P Injectafer.  Hgb 13.2.  B 6 resolved peripheral neuropathy.  B 6 4 to 53 and now at 2.8.  Resume B 6 daily.  B 12 is 297.  She has not been taking her B 12 or B 6 supplements.  Resume  B 12 and B6 po now.      Stronger.  Hive sx have improved.  She saw Dr. Chavez.  Measures taken to control hives sx.  Hives sx controlled with Zertec 20 mg daily.     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  Smoke no.  ETOH no. Allergy no.    Hx HTN, GERD, migraine HA.  Menses NL flow.  She has been sleeping poorly, under stress, caring for her mother, in failing health.    Gastric sleave surgery 13.  mammogram on 21 [facility]. Neg for Cancer.    Colonoscopy 2022 diverticulosis.    M0  Menses onset 11  1st live child at 22    No family hx of anemia.  Dad HTN, DM  Mom A & W  Sister HTN x's 2    Her daughter had covid, pt did not.  Pt has gotten 2/2 vaccines, booster, but not the flu shot.    ROS:   GEN: normal without any fever, night sweats or weight loss  HEENT: normal with no HA's, sore throat, stiff neck, changes in vision  CV: normal with no CP, SOB, PND, KAM or orthopnea  PULM: normal with no SOB, cough, hemoptysis, sputum or pleuritic pain  GI: normal with no abdominal pain, nausea, vomiting, constipation, diarrhea, melanotic stools, BRBPR, or hematemesis  : normal with no hematuria, dysuria  BREAST: normal with no  "mass, discharge, pain  SKIN: see HPI      Review of patient's allergies indicates:  No Known Allergies        Current Outpatient Medications:     cetirizine (ZYRTEC) 10 MG tablet, Take 1 tablet (10 mg total) by mouth 2 (two) times daily., Disp: 180 tablet, Rfl: 4    ergocalciferol (ERGOCALCIFEROL) 50,000 unit Cap, Take 1 capsule (50,000 Units total) by mouth every 7 days., Disp: 4 capsule, Rfl: 2    esomeprazole (NEXIUM) 40 MG capsule, TAKE 1 CAPSULE BY MOUTH EVERY DAY, Disp: 90 capsule, Rfl: 3    hydroCHLOROthiazide (HYDRODIURIL) 25 MG tablet, Take 1 tablet (25 mg total) by mouth once daily., Disp: 90 tablet, Rfl: 2    olmesartan (BENICAR) 40 MG tablet, Take 1 tablet (40 mg total) by mouth once daily., Disp: 90 tablet, Rfl: 1    propranoloL (INDERAL LA) 160 mg 24 hr capsule, Take 1 capsule (160 mg total) by mouth once daily., Disp: 90 capsule, Rfl: 1          Objective:   Vitals:  Blood pressure 101/64, pulse (!) 52, temperature 97.3 °F (36.3 °C), resp. rate 18, height 5' 3" (1.6 m), weight 117 kg (258 lb).    She did not aggree to physical exam.    Assessment:   (1) 47 y.o. female with diagnosis of Fe deficiency 2nd decreased absorption 2nd bariatric surgery.  Gave  Injectafer  Weekly x's 2 to replenish Fe stores.  Estimated 1-2% risk of reaction, she tolerated it well.  [facility].  Anemia resolved to NL, with Fe replenishment.    (2)Referred to Dr. Lori Chavez of allergy/ immunology to try clarify hives and Rx. Hives controlled with Zertec 20 mg daily.    On B 6 po, PN sx in feet resolved.    Injectafer infusion x's 1 at .  Completed.   Off B 6 and B 12 po now.    CBC, B 12, ferritin and RTC 6 months.   [facility].  RTC 6 months.        1. Iron deficiency anemia following bariatric surgery    2. Anemia of decreased vitamin B12 absorption    3. Vitamin D deficiency        Plan:       Iron deficiency anemia following bariatric surgery  -     Vitamin B12; Future; Expected date: 12/06/2022  -     " Ferritin; Future; Expected date: 12/06/2022  -     CBC Auto Differential; Future; Expected date: 12/06/2022    Anemia of decreased vitamin B12 absorption  -     Vitamin B12; Future; Expected date: 12/06/2022  -     Ferritin; Future; Expected date: 12/06/2022  -     CBC Auto Differential; Future; Expected date: 12/06/2022    Vitamin D deficiency  -     Vitamin B12; Future; Expected date: 12/06/2022  -     Ferritin; Future; Expected date: 12/06/2022  -     CBC Auto Differential; Future; Expected date: 12/06/2022  -     Vitamin D; Future; Expected date: 12/06/2022      Follow up in about 6 months (around 12/15/2022) for check of blood status after therapy.              headaches:

## 2022-07-18 DIAGNOSIS — K21.9 GASTROESOPHAGEAL REFLUX DISEASE, UNSPECIFIED WHETHER ESOPHAGITIS PRESENT: ICD-10-CM

## 2022-07-18 RX ORDER — ESOMEPRAZOLE MAGNESIUM 40 MG/1
CAPSULE, DELAYED RELEASE ORAL
Qty: 90 CAPSULE | Refills: 0 | Status: SHIPPED | OUTPATIENT
Start: 2022-07-18 | End: 2022-10-31 | Stop reason: SDUPTHER

## 2022-09-06 ENCOUNTER — NURSE TRIAGE (OUTPATIENT)
Dept: ADMINISTRATIVE | Facility: CLINIC | Age: 47
End: 2022-09-06
Payer: COMMERCIAL

## 2022-09-06 NOTE — TELEPHONE ENCOUNTER
Call placed on the board by hospital , attempted to contact the patient x 2, no answer, left vm x 2.  Reason for Disposition   Second attempt to contact caller AND no contact made. Phone number verified.    Additional Information   Negative: Caller has already spoken with the PCP (or office), and has no further questions   Negative: Caller has already spoken with another triager and has no further questions   Negative: Caller has already spoken with another triager or PCP (or office), and has further questions and triager able to answer questions.   Negative: Message left with person in household   Negative: Wrong number reached. Phone number verified.   Negative: Message left on identified voicemail   Negative: Message left on unidentified voice mail. Phone number verified.   Negative: Busy signal.  First attempt to contact caller.  Follow-up call scheduled within 15 minutes.   Negative: No answer.  First attempt to contact caller.  Follow-up call scheduled within 15 minutes.    Protocols used: No Contact or Duplicate Contact Call-A-OH

## 2022-09-06 NOTE — TELEPHONE ENCOUNTER
Sinus drainage yellow with traces of blood, congestion, sneezing pain and pressure, ears ringing. Took covid test this am and it was negative. Sinus pain 4-5/10. Around rt eye is swollen and red. Care advice recommend pt come into office now. Pt offered and accepted OAC.   Reason for Disposition   Redness or swelling on the cheek, forehead, or around the eye    Additional Information   Negative: Sounds like a life-threatening emergency to the triager   Negative: Difficulty breathing, and not from stuffy nose (e.g., not relieved by cleaning out the nose)   Negative: SEVERE headache and has fever   Negative: Patient sounds very sick or weak to the triager   Negative: Severe sinus pain   Negative: Severe headache    Protocols used: Sinus Pain and Congestion-A-OH

## 2022-09-08 ENCOUNTER — OFFICE VISIT (OUTPATIENT)
Dept: FAMILY MEDICINE | Facility: CLINIC | Age: 47
End: 2022-09-08
Payer: COMMERCIAL

## 2022-09-08 VITALS
TEMPERATURE: 99 F | SYSTOLIC BLOOD PRESSURE: 110 MMHG | HEART RATE: 76 BPM | OXYGEN SATURATION: 98 % | DIASTOLIC BLOOD PRESSURE: 70 MMHG | HEIGHT: 63 IN | WEIGHT: 253.63 LBS | RESPIRATION RATE: 20 BRPM | BODY MASS INDEX: 44.94 KG/M2

## 2022-09-08 DIAGNOSIS — R06.2 WHEEZING: ICD-10-CM

## 2022-09-08 DIAGNOSIS — J01.00 ACUTE NON-RECURRENT MAXILLARY SINUSITIS: ICD-10-CM

## 2022-09-08 DIAGNOSIS — J20.9 ACUTE BRONCHITIS, UNSPECIFIED ORGANISM: Primary | ICD-10-CM

## 2022-09-08 PROCEDURE — 96372 THER/PROPH/DIAG INJ SC/IM: CPT | Mod: S$GLB,,, | Performed by: NURSE PRACTITIONER

## 2022-09-08 PROCEDURE — 3008F BODY MASS INDEX DOCD: CPT | Mod: CPTII,S$GLB,, | Performed by: NURSE PRACTITIONER

## 2022-09-08 PROCEDURE — 4010F ACE/ARB THERAPY RXD/TAKEN: CPT | Mod: CPTII,S$GLB,, | Performed by: NURSE PRACTITIONER

## 2022-09-08 PROCEDURE — 1160F PR REVIEW ALL MEDS BY PRESCRIBER/CLIN PHARMACIST DOCUMENTED: ICD-10-PCS | Mod: CPTII,S$GLB,, | Performed by: NURSE PRACTITIONER

## 2022-09-08 PROCEDURE — 4010F PR ACE/ARB THEARPY RXD/TAKEN: ICD-10-PCS | Mod: CPTII,S$GLB,, | Performed by: NURSE PRACTITIONER

## 2022-09-08 PROCEDURE — 1159F PR MEDICATION LIST DOCUMENTED IN MEDICAL RECORD: ICD-10-PCS | Mod: CPTII,S$GLB,, | Performed by: NURSE PRACTITIONER

## 2022-09-08 PROCEDURE — 1159F MED LIST DOCD IN RCRD: CPT | Mod: CPTII,S$GLB,, | Performed by: NURSE PRACTITIONER

## 2022-09-08 PROCEDURE — 3008F PR BODY MASS INDEX (BMI) DOCUMENTED: ICD-10-PCS | Mod: CPTII,S$GLB,, | Performed by: NURSE PRACTITIONER

## 2022-09-08 PROCEDURE — 99213 PR OFFICE/OUTPT VISIT, EST, LEVL III, 20-29 MIN: ICD-10-PCS | Mod: 25,S$GLB,, | Performed by: NURSE PRACTITIONER

## 2022-09-08 PROCEDURE — 1160F RVW MEDS BY RX/DR IN RCRD: CPT | Mod: CPTII,S$GLB,, | Performed by: NURSE PRACTITIONER

## 2022-09-08 PROCEDURE — 99213 OFFICE O/P EST LOW 20 MIN: CPT | Mod: 25,S$GLB,, | Performed by: NURSE PRACTITIONER

## 2022-09-08 PROCEDURE — 96372 PR INJECTION,THERAP/PROPH/DIAG2ST, IM OR SUBCUT: ICD-10-PCS | Mod: S$GLB,,, | Performed by: NURSE PRACTITIONER

## 2022-09-08 RX ORDER — DEXAMETHASONE SODIUM PHOSPHATE 4 MG/ML
4 INJECTION, SOLUTION INTRA-ARTICULAR; INTRALESIONAL; INTRAMUSCULAR; INTRAVENOUS; SOFT TISSUE ONCE
Status: COMPLETED | OUTPATIENT
Start: 2022-09-08 | End: 2022-09-08

## 2022-09-08 RX ORDER — ALBUTEROL SULFATE 0.83 MG/ML
2.5 SOLUTION RESPIRATORY (INHALATION) EVERY 6 HOURS PRN
Qty: 25 EACH | Refills: 0 | Status: SHIPPED | OUTPATIENT
Start: 2022-09-08 | End: 2022-10-31

## 2022-09-08 RX ORDER — DOXYCYCLINE HYCLATE 100 MG
100 TABLET ORAL 2 TIMES DAILY
Qty: 20 TABLET | Refills: 0 | Status: SHIPPED | OUTPATIENT
Start: 2022-09-08 | End: 2022-09-18

## 2022-09-08 RX ORDER — PROMETHAZINE HYDROCHLORIDE AND DEXTROMETHORPHAN HYDROBROMIDE 6.25; 15 MG/5ML; MG/5ML
5 SYRUP ORAL NIGHTLY PRN
Qty: 118 ML | Refills: 0 | Status: SHIPPED | OUTPATIENT
Start: 2022-09-08 | End: 2022-10-31

## 2022-09-08 RX ADMIN — DEXAMETHASONE SODIUM PHOSPHATE 4 MG: 4 INJECTION, SOLUTION INTRA-ARTICULAR; INTRALESIONAL; INTRAMUSCULAR; INTRAVENOUS; SOFT TISSUE at 08:09

## 2022-09-08 NOTE — PROGRESS NOTES
SUBJECTIVE:      Patient ID: Kitty Jennings is a 47 y.o. female.    Chief Complaint: Sinus Problem, Nasal Congestion, Facial Pain, and Cough    Tere is here today for complaints of productive cough, sinus congestion, wheezing, and mild shortness of breath which has been gradually worsening over the last week.  Patient has taken to COVID tests, 1 at home and 1 yesterday at [facility] which were both negative.  She denies any fever, but she has been taking over-the-counter medications without any improvement in her symptoms.    Sinus Problem  This is a new problem. The current episode started in the past 7 days. The problem has been gradually worsening since onset. There has been no fever. She is experiencing no pain. Associated symptoms include chills, congestion, coughing, ear pain, headaches, shortness of breath, sinus pressure, sneezing and a sore throat. Pertinent negatives include no diaphoresis, hoarse voice, neck pain or swollen glands. Treatments tried: otc meds. The treatment provided no relief.   Cough  This is a new problem. The current episode started in the past 7 days. The problem has been gradually worsening. The problem occurs hourly. The cough is Productive of purulent sputum. Associated symptoms include chills, ear congestion, ear pain, headaches, hemoptysis, nasal congestion, postnasal drip, rhinorrhea, a sore throat, shortness of breath, sweats and wheezing. Pertinent negatives include no chest pain, fever, heartburn, myalgias, rash or weight loss. The symptoms are aggravated by stress. Risk factors for lung disease include travel. She has tried OTC cough suppressant, a beta-agonist inhaler and body position changes for the symptoms. There is no history of asthma, bronchiectasis, bronchitis, COPD, emphysema, environmental allergies or pneumonia.     Family History   Problem Relation Age of Onset    Hypertension Mother     Diabetes type II Mother     Diverticulosis Mother      Diverticulitis Mother     Hypertension Father     Heart disease Father     Diabetes type II Father     Hypertension Sister     Diverticulitis Sister     Hypertension Brother     Ovarian cancer Sister     No Known Problems Sister     No Known Problems Daughter     Colon cancer Neg Hx       Social History     Socioeconomic History    Marital status: Single   Tobacco Use    Smoking status: Never    Smokeless tobacco: Never   Substance and Sexual Activity    Alcohol use: No    Drug use: No    Sexual activity: Not Currently     Current Outpatient Medications   Medication Sig Dispense Refill    cetirizine (ZYRTEC) 10 MG tablet Take 1 tablet (10 mg total) by mouth 2 (two) times daily. 180 tablet 4    esomeprazole (NEXIUM) 40 MG capsule TAKE 1 CAPSULE BY MOUTH EVERY DAY 90 capsule 0    hydroCHLOROthiazide (HYDRODIURIL) 25 MG tablet Take 1 tablet (25 mg total) by mouth once daily. 90 tablet 2    olmesartan (BENICAR) 40 MG tablet Take 1 tablet (40 mg total) by mouth once daily. 90 tablet 1    propranoloL (INDERAL LA) 160 mg 24 hr capsule Take 1 capsule (160 mg total) by mouth once daily. 90 capsule 1    albuterol (PROVENTIL) 2.5 mg /3 mL (0.083 %) nebulizer solution Take 3 mLs (2.5 mg total) by nebulization every 6 (six) hours as needed for Wheezing. Rescue 25 each 0    doxycycline (VIBRA-TABS) 100 MG tablet Take 1 tablet (100 mg total) by mouth 2 (two) times daily. for 10 days 20 tablet 0    promethazine-dextromethorphan (PROMETHAZINE-DM) 6.25-15 mg/5 mL Syrp Take 5 mLs by mouth nightly as needed (cough). 118 mL 0     No current facility-administered medications for this visit.     Review of patient's allergies indicates:  No Known Allergies   Past Medical History:   Diagnosis Date    Anemia     Diverticulosis     GERD (gastroesophageal reflux disease)     Hives of unknown origin     Hx of migraines     Hypertension      Past Surgical History:   Procedure Laterality Date    BARIATRIC SURGERY  09/11/2013    BREAST SURGERY    "   reduction    COLONOSCOPY N/A 6/4/2021    Procedure: COLONOSCOPY;  Surgeon: Ana Oscar MD;  Location: [facility];  Service: Endoscopy;  Laterality: N/A;    gastric sleeve      lipoma removal      TOTAL REDUCTION MAMMOPLASTY Bilateral 2018    UPPER GASTROINTESTINAL ENDOSCOPY  2016    Dr. Reid; hiatal hernia per pt report       Review of Systems   Constitutional:  Positive for chills and fatigue. Negative for appetite change, diaphoresis, fever, unexpected weight change and weight loss.   HENT:  Positive for congestion, ear pain, postnasal drip, rhinorrhea, sinus pressure, sinus pain, sneezing and sore throat. Negative for ear discharge, facial swelling, hoarse voice, trouble swallowing and voice change.    Eyes:  Negative for discharge and itching.   Respiratory:  Positive for cough, hemoptysis, shortness of breath and wheezing. Negative for chest tightness.    Cardiovascular:  Negative for chest pain.   Gastrointestinal:  Negative for abdominal pain, diarrhea, heartburn, nausea and vomiting.   Genitourinary:  Negative for decreased urine volume and dysuria.   Musculoskeletal:  Negative for back pain, myalgias and neck pain.   Skin:  Negative for pallor and rash.   Allergic/Immunologic: Negative for environmental allergies.   Neurological:  Positive for headaches. Negative for dizziness and weakness.   Hematological:  Negative for adenopathy.    OBJECTIVE:      Vitals:    09/08/22 0748   BP: 110/70   BP Location: Left arm   Patient Position: Sitting   BP Method: Large (Manual)   Pulse: 76   Resp: 20   Temp: 99.2 °F (37.3 °C)   TempSrc: Oral   SpO2: 98%   Weight: 115 kg (253 lb 9.6 oz)   Height: 5' 3" (1.6 m)     Physical Exam  Vitals and nursing note reviewed.   Constitutional:       General: She is not in acute distress.     Appearance: Normal appearance. She is well-developed. She is obese.   HENT:      Head: Normocephalic and atraumatic.      Right Ear: Tympanic membrane, ear canal and external ear " normal.      Left Ear: Tympanic membrane, ear canal and external ear normal.      Nose: Mucosal edema and congestion present. No rhinorrhea.      Right Sinus: Maxillary sinus tenderness present. No frontal sinus tenderness.      Left Sinus: Maxillary sinus tenderness present. No frontal sinus tenderness.      Mouth/Throat:      Mouth: Mucous membranes are moist.      Pharynx: Oropharynx is clear. Uvula midline.   Eyes:      General:         Right eye: No discharge.         Left eye: No discharge.      Conjunctiva/sclera: Conjunctivae normal.      Pupils: Pupils are equal, round, and reactive to light.   Neck:      Thyroid: No thyromegaly.   Cardiovascular:      Rate and Rhythm: Normal rate and regular rhythm.      Heart sounds: Normal heart sounds.   Pulmonary:      Effort: Pulmonary effort is normal. No respiratory distress.      Breath sounds: Rhonchi (few scattered in upper lobes) present. No wheezing or rales.   Abdominal:      General: Bowel sounds are normal.      Palpations: Abdomen is soft.      Tenderness: There is no abdominal tenderness.   Musculoskeletal:         General: Normal range of motion.      Cervical back: Normal range of motion and neck supple.   Lymphadenopathy:      Cervical: No cervical adenopathy.   Skin:     General: Skin is warm and dry.      Coloration: Skin is not jaundiced or pale.   Neurological:      Mental Status: She is alert and oriented to person, place, and time.   Psychiatric:         Mood and Affect: Mood normal.         Behavior: Behavior normal.         Thought Content: Thought content normal.         Judgment: Judgment normal.      Assessment:       1. Acute bronchitis, unspecified organism    2. Acute non-recurrent maxillary sinusitis    3. Wheezing        Plan:       Acute bronchitis, unspecified organism  -     dexamethasone injection 4 mg  -     doxycycline (VIBRA-TABS) 100 MG tablet; Take 1 tablet (100 mg total) by mouth 2 (two) times daily. for 10 days  Dispense: 20  tablet; Refill: 0  -     promethazine-dextromethorphan (PROMETHAZINE-DM) 6.25-15 mg/5 mL Syrp; Take 5 mLs by mouth nightly as needed (cough).  Dispense: 118 mL; Refill: 0    Acute non-recurrent maxillary sinusitis  -     doxycycline (VIBRA-TABS) 100 MG tablet; Take 1 tablet (100 mg total) by mouth 2 (two) times daily. for 10 days  Dispense: 20 tablet; Refill: 0    Wheezing  -     albuterol (PROVENTIL) 2.5 mg /3 mL (0.083 %) nebulizer solution; Take 3 mLs (2.5 mg total) by nebulization every 6 (six) hours as needed for Wheezing. Rescue  Dispense: 25 each; Refill: 0    -advised abx as prescribed with food, Mucinex in am daily, increase clear fluids, albuterol nebs prn wheezing or SOB; cough med at night prn     Follow up if symptoms worsen or fail to improve.      9/8/2022 HIMANSHU Villa, FNP    This note was created using [employer] voice recognition software that occasionally misinterprets phrases or words.

## 2022-09-08 NOTE — LETTER
September 8, 2022      [facility] Family / Internal Medicine  [address] [address]  Phone: [phone]  Fax: [phone]       Patient: Kitty Jennings   YOB: 1975  Date of Visit: 09/08/2022    To Whom It May Concern:    Michelle Jennings  was at [facility] on 09/08/2022. The patient may return to work/school on 9/9/22 with no restrictions. If you have any questions or concerns, or if I can be of further assistance, please do not hesitate to contact me.    Sincerely,      HIMANSHU Gomes LPN

## 2022-10-31 ENCOUNTER — OFFICE VISIT (OUTPATIENT)
Dept: FAMILY MEDICINE | Facility: CLINIC | Age: 47
End: 2022-10-31
Payer: COMMERCIAL

## 2022-10-31 VITALS
DIASTOLIC BLOOD PRESSURE: 74 MMHG | BODY MASS INDEX: 44.89 KG/M2 | SYSTOLIC BLOOD PRESSURE: 124 MMHG | OXYGEN SATURATION: 98 % | RESPIRATION RATE: 16 BRPM | TEMPERATURE: 98 F | WEIGHT: 253.38 LBS | HEART RATE: 79 BPM | HEIGHT: 63 IN

## 2022-10-31 DIAGNOSIS — I10 HYPERTENSION, UNSPECIFIED TYPE: ICD-10-CM

## 2022-10-31 DIAGNOSIS — Z12.4 SCREENING FOR MALIGNANT NEOPLASM OF CERVIX: ICD-10-CM

## 2022-10-31 DIAGNOSIS — K21.9 GASTROESOPHAGEAL REFLUX DISEASE, UNSPECIFIED WHETHER ESOPHAGITIS PRESENT: ICD-10-CM

## 2022-10-31 DIAGNOSIS — Z12.31 ENCOUNTER FOR SCREENING MAMMOGRAM FOR BREAST CANCER: ICD-10-CM

## 2022-10-31 DIAGNOSIS — E66.01 CLASS 3 SEVERE OBESITY DUE TO EXCESS CALORIES WITH SERIOUS COMORBIDITY AND BODY MASS INDEX (BMI) OF 40.0 TO 44.9 IN ADULT: ICD-10-CM

## 2022-10-31 DIAGNOSIS — E55.9 VITAMIN D DEFICIENCY: ICD-10-CM

## 2022-10-31 DIAGNOSIS — Z00.01 ENCOUNTER FOR ROUTINE ADULT HEALTH EXAMINATION WITH ABNORMAL FINDINGS: Primary | ICD-10-CM

## 2022-10-31 PROCEDURE — 3074F SYST BP LT 130 MM HG: CPT | Mod: CPTII,S$GLB,, | Performed by: NURSE PRACTITIONER

## 2022-10-31 PROCEDURE — 99396 PREV VISIT EST AGE 40-64: CPT | Mod: S$GLB,,, | Performed by: NURSE PRACTITIONER

## 2022-10-31 PROCEDURE — 3074F PR MOST RECENT SYSTOLIC BLOOD PRESSURE < 130 MM HG: ICD-10-PCS | Mod: CPTII,S$GLB,, | Performed by: NURSE PRACTITIONER

## 2022-10-31 PROCEDURE — 4010F PR ACE/ARB THEARPY RXD/TAKEN: ICD-10-PCS | Mod: CPTII,S$GLB,, | Performed by: NURSE PRACTITIONER

## 2022-10-31 PROCEDURE — 1159F PR MEDICATION LIST DOCUMENTED IN MEDICAL RECORD: ICD-10-PCS | Mod: CPTII,S$GLB,, | Performed by: NURSE PRACTITIONER

## 2022-10-31 PROCEDURE — 3078F DIAST BP <80 MM HG: CPT | Mod: CPTII,S$GLB,, | Performed by: NURSE PRACTITIONER

## 2022-10-31 PROCEDURE — 4010F ACE/ARB THERAPY RXD/TAKEN: CPT | Mod: CPTII,S$GLB,, | Performed by: NURSE PRACTITIONER

## 2022-10-31 PROCEDURE — 1159F MED LIST DOCD IN RCRD: CPT | Mod: CPTII,S$GLB,, | Performed by: NURSE PRACTITIONER

## 2022-10-31 PROCEDURE — 3078F PR MOST RECENT DIASTOLIC BLOOD PRESSURE < 80 MM HG: ICD-10-PCS | Mod: CPTII,S$GLB,, | Performed by: NURSE PRACTITIONER

## 2022-10-31 PROCEDURE — 99396 PR PREVENTIVE VISIT,EST,40-64: ICD-10-PCS | Mod: S$GLB,,, | Performed by: NURSE PRACTITIONER

## 2022-10-31 RX ORDER — PROPRANOLOL HYDROCHLORIDE 160 MG/1
160 CAPSULE, EXTENDED RELEASE ORAL DAILY
Qty: 90 CAPSULE | Refills: 3 | Status: SHIPPED | OUTPATIENT
Start: 2022-10-31 | End: 2024-01-21 | Stop reason: SDUPTHER

## 2022-10-31 RX ORDER — OLMESARTAN MEDOXOMIL 40 MG/1
40 TABLET ORAL DAILY
Qty: 90 TABLET | Refills: 3 | Status: SHIPPED | OUTPATIENT
Start: 2022-10-31 | End: 2024-01-21 | Stop reason: SDUPTHER

## 2022-10-31 RX ORDER — HYDROCHLOROTHIAZIDE 25 MG/1
25 TABLET ORAL DAILY
Qty: 90 TABLET | Refills: 3 | Status: SHIPPED | OUTPATIENT
Start: 2022-10-31 | End: 2024-01-21 | Stop reason: SDUPTHER

## 2022-10-31 RX ORDER — ESOMEPRAZOLE MAGNESIUM 40 MG/1
40 CAPSULE, DELAYED RELEASE ORAL
Qty: 90 CAPSULE | Refills: 3 | Status: SHIPPED | OUTPATIENT
Start: 2022-10-31 | End: 2023-10-23 | Stop reason: SDUPTHER

## 2022-10-31 RX ORDER — ALBUTEROL SULFATE 0.63 MG/3ML
SOLUTION RESPIRATORY (INHALATION)
COMMUNITY
Start: 2022-09-06 | End: 2024-02-20

## 2022-10-31 NOTE — PROGRESS NOTES
SUBJECTIVE:   HPI: Kitty Jennings  is a 47 y.o. female who presents for annual physical .   Follow-up    Kitty is here today for annual exam and physical.  She is taking her blood pressure medications as prescribed daily without side effects or complaints-blood pressure is below goal today.  She has been feeling well overall- has changed her diet to no sugar and low salt. Edema is less and she is feeling good.  She has a follow-up care with Hematology with [facility].  Due for a mammogram in December as well as routine labs in 3/23. Colonoscopy done with GI- repeat in 10 years.  Vaccinations reviewed with patient today.  She does need a referral to gynecology for a screening Pap smear.     (Not in a hospital admission)    Review of patient's allergies indicates:  No Known Allergies  Current Outpatient Medications on File Prior to Visit   Medication Sig Dispense Refill    cetirizine (ZYRTEC) 10 MG tablet Take 1 tablet (10 mg total) by mouth 2 (two) times daily. 180 tablet 4    [DISCONTINUED] esomeprazole (NEXIUM) 40 MG capsule TAKE 1 CAPSULE BY MOUTH EVERY DAY 90 capsule 0    [DISCONTINUED] hydroCHLOROthiazide (HYDRODIURIL) 25 MG tablet Take 1 tablet (25 mg total) by mouth once daily. 90 tablet 2    [DISCONTINUED] olmesartan (BENICAR) 40 MG tablet Take 1 tablet (40 mg total) by mouth once daily. 90 tablet 1    [DISCONTINUED] propranoloL (INDERAL LA) 160 mg 24 hr capsule Take 1 capsule (160 mg total) by mouth once daily. 90 capsule 1    albuterol (ACCUNEB) 0.63 mg/3 mL Nebu       [DISCONTINUED] albuterol (PROVENTIL) 2.5 mg /3 mL (0.083 %) nebulizer solution Take 3 mLs (2.5 mg total) by nebulization every 6 (six) hours as needed for Wheezing. Rescue 25 each 0    [DISCONTINUED] promethazine-dextromethorphan (PROMETHAZINE-DM) 6.25-15 mg/5 mL Syrp Take 5 mLs by mouth nightly as needed (cough). 118 mL 0     No current facility-administered medications on file prior to visit.     Past Medical History:   Diagnosis Date     Anemia     Diverticulosis     GERD (gastroesophageal reflux disease)     Hives of unknown origin     Hx of migraines     Hypertension      Past Surgical History:   Procedure Laterality Date    BARIATRIC SURGERY  09/11/2013    BREAST SURGERY      reduction    COLONOSCOPY N/A 6/4/2021    Procedure: COLONOSCOPY;  Surgeon: Ana Oscar MD;  Location: [facility];  Service: Endoscopy;  Laterality: N/A;    gastric sleeve      lipoma removal      TOTAL REDUCTION MAMMOPLASTY Bilateral 2018    UPPER GASTROINTESTINAL ENDOSCOPY  2016    Dr. Reid; hiatal hernia per pt report     Family History   Problem Relation Age of Onset    Hypertension Mother     Diabetes type II Mother     Diverticulosis Mother     Diverticulitis Mother     Hypertension Father     Heart disease Father     Diabetes type II Father     Hypertension Sister     Diverticulitis Sister     Hypertension Brother     Ovarian cancer Sister     No Known Problems Sister     No Known Problems Daughter     Colon cancer Neg Hx      Social History     Tobacco Use    Smoking status: Never     Passive exposure: Past    Smokeless tobacco: Never   Substance Use Topics    Alcohol use: No    Drug use: No      Health Maintenance Topics with due status: Not Due       Topic Last Completion Date    Colorectal Cancer Screening 06/04/2021    Lipid Panel 03/25/2022     Immunization History   Administered Date(s) Administered    COVID-19, MRNA, LN-S, PF (Pfizer) (Purple Cap) 04/16/2021, 05/14/2021, 11/19/2021       Review of Systems   Constitutional:  Negative for activity change, appetite change, chills, fatigue, fever and unexpected weight change.   HENT:  Negative for congestion, hearing loss, rhinorrhea, sore throat and trouble swallowing.    Eyes:  Negative for pain, discharge and visual disturbance.   Respiratory:  Negative for cough, choking, chest tightness, shortness of breath and wheezing.    Cardiovascular:  Negative for chest pain, palpitations and leg swelling.  "  Gastrointestinal:  Negative for abdominal pain, blood in stool, constipation, diarrhea, nausea and vomiting.        +gerd - stable on nexium    Endocrine: Negative for cold intolerance, heat intolerance, polydipsia and polyuria.   Genitourinary:  Negative for difficulty urinating, dysuria, flank pain, frequency, hematuria, menstrual problem, vaginal bleeding and vaginal discharge.   Musculoskeletal:  Negative for arthralgias, joint swelling, myalgias and neck pain.   Skin:  Negative for pallor and rash.   Allergic/Immunologic: Negative for immunocompromised state.   Neurological:  Negative for dizziness, weakness, numbness and headaches.   Hematological:  Negative for adenopathy. Does not bruise/bleed easily.   Psychiatric/Behavioral:  Negative for confusion, dysphoric mood and sleep disturbance.     OBJECTIVE:      Vitals:    10/31/22 0949   BP: 124/74   BP Location: Right arm   Patient Position: Sitting   BP Method: Large (Manual)   Pulse: 79   Resp: 16   Temp: 98.1 °F (36.7 °C)   TempSrc: Oral   SpO2: 98%   Weight: 114.9 kg (253 lb 6.4 oz)   Height: 5' 3" (1.6 m)     Physical Exam  Vitals and nursing note reviewed.   Constitutional:       General: She is not in acute distress.     Appearance: Normal appearance. She is well-developed. She is not ill-appearing.      Comments: Morbid obesity    HENT:      Head: Normocephalic and atraumatic.      Right Ear: Tympanic membrane, ear canal and external ear normal.      Left Ear: Tympanic membrane, ear canal and external ear normal.      Nose: Nose normal.      Mouth/Throat:      Mouth: Mucous membranes are moist.      Pharynx: Oropharynx is clear. No oropharyngeal exudate.   Eyes:      General: No scleral icterus.     Extraocular Movements: Extraocular movements intact.      Conjunctiva/sclera: Conjunctivae normal.      Pupils: Pupils are equal, round, and reactive to light.   Neck:      Thyroid: No thyroid mass or thyromegaly.      Trachea: Trachea normal. "   Cardiovascular:      Rate and Rhythm: Normal rate and regular rhythm.      Pulses: Normal pulses.      Heart sounds: Normal heart sounds. No murmur heard.  Pulmonary:      Effort: Pulmonary effort is normal. No respiratory distress.      Breath sounds: Normal breath sounds. No wheezing or rales.   Abdominal:      General: Bowel sounds are normal.      Palpations: Abdomen is soft. There is no mass.      Tenderness: There is no abdominal tenderness.   Musculoskeletal:         General: Normal range of motion.      Cervical back: Normal range of motion and neck supple.      Right lower leg: No edema.      Left lower leg: No edema.   Lymphadenopathy:      Cervical: No cervical adenopathy.   Skin:     General: Skin is warm and dry.      Coloration: Skin is not jaundiced or pale.   Neurological:      Mental Status: She is alert and oriented to person, place, and time.   Psychiatric:         Mood and Affect: Mood normal.         Behavior: Behavior normal.         Thought Content: Thought content normal.         Judgment: Judgment normal.      Assessment:       1. Encounter for routine adult health examination with abnormal findings    2. Hypertension, unspecified type    3. Vitamin D deficiency    4. Gastroesophageal reflux disease, unspecified whether esophagitis present    5. Screening for malignant neoplasm of cervix    6. Encounter for screening mammogram for breast cancer    7. Class 3 severe obesity due to excess calories with serious comorbidity and body mass index (BMI) of 40.0 to 44.9 in adult        Plan:       Encounter for routine adult health examination with abnormal findings    Hypertension, unspecified type  -     hydroCHLOROthiazide (HYDRODIURIL) 25 MG tablet; Take 1 tablet (25 mg total) by mouth once daily.  Dispense: 90 tablet; Refill: 3  -     olmesartan (BENICAR) 40 MG tablet; Take 1 tablet (40 mg total) by mouth once daily.  Dispense: 90 tablet; Refill: 3  -     propranoloL (INDERAL LA) 160 mg 24 hr  capsule; Take 1 capsule (160 mg total) by mouth once daily.  Dispense: 90 capsule; Refill: 3  -     Comprehensive Metabolic Panel; Future; Expected date: 03/31/2023  -     Lipid Panel; Future; Expected date: 03/31/2023  -     TSH; Future; Expected date: 03/31/2023  -     Urinalysis; Future; Expected date: 03/31/2023  -stable, labs in 3/23    Vitamin D deficiency   -recheck with hematology in 12/22    Gastroesophageal reflux disease, unspecified whether esophagitis present  -     esomeprazole (NEXIUM) 40 MG capsule; Take 1 capsule (40 mg total) by mouth before breakfast.  Dispense: 90 capsule; Refill: 3  -declines dose lowered; cont diet changes     Screening for malignant neoplasm of cervix  -     Ambulatory referral/consult to Obstetrics / Gynecology; Future; Expected date: 11/07/2022    Encounter for screening mammogram for breast cancer  -     Mammo Digital Screening Bilat w/ Van; Future; Expected date: 10/31/2022    Class 3 severe obesity due to excess calories with serious comorbidity and body mass index (BMI) of 40.0 to 44.9 in adult      Counseled on age and gender appropriate medical preventative services, including cancer screenings, immunizations, overall nutritional health, need for a consistent exercise regimen and an overall push towards maintaining a vigorous and active lifestyle.      Follow up in about 6 months (around 4/30/2023) for f/u HTN .        This note was created using [employer] voice recognition software that occasionally misinterprets phrases or words.

## 2022-12-09 ENCOUNTER — TELEPHONE (OUTPATIENT)
Dept: HEMATOLOGY/ONCOLOGY | Facility: CLINIC | Age: 47
End: 2022-12-09

## 2022-12-09 NOTE — TELEPHONE ENCOUNTER
Spoke to pt about laboratory work needed for her appt. On 12/14/22  Patient verbalized understanding

## 2022-12-12 ENCOUNTER — LAB VISIT (OUTPATIENT)
Dept: LAB | Facility: HOSPITAL | Age: 47
End: 2022-12-12
Attending: INTERNAL MEDICINE
Payer: COMMERCIAL

## 2022-12-12 DIAGNOSIS — E55.9 VITAMIN D DEFICIENCY: ICD-10-CM

## 2022-12-12 DIAGNOSIS — D50.8 IRON DEFICIENCY ANEMIA FOLLOWING BARIATRIC SURGERY: ICD-10-CM

## 2022-12-12 DIAGNOSIS — D51.8 ANEMIA OF DECREASED VITAMIN B12 ABSORPTION: ICD-10-CM

## 2022-12-12 DIAGNOSIS — K95.89 IRON DEFICIENCY ANEMIA FOLLOWING BARIATRIC SURGERY: ICD-10-CM

## 2022-12-12 LAB
25(OH)D3+25(OH)D2 SERPL-MCNC: 12 NG/ML (ref 30–96)
BASOPHILS # BLD AUTO: 0.02 K/UL (ref 0–0.2)
BASOPHILS NFR BLD: 0.3 % (ref 0–1.9)
DIFFERENTIAL METHOD: ABNORMAL
EOSINOPHIL # BLD AUTO: 0.1 K/UL (ref 0–0.5)
EOSINOPHIL NFR BLD: 1.7 % (ref 0–8)
ERYTHROCYTE [DISTWIDTH] IN BLOOD BY AUTOMATED COUNT: 12.4 % (ref 11.5–14.5)
FERRITIN SERPL-MCNC: 80 NG/ML (ref 20–300)
HCT VFR BLD AUTO: 37 % (ref 37–48.5)
HGB BLD-MCNC: 11.8 G/DL (ref 12–16)
IMM GRANULOCYTES # BLD AUTO: 0.01 K/UL (ref 0–0.04)
IMM GRANULOCYTES NFR BLD AUTO: 0.1 % (ref 0–0.5)
LYMPHOCYTES # BLD AUTO: 2.7 K/UL (ref 1–4.8)
LYMPHOCYTES NFR BLD: 38.5 % (ref 18–48)
MCH RBC QN AUTO: 28.6 PG (ref 27–31)
MCHC RBC AUTO-ENTMCNC: 31.9 G/DL (ref 32–36)
MCV RBC AUTO: 90 FL (ref 82–98)
MONOCYTES # BLD AUTO: 0.7 K/UL (ref 0.3–1)
MONOCYTES NFR BLD: 10.3 % (ref 4–15)
NEUTROPHILS # BLD AUTO: 3.4 K/UL (ref 1.8–7.7)
NEUTROPHILS NFR BLD: 49.1 % (ref 38–73)
NRBC BLD-RTO: 0 /100 WBC
PLATELET # BLD AUTO: 276 K/UL (ref 150–450)
PMV BLD AUTO: 10.5 FL (ref 9.2–12.9)
RBC # BLD AUTO: 4.13 M/UL (ref 4–5.4)
VIT B12 SERPL-MCNC: 254 PG/ML (ref 210–950)
WBC # BLD AUTO: 6.98 K/UL (ref 3.9–12.7)

## 2022-12-12 PROCEDURE — 82607 VITAMIN B-12: CPT | Performed by: INTERNAL MEDICINE

## 2022-12-12 PROCEDURE — 36415 COLL VENOUS BLD VENIPUNCTURE: CPT | Performed by: INTERNAL MEDICINE

## 2022-12-12 PROCEDURE — 82728 ASSAY OF FERRITIN: CPT | Performed by: INTERNAL MEDICINE

## 2022-12-12 PROCEDURE — 85025 COMPLETE CBC W/AUTO DIFF WBC: CPT | Performed by: INTERNAL MEDICINE

## 2022-12-12 PROCEDURE — 82306 VITAMIN D 25 HYDROXY: CPT | Performed by: INTERNAL MEDICINE

## 2023-01-09 ENCOUNTER — HOSPITAL ENCOUNTER (OUTPATIENT)
Dept: RADIOLOGY | Facility: HOSPITAL | Age: 48
Discharge: HOME | End: 2023-01-09
Attending: NURSE PRACTITIONER
Payer: COMMERCIAL

## 2023-01-09 DIAGNOSIS — Z12.31 ENCOUNTER FOR SCREENING MAMMOGRAM FOR BREAST CANCER: ICD-10-CM

## 2023-01-09 PROCEDURE — 77067 SCR MAMMO BI INCL CAD: CPT | Mod: TC,PO

## 2023-01-18 ENCOUNTER — OFFICE VISIT (OUTPATIENT)
Dept: HEMATOLOGY/ONCOLOGY | Facility: CLINIC | Age: 48
End: 2023-01-18
Payer: COMMERCIAL

## 2023-01-18 VITALS
HEIGHT: 63 IN | SYSTOLIC BLOOD PRESSURE: 108 MMHG | HEART RATE: 67 BPM | WEIGHT: 245.38 LBS | RESPIRATION RATE: 16 BRPM | TEMPERATURE: 97 F | BODY MASS INDEX: 43.48 KG/M2 | DIASTOLIC BLOOD PRESSURE: 70 MMHG

## 2023-01-18 DIAGNOSIS — E53.8 VITAMIN B 12 DEFICIENCY: ICD-10-CM

## 2023-01-18 DIAGNOSIS — D50.8 IRON DEFICIENCY ANEMIA FOLLOWING BARIATRIC SURGERY: ICD-10-CM

## 2023-01-18 DIAGNOSIS — K95.89 IRON DEFICIENCY ANEMIA FOLLOWING BARIATRIC SURGERY: ICD-10-CM

## 2023-01-18 DIAGNOSIS — E55.9 VITAMIN D DEFICIENCY: Primary | ICD-10-CM

## 2023-01-18 PROCEDURE — 1159F MED LIST DOCD IN RCRD: CPT | Mod: CPTII,S$GLB,, | Performed by: INTERNAL MEDICINE

## 2023-01-18 PROCEDURE — 1159F PR MEDICATION LIST DOCUMENTED IN MEDICAL RECORD: ICD-10-PCS | Mod: CPTII,S$GLB,, | Performed by: INTERNAL MEDICINE

## 2023-01-18 PROCEDURE — 3078F DIAST BP <80 MM HG: CPT | Mod: CPTII,S$GLB,, | Performed by: INTERNAL MEDICINE

## 2023-01-18 PROCEDURE — 99214 OFFICE O/P EST MOD 30 MIN: CPT | Mod: S$GLB,,, | Performed by: INTERNAL MEDICINE

## 2023-01-18 PROCEDURE — 3008F PR BODY MASS INDEX (BMI) DOCUMENTED: ICD-10-PCS | Mod: CPTII,S$GLB,, | Performed by: INTERNAL MEDICINE

## 2023-01-18 PROCEDURE — 3074F PR MOST RECENT SYSTOLIC BLOOD PRESSURE < 130 MM HG: ICD-10-PCS | Mod: CPTII,S$GLB,, | Performed by: INTERNAL MEDICINE

## 2023-01-18 PROCEDURE — 3074F SYST BP LT 130 MM HG: CPT | Mod: CPTII,S$GLB,, | Performed by: INTERNAL MEDICINE

## 2023-01-18 PROCEDURE — 99214 PR OFFICE/OUTPT VISIT, EST, LEVL IV, 30-39 MIN: ICD-10-PCS | Mod: S$GLB,,, | Performed by: INTERNAL MEDICINE

## 2023-01-18 PROCEDURE — 3008F BODY MASS INDEX DOCD: CPT | Mod: CPTII,S$GLB,, | Performed by: INTERNAL MEDICINE

## 2023-01-18 PROCEDURE — 3078F PR MOST RECENT DIASTOLIC BLOOD PRESSURE < 80 MM HG: ICD-10-PCS | Mod: CPTII,S$GLB,, | Performed by: INTERNAL MEDICINE

## 2023-01-18 NOTE — PROGRESS NOTES
[facility] hematology Oncology in office follow-up progress note  23    Subjective:      Patient ID:   Kitty Jennings  47 y.o. female  1975  Cecilia White NP      Chief Complaint:   Anemia eval.    HPI:  47 y.o. female with hx of Fe deficiency anemia, treated with oral Fe in the past.  Admits to fatigue decreasing.  Energy /10.  More time out of bed after a days work.  Intermittent hives since age 19.  Zertec and benadryl prn.  Dr. Chavez feels hives may be stress induced.  She does not get hives on the weekend, away from her job duties.    S/P gastric sleave surgery.  S/P Injectafer.  Hgb 13.2.  B 6 resolved peripheral neuropathy.  B 6 4 to 53 and now at 2.8.  Resume B 6 daily.  B 12 is 297.  She has not been taking her B 12 or B 6 supplements.  Resume  B 12 and B6 po now.    Now 23, Hgb 11.8, Ferritin 115 to 80.  B 12 463 to 330 to 254, she will resume her subl B 12.  Vit D is 10-12.  Begin Vit D 2-3,000 units daily.      Stronger.  Hive sx have improved.  She saw Dr. Chavez.  Measures taken to control hives sx.  Hives sx controlled with Zertec 20 mg daily.     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  Smoke no.  ETOH no. Allergy no.    Hx HTN, GERD, migraine HA.  Menses NL flow.  She has been sleeping poorly, under stress, caring for her mother, in failing health.    Gastric sleave surgery 13.  mammogram on 21 [facility]. Neg for Cancer.    Colonoscopy 2022 diverticulosis.    M0  Menses onset 11  1st live child at 22    No family hx of anemia.  Dad HTN, DM  Mom A & W  Sister HTN x's 2    Her daughter had covid, pt did not.  Pt has gotten 2/2 vaccines, booster, but not the flu shot.    ROS:   GEN: normal without any fever, night sweats or weight loss  HEENT: normal with no HA's, sore throat, stiff neck, changes in vision  CV: normal with no CP, SOB, PND, KAM or orthopnea  PULM: normal with no SOB, cough, hemoptysis, sputum or pleuritic pain  GI: normal with no  "abdominal pain, nausea, vomiting, constipation, diarrhea, melanotic stools, BRBPR, or hematemesis  : normal with no hematuria, dysuria  BREAST: normal with no mass, discharge, pain  SKIN: see HPI      Review of patient's allergies indicates:  No Known Allergies        Current Outpatient Medications:     albuterol (ACCUNEB) 0.63 mg/3 mL Nebu, , Disp: , Rfl:     cetirizine (ZYRTEC) 10 MG tablet, Take 1 tablet (10 mg total) by mouth 2 (two) times daily., Disp: 180 tablet, Rfl: 4    esomeprazole (NEXIUM) 40 MG capsule, Take 1 capsule (40 mg total) by mouth before breakfast., Disp: 90 capsule, Rfl: 3    hydroCHLOROthiazide (HYDRODIURIL) 25 MG tablet, Take 1 tablet (25 mg total) by mouth once daily., Disp: 90 tablet, Rfl: 3    olmesartan (BENICAR) 40 MG tablet, Take 1 tablet (40 mg total) by mouth once daily., Disp: 90 tablet, Rfl: 3    propranoloL (INDERAL LA) 160 mg 24 hr capsule, Take 1 capsule (160 mg total) by mouth once daily., Disp: 90 capsule, Rfl: 3          Objective:   Vitals:  Blood pressure 108/70, pulse 67, temperature 97.3 °F (36.3 °C), resp. rate 16, height 5' 3" (1.6 m), weight 111.3 kg (245 lb 6.4 oz).    She did not aggree to physical exam.    Assessment:   (1) 47 y.o. female with diagnosis of Fe deficiency 2nd decreased absorption 2nd bariatric surgery.  Gave  Injectafer  Weekly x's 2 to replenish Fe stores.  Estimated 1-2% risk of reaction, she tolerated it well.  [facility].  Anemia resolved to NL, with Fe replenishment.    (2)Referred to Dr. Lori Chavez of allergy/ immunology to try clarify hives and Rx. Hives controlled with Zertec 20 mg daily.    On B 6 po, PN sx in feet resolved.    Injectafer infusion x's 1 at .  Completed.  Resume B 12 subl daily.  Vit D 2-3,000 units daily.    CBC, B 12, ferritin and RTC 6 months.   [facility].  RTC 6 months.             "

## 2023-01-23 ENCOUNTER — PATIENT MESSAGE (OUTPATIENT)
Dept: HEMATOLOGY/ONCOLOGY | Facility: CLINIC | Age: 48
End: 2023-01-23

## 2023-01-30 ENCOUNTER — OFFICE VISIT (OUTPATIENT)
Dept: ALLERGY | Facility: CLINIC | Age: 48
End: 2023-01-30
Payer: COMMERCIAL

## 2023-01-30 VITALS
HEART RATE: 67 BPM | TEMPERATURE: 97 F | OXYGEN SATURATION: 99 % | SYSTOLIC BLOOD PRESSURE: 125 MMHG | DIASTOLIC BLOOD PRESSURE: 78 MMHG | WEIGHT: 255.38 LBS | BODY MASS INDEX: 45.24 KG/M2

## 2023-01-30 DIAGNOSIS — L50.8 CHRONIC URTICARIA: ICD-10-CM

## 2023-01-30 PROCEDURE — 1159F PR MEDICATION LIST DOCUMENTED IN MEDICAL RECORD: ICD-10-PCS | Mod: CPTII,S$GLB,, | Performed by: ALLERGY & IMMUNOLOGY

## 2023-01-30 PROCEDURE — 1160F RVW MEDS BY RX/DR IN RCRD: CPT | Mod: CPTII,S$GLB,, | Performed by: ALLERGY & IMMUNOLOGY

## 2023-01-30 PROCEDURE — 4010F PR ACE/ARB THEARPY RXD/TAKEN: ICD-10-PCS | Mod: CPTII,S$GLB,, | Performed by: ALLERGY & IMMUNOLOGY

## 2023-01-30 PROCEDURE — 3078F PR MOST RECENT DIASTOLIC BLOOD PRESSURE < 80 MM HG: ICD-10-PCS | Mod: CPTII,S$GLB,, | Performed by: ALLERGY & IMMUNOLOGY

## 2023-01-30 PROCEDURE — 3008F BODY MASS INDEX DOCD: CPT | Mod: CPTII,S$GLB,, | Performed by: ALLERGY & IMMUNOLOGY

## 2023-01-30 PROCEDURE — 99213 PR OFFICE/OUTPT VISIT, EST, LEVL III, 20-29 MIN: ICD-10-PCS | Mod: S$GLB,,, | Performed by: ALLERGY & IMMUNOLOGY

## 2023-01-30 PROCEDURE — 4010F ACE/ARB THERAPY RXD/TAKEN: CPT | Mod: CPTII,S$GLB,, | Performed by: ALLERGY & IMMUNOLOGY

## 2023-01-30 PROCEDURE — 3074F PR MOST RECENT SYSTOLIC BLOOD PRESSURE < 130 MM HG: ICD-10-PCS | Mod: CPTII,S$GLB,, | Performed by: ALLERGY & IMMUNOLOGY

## 2023-01-30 PROCEDURE — 3074F SYST BP LT 130 MM HG: CPT | Mod: CPTII,S$GLB,, | Performed by: ALLERGY & IMMUNOLOGY

## 2023-01-30 PROCEDURE — 3078F DIAST BP <80 MM HG: CPT | Mod: CPTII,S$GLB,, | Performed by: ALLERGY & IMMUNOLOGY

## 2023-01-30 PROCEDURE — 1160F PR REVIEW ALL MEDS BY PRESCRIBER/CLIN PHARMACIST DOCUMENTED: ICD-10-PCS | Mod: CPTII,S$GLB,, | Performed by: ALLERGY & IMMUNOLOGY

## 2023-01-30 PROCEDURE — 1159F MED LIST DOCD IN RCRD: CPT | Mod: CPTII,S$GLB,, | Performed by: ALLERGY & IMMUNOLOGY

## 2023-01-30 PROCEDURE — 3008F PR BODY MASS INDEX (BMI) DOCUMENTED: ICD-10-PCS | Mod: CPTII,S$GLB,, | Performed by: ALLERGY & IMMUNOLOGY

## 2023-01-30 PROCEDURE — 99213 OFFICE O/P EST LOW 20 MIN: CPT | Mod: S$GLB,,, | Performed by: ALLERGY & IMMUNOLOGY

## 2023-01-30 RX ORDER — DOXEPIN HYDROCHLORIDE 10 MG/1
10 CAPSULE ORAL NIGHTLY PRN
Qty: 30 CAPSULE | Refills: 1 | Status: SHIPPED | OUTPATIENT
Start: 2023-01-30 | End: 2023-04-28

## 2023-01-30 RX ORDER — CETIRIZINE HYDROCHLORIDE 10 MG/1
10 TABLET ORAL 2 TIMES DAILY
Qty: 180 TABLET | Refills: 4 | Status: SHIPPED | OUTPATIENT
Start: 2023-01-30 | End: 2024-03-01 | Stop reason: SDUPTHER

## 2023-01-30 NOTE — PROGRESS NOTES
"  Subjective:       Patient ID: Kitty Jennings is a 47 y.o. female.    Chief Complaint: Chronic urticaria    Pt presents for urticaria.     2/2022 was her last visit,     Her hives are "dormant" as long as she takes 2 zyrtec qam.   Otherwise ok.     Angioedema has occurred summer of 2021 in the form on a stye that resolved after a 1.5 yr span.   Thinks her lip has also been swollen as well.     Zyrtec daily has controlled her symptoms.     Condition: improved   Urticaria onset: Started in her teenage years  Associated facial swelling with hives, last week facial swelling.   Frequency: resolved as long as on zyrtec   Recently started working for [employer] x 8 years.   Tx: 2 zyrtec q am.      Saw another allergist: Dr. Waldron 5 years ago.   Prior had allergy testing   Serum and skin test was negative.               General: neg unexpected weight changes, fevers, chills, night sweats, malaise  HEENT: see hpi, Neg eye pain, vision changes, ear drainage, nose bleeds, throat tightness, sores in the mouth  CV: Neg chest pain, palpitations, swelling  Resp: see hpi, neg shortness of breath, hemoptysis, cough  GI: see hpi, neg dysphagia, night abdominal pain, reflux, chronic diarrhea, chronic constipation  Derm: See Hpi,  neg flushing  Mu/sk: Neg joint pain, joint swelling   Psych: Neg anxiety  neuro: neg chronic headaches, muscle weakness  Endo: neg heat/cold intolerance, chronic fatigue    Objective:     Vitals:    01/30/23 0829   BP: 125/78   Pulse: 67   Temp: 97 °F (36.1 °C)   SpO2: 99%   Weight: 115.8 kg (255 lb 6.4 oz)         Physical Exam      General: no acute distress, well developed well nourished     Skin: neg lesions   Lymph: neg supraclavicular, axillary     Assessment:       1. Chronic urticaria          Plan:       Chronic urticaria  -     cetirizine (ZYRTEC) 10 MG tablet; Take 1 tablet (10 mg total) by mouth 2 (two) times daily.  Dispense: 180 tablet; Refill: 4  -     doxepin (SINEQUAN) 10 MG capsule; Take 1 " capsule (10 mg total) by mouth nightly as needed (itching).  Dispense: 30 capsule; Refill: 1      1/23:  Start doxepin prn having problems sleeping.   explained urticaria and action plan and education.   currently controlled on 20 mg zyrtec qday.   continue high dose antihistamines  Consider montelukast  Consider xolair       Follow up in 12 months.   .      Lori Chavez M.D.  Allergy/Immunology  [facility]   [phone] phone  [phone] fax

## 2023-01-30 NOTE — LETTER
January 30, 2023        Cecilia White, FNP  [address]  [address]  [place] [address]             [facility] - Allergy  [address]  [address]  [place] [address]  Phone: [phone]  Fax: [phone]   Patient: Kitty Jennings   MR Number: [id number]   YOB: 1975   Date of Visit: 1/30/2023       Dear Dr. White:    Thank you for referring Kitty Jennings to me for evaluation. Below are the relevant portions of my assessment and plan of care.            If you have questions, please do not hesitate to call me. I look forward to following Kitty along with you.    Sincerely,      Lori Chavez MD           CC  No Recipients

## 2023-03-28 ENCOUNTER — TELEPHONE (OUTPATIENT)
Dept: FAMILY MEDICINE | Facility: CLINIC | Age: 48
End: 2023-03-28

## 2023-04-25 ENCOUNTER — LAB VISIT (OUTPATIENT)
Dept: LAB | Facility: HOSPITAL | Age: 48
End: 2023-04-25
Attending: NURSE PRACTITIONER
Payer: COMMERCIAL

## 2023-04-25 ENCOUNTER — OFFICE VISIT (OUTPATIENT)
Dept: ALLERGY | Facility: CLINIC | Age: 48
End: 2023-04-25
Payer: COMMERCIAL

## 2023-04-25 VITALS — DIASTOLIC BLOOD PRESSURE: 76 MMHG | HEART RATE: 70 BPM | SYSTOLIC BLOOD PRESSURE: 114 MMHG | TEMPERATURE: 97 F

## 2023-04-25 DIAGNOSIS — I10 HYPERTENSION, UNSPECIFIED TYPE: ICD-10-CM

## 2023-04-25 DIAGNOSIS — R05.1 ACUTE COUGH: ICD-10-CM

## 2023-04-25 DIAGNOSIS — J00 ACUTE RHINITIS: Primary | ICD-10-CM

## 2023-04-25 LAB
ALBUMIN SERPL BCP-MCNC: 3.8 G/DL (ref 3.5–5.2)
ALP SERPL-CCNC: 58 U/L (ref 55–135)
ALT SERPL W/O P-5'-P-CCNC: 19 U/L (ref 10–44)
ANION GAP SERPL CALC-SCNC: 8 MMOL/L (ref 8–16)
AST SERPL-CCNC: 23 U/L (ref 10–40)
BILIRUB SERPL-MCNC: 1.3 MG/DL (ref 0.1–1)
BILIRUB UR QL STRIP: NEGATIVE
BUN SERPL-MCNC: 11 MG/DL (ref 6–20)
CALCIUM SERPL-MCNC: 9.1 MG/DL (ref 8.7–10.5)
CHLORIDE SERPL-SCNC: 103 MMOL/L (ref 95–110)
CHOLEST SERPL-MCNC: 199 MG/DL (ref 120–199)
CHOLEST/HDLC SERPL: 3.8 {RATIO} (ref 2–5)
CLARITY UR: ABNORMAL
CO2 SERPL-SCNC: 27 MMOL/L (ref 23–29)
COLOR UR: YELLOW
CREAT SERPL-MCNC: 0.9 MG/DL (ref 0.5–1.4)
EST. GFR  (NO RACE VARIABLE): >60 ML/MIN/1.73 M^2
GLUCOSE SERPL-MCNC: 90 MG/DL (ref 70–110)
GLUCOSE UR QL STRIP: NEGATIVE
HDLC SERPL-MCNC: 53 MG/DL (ref 40–75)
HDLC SERPL: 26.6 % (ref 20–50)
HGB UR QL STRIP: NEGATIVE
KETONES UR QL STRIP: NEGATIVE
LDLC SERPL CALC-MCNC: 123.6 MG/DL (ref 63–159)
LEUKOCYTE ESTERASE UR QL STRIP: NEGATIVE
NITRITE UR QL STRIP: NEGATIVE
NONHDLC SERPL-MCNC: 146 MG/DL
PH UR STRIP: 6 [PH] (ref 5–8)
POTASSIUM SERPL-SCNC: 4 MMOL/L (ref 3.5–5.1)
PROT SERPL-MCNC: 7.7 G/DL (ref 6–8.4)
PROT UR QL STRIP: ABNORMAL
SODIUM SERPL-SCNC: 138 MMOL/L (ref 136–145)
SP GR UR STRIP: 1.02 (ref 1–1.03)
TRIGL SERPL-MCNC: 112 MG/DL (ref 30–150)
TSH SERPL DL<=0.005 MIU/L-ACNC: 1.19 UIU/ML (ref 0.34–5.6)
URN SPEC COLLECT METH UR: ABNORMAL
UROBILINOGEN UR STRIP-ACNC: NEGATIVE EU/DL

## 2023-04-25 PROCEDURE — 3074F SYST BP LT 130 MM HG: CPT | Mod: CPTII,S$GLB,, | Performed by: ALLERGY & IMMUNOLOGY

## 2023-04-25 PROCEDURE — 1159F PR MEDICATION LIST DOCUMENTED IN MEDICAL RECORD: ICD-10-PCS | Mod: CPTII,S$GLB,, | Performed by: ALLERGY & IMMUNOLOGY

## 2023-04-25 PROCEDURE — 80053 COMPREHEN METABOLIC PANEL: CPT | Performed by: NURSE PRACTITIONER

## 2023-04-25 PROCEDURE — 3078F PR MOST RECENT DIASTOLIC BLOOD PRESSURE < 80 MM HG: ICD-10-PCS | Mod: CPTII,S$GLB,, | Performed by: ALLERGY & IMMUNOLOGY

## 2023-04-25 PROCEDURE — 4010F PR ACE/ARB THEARPY RXD/TAKEN: ICD-10-PCS | Mod: CPTII,S$GLB,, | Performed by: ALLERGY & IMMUNOLOGY

## 2023-04-25 PROCEDURE — 3078F DIAST BP <80 MM HG: CPT | Mod: CPTII,S$GLB,, | Performed by: ALLERGY & IMMUNOLOGY

## 2023-04-25 PROCEDURE — 80061 LIPID PANEL: CPT | Performed by: NURSE PRACTITIONER

## 2023-04-25 PROCEDURE — 4010F ACE/ARB THERAPY RXD/TAKEN: CPT | Mod: CPTII,S$GLB,, | Performed by: ALLERGY & IMMUNOLOGY

## 2023-04-25 PROCEDURE — 36415 COLL VENOUS BLD VENIPUNCTURE: CPT | Performed by: NURSE PRACTITIONER

## 2023-04-25 PROCEDURE — 99214 PR OFFICE/OUTPT VISIT, EST, LEVL IV, 30-39 MIN: ICD-10-PCS | Mod: S$GLB,,, | Performed by: ALLERGY & IMMUNOLOGY

## 2023-04-25 PROCEDURE — 84443 ASSAY THYROID STIM HORMONE: CPT | Performed by: NURSE PRACTITIONER

## 2023-04-25 PROCEDURE — 1160F RVW MEDS BY RX/DR IN RCRD: CPT | Mod: CPTII,S$GLB,, | Performed by: ALLERGY & IMMUNOLOGY

## 2023-04-25 PROCEDURE — 3074F PR MOST RECENT SYSTOLIC BLOOD PRESSURE < 130 MM HG: ICD-10-PCS | Mod: CPTII,S$GLB,, | Performed by: ALLERGY & IMMUNOLOGY

## 2023-04-25 PROCEDURE — 99214 OFFICE O/P EST MOD 30 MIN: CPT | Mod: S$GLB,,, | Performed by: ALLERGY & IMMUNOLOGY

## 2023-04-25 PROCEDURE — 81003 URINALYSIS AUTO W/O SCOPE: CPT | Performed by: NURSE PRACTITIONER

## 2023-04-25 PROCEDURE — 1159F MED LIST DOCD IN RCRD: CPT | Mod: CPTII,S$GLB,, | Performed by: ALLERGY & IMMUNOLOGY

## 2023-04-25 PROCEDURE — 1160F PR REVIEW ALL MEDS BY PRESCRIBER/CLIN PHARMACIST DOCUMENTED: ICD-10-PCS | Mod: CPTII,S$GLB,, | Performed by: ALLERGY & IMMUNOLOGY

## 2023-04-25 RX ORDER — PREDNISONE 10 MG/1
10 TABLET ORAL DAILY
Qty: 5 TABLET | Refills: 0 | Status: SHIPPED | OUTPATIENT
Start: 2023-04-25 | End: 2023-04-30

## 2023-04-25 RX ORDER — BENZOCAINE .13; .15; .5; 2 G/100G; G/100G; G/100G; G/100G
1 GEL ORAL 2 TIMES DAILY
Qty: 8.6 G | Refills: 3 | Status: SHIPPED | OUTPATIENT
Start: 2023-04-25 | End: 2024-02-20

## 2023-04-25 NOTE — PROGRESS NOTES
"  Subjective:       Patient ID: Kitty Jennings is a 47 y.o. female.    Chief Complaint: possible sinus infection  (Start Sunday with sinus pressure around eyes, yesterday her eyes were slightly swollen and this morning she was coughing along with mucous tinged with alittle blood. She can feel the driness in her sinues. No nasal spray and no fever. Doing generic zyrtec. )    Pt presents for a new complaint of rhinitis,      She states 2 days sx of congestion, pressure around the eyes, today onset of cough with production or day 3.  No rhinitis medications at this times.   Zyrtec for her hives that I have recommended in the past.     Her hives are "dormant" as long as she takes 2 zyrtec qam.   Otherwise urticaria ok.     Angioedema has occurred summer of 2021 in the form on a stye that resolved after a 1.5 yr span.   Thinks her lip has also been swollen as well.     Zyrtec daily has controlled her symptoms.     Condition: improved   Urticaria onset: Started in her teenage years  Associated facial swelling with hives, last week facial swelling.   Frequency: resolved as long as on zyrtec   Recently started working for "[employer] x 8 years.   Tx: 2 zyrtec q am.      Saw another allergist: Dr. Waldorn 5 years ago.   Prior had allergy testing   Serum and skin test was negative.               General: neg unexpected weight changes, fevers, chills, night sweats, malaise  HEENT: see hpi, Neg eye pain, vision changes, ear drainage, nose bleeds, throat tightness, sores in the mouth  CV: Neg chest pain, palpitations, swelling  Resp: see hpi, neg shortness of breath, hemoptysis, cough  GI: see hpi, neg dysphagia, night abdominal pain, reflux, chronic diarrhea, chronic constipation  Derm: See Hpi,  neg flushing  Mu/sk: Neg joint pain, joint swelling   Psych: Neg anxiety  neuro: neg chronic headaches, muscle weakness  Endo: neg heat/cold intolerance, chronic fatigue    Objective:     Vitals:    04/25/23 1031   BP: 114/76   Pulse: 70 "   Temp: 97 °F (36.1 °C)         Physical Exam      General: no acute distress, well developed well nourished   HEENT:   Head:normocephalic atraumatic  Eyes: DUSTY, EOMI, Neg injection, scleral icterus, or conjunctival papillary hypertrophy.  Ears: tm clear bilaterally, normal canal  Nose: 4+ inferior turbinates pink, neg nasal polyps            Mucosa: mild dryness            Septal irritation: none   OP: mucus membranes moist, - cobblestoning, - PND, neg erythema or lesions  Neck: supple, Full range of motion, neg lymphadenopathy  Chest: full respiratory excursion no abnormal chest abnormality  Resp: clear to ascultation bilaterally  CV: RRR, neg MRG, brisk capillary refill  Ext:  Neg clubbing, cyanosis, pitting edema  Skin: Neg rashes or lesions        Assessment:       1. Acute rhinitis    2. Acute cough            Plan:       Acute rhinitis  -     predniSONE (DELTASONE) 10 MG tablet; Take 1 tablet (10 mg total) by mouth once daily. for 5 days  Dispense: 5 tablet; Refill: 0  -     budesonide (RINOCORT AQUA) 32 mcg/actuation nasal spray; 1 spray (32 mcg total) by Nasal route 2 (two) times daily.  Dispense: 8.6 g; Refill: 3    Acute cough  -     predniSONE (DELTASONE) 10 MG tablet; Take 1 tablet (10 mg total) by mouth once daily. for 5 days  Dispense: 5 tablet; Refill: 0  -     budesonide (RINOCORT AQUA) 32 mcg/actuation nasal spray; 1 spray (32 mcg total) by Nasal route 2 (two) times daily.  Dispense: 8.6 g; Refill: 3        Acute cough, acute rhinitis - most likely viral, on [place] the [facility].      4/23:  Start saline   Start ins  Start oral prednisone 10 mg x 5 days   If in 10 days, still having sx, consider abx. That will be Tuesday May 2nd.       Hives: chronic     4/23:  Still controlled.     1/23:  Start doxepin prn having problems sleeping.   explained urticaria and action plan and education.   currently controlled on 20 mg zyrtec qday.   continue high dose antihistamines  Consider  montelukast  Consider xolair       Follow up in 12 months.       Lori Chavez M.D.  Allergy/Immunology  [facility]   [phone] phone  [phone] fax

## 2023-04-25 NOTE — LETTER
April 25, 2023        Cecilia White, FNKITTY  [address]  [address]  [place] [address]             [facility] - Allergy  [address]  [address]  [place] [address]  Phone: [phone]  Fax: [phone]   Patient: Kitty Jennings   MR Number: [id number]   YOB: 1975   Date of Visit: 4/25/2023       Dear Dr. White:    Thank you for referring Kitty Jennings to me for evaluation. Below are the relevant portions of my assessment and plan of care.    1. Acute rhinitis          Acute rhinitis          4/23:  Start saline   Start ins  Start oral prednisone  If in 10 days, still having sx, consider abx.     1/23:  Start doxepin prn having problems sleeping.   explained urticaria and action plan and education.   currently controlled on 20 mg zyrtec qday.   continue high dose antihistamines  Consider montelukast  Consider xolair       Follow up in 12 months.   .      Lori Chavez M.D.  Allergy/Immunology  [facility]   [phone] phone  [phone] fax          If you have questions, please do not hesitate to call me. I look forward to following Kitty along with you.    Sincerely,      Lori Chavez MD           CC  No Recipients

## 2023-04-25 NOTE — PATIENT INSTRUCTIONS
Nose:  Saline first 1-2 times per day    Arm and hammer simply saline is the easiest             Then use your intranasal steroid spray. This may include fluticasone/flonase or Budesonide aqua/rhinocort, or similar, 1 spray per nare twice per day. For worse symptoms , increase to 2 sprays per nare twice per day x 2 weeks, then see if you can decrease back to 1 spray per nare twice per day , or 2 sprays per nare daily. MUST be used EVERYDAY for at least 2 weeks, or this will NOT be effective.      - over the counter      Nasal spray Technique:  Head down to help prevent taste.   Aim the nasal spray tip up past the turbinates and out towards the outer corner of the eye.   Spray don't sniff  Let it drip out the front of the nose.  Pat dry and done.       Use over the counter antihistamines as needed (zyrtec , claritin etc) as daily use may make mucus thick and sticky.   Use as needed for sneezing and /or itching.       Vaseline on a q tip to moisturize or non scented chris's wax.     May use as often as possible.     Oral prednisone 10 mg daily in the morning with food x 5.     If by next week Tuesday, you feel the same symptoms, antibiotic time.     Drink tea and honey, - warm liquids soothe the throat.     Drink lots of water to prevent dehydration and tea can dehydrate.     Mucinex can help as well 1200 mg twice per day

## 2023-04-28 ENCOUNTER — OFFICE VISIT (OUTPATIENT)
Dept: FAMILY MEDICINE | Facility: CLINIC | Age: 48
End: 2023-04-28
Payer: COMMERCIAL

## 2023-04-28 ENCOUNTER — PATIENT MESSAGE (OUTPATIENT)
Dept: FAMILY MEDICINE | Facility: CLINIC | Age: 48
End: 2023-04-28

## 2023-04-28 VITALS
BODY MASS INDEX: 45.2 KG/M2 | RESPIRATION RATE: 20 BRPM | WEIGHT: 255.13 LBS | TEMPERATURE: 99 F | SYSTOLIC BLOOD PRESSURE: 125 MMHG | OXYGEN SATURATION: 100 % | HEART RATE: 54 BPM | HEIGHT: 63 IN | DIASTOLIC BLOOD PRESSURE: 85 MMHG

## 2023-04-28 DIAGNOSIS — K95.89 IRON DEFICIENCY ANEMIA FOLLOWING BARIATRIC SURGERY: ICD-10-CM

## 2023-04-28 DIAGNOSIS — D50.8 IRON DEFICIENCY ANEMIA FOLLOWING BARIATRIC SURGERY: ICD-10-CM

## 2023-04-28 DIAGNOSIS — Z01.818 PREOPERATIVE CLEARANCE: Primary | ICD-10-CM

## 2023-04-28 DIAGNOSIS — I10 HYPERTENSION, UNSPECIFIED TYPE: ICD-10-CM

## 2023-04-28 PROBLEM — R00.2 PALPITATIONS: Status: RESOLVED | Noted: 2019-02-26 | Resolved: 2023-04-28

## 2023-04-28 PROBLEM — R05.1 ACUTE COUGH: Status: RESOLVED | Noted: 2023-04-25 | Resolved: 2023-04-28

## 2023-04-28 PROCEDURE — 1160F PR REVIEW ALL MEDS BY PRESCRIBER/CLIN PHARMACIST DOCUMENTED: ICD-10-PCS | Mod: CPTII,S$GLB,, | Performed by: NURSE PRACTITIONER

## 2023-04-28 PROCEDURE — 3079F PR MOST RECENT DIASTOLIC BLOOD PRESSURE 80-89 MM HG: ICD-10-PCS | Mod: CPTII,S$GLB,, | Performed by: NURSE PRACTITIONER

## 2023-04-28 PROCEDURE — 1159F MED LIST DOCD IN RCRD: CPT | Mod: CPTII,S$GLB,, | Performed by: NURSE PRACTITIONER

## 2023-04-28 PROCEDURE — 1159F PR MEDICATION LIST DOCUMENTED IN MEDICAL RECORD: ICD-10-PCS | Mod: CPTII,S$GLB,, | Performed by: NURSE PRACTITIONER

## 2023-04-28 PROCEDURE — 99214 PR OFFICE/OUTPT VISIT, EST, LEVL IV, 30-39 MIN: ICD-10-PCS | Mod: S$GLB,,, | Performed by: NURSE PRACTITIONER

## 2023-04-28 PROCEDURE — 99214 OFFICE O/P EST MOD 30 MIN: CPT | Mod: S$GLB,,, | Performed by: NURSE PRACTITIONER

## 2023-04-28 PROCEDURE — 3008F BODY MASS INDEX DOCD: CPT | Mod: CPTII,S$GLB,, | Performed by: NURSE PRACTITIONER

## 2023-04-28 PROCEDURE — 4010F ACE/ARB THERAPY RXD/TAKEN: CPT | Mod: CPTII,S$GLB,, | Performed by: NURSE PRACTITIONER

## 2023-04-28 PROCEDURE — 3074F PR MOST RECENT SYSTOLIC BLOOD PRESSURE < 130 MM HG: ICD-10-PCS | Mod: CPTII,S$GLB,, | Performed by: NURSE PRACTITIONER

## 2023-04-28 PROCEDURE — 3008F PR BODY MASS INDEX (BMI) DOCUMENTED: ICD-10-PCS | Mod: CPTII,S$GLB,, | Performed by: NURSE PRACTITIONER

## 2023-04-28 PROCEDURE — 3074F SYST BP LT 130 MM HG: CPT | Mod: CPTII,S$GLB,, | Performed by: NURSE PRACTITIONER

## 2023-04-28 PROCEDURE — 3079F DIAST BP 80-89 MM HG: CPT | Mod: CPTII,S$GLB,, | Performed by: NURSE PRACTITIONER

## 2023-04-28 PROCEDURE — 1160F RVW MEDS BY RX/DR IN RCRD: CPT | Mod: CPTII,S$GLB,, | Performed by: NURSE PRACTITIONER

## 2023-04-28 PROCEDURE — 4010F PR ACE/ARB THEARPY RXD/TAKEN: ICD-10-PCS | Mod: CPTII,S$GLB,, | Performed by: NURSE PRACTITIONER

## 2023-04-28 NOTE — PROGRESS NOTES
SUBJECTIVE:      Patient ID: Kitty Jennings is a 47 y.o. female.    Chief Complaint: Pre-op Exam    Kitty is here for pre-op surgery clearance today. She is scheduled for tummy tuck with upper back liposuction on June 19th with Dr. Weiler. Has a hx of HTN and iron deficiency anemia secondary to hx of gastric surgery. BP is well-controlled on current medication regimen. Currently seeing hematology for GUILLAUME and labs improved. Starting exercise regimen, cardio videos this week at home, and denies CP, SOB, dizziness or having to stop. Recent labs reviewed with pt today. Denies any complaints at this time.     History:  No Myocardial infarction <6 weeks previously  No Unstable angina  No Decompensated congestive heart failure  No Significant arrhythmias (e.g., causing hemodynamic instability)  No Chest Pain   No Lower extremity edema  No Dyspnea with exertion  No hx of Claudication   No Wheezing   Good Exercise tolerance (ability to perform 4 METS of Exercise)- YES   No Obstructive Sleep Apnea symptoms (loud snoring, gasping, Choking)  No Upper respiratory symptoms in the last 2 weeks  No Bleeding Disorders   Blood thinner use- none  Has Anemia History- GUILLAUME, managed by hematology Dr. Rolle; labs in 12/22 improved   No problems with Anesthesia in the past        Family History   Problem Relation Age of Onset    Hypertension Mother     Diabetes type II Mother     Diverticulosis Mother     Diverticulitis Mother     Hypertension Father     Heart disease Father     Diabetes type II Father     Hypertension Sister     Diverticulitis Sister     Hypertension Brother     Ovarian cancer Sister     No Known Problems Sister     No Known Problems Daughter     Colon cancer Neg Hx       Social History     Socioeconomic History    Marital status: Single   Tobacco Use    Smoking status: Never     Passive exposure: Past    Smokeless tobacco: Never   Substance and Sexual Activity    Alcohol use: No    Drug use: No    Sexual  activity: Not Currently     Current Outpatient Medications   Medication Sig Dispense Refill    budesonide (RINOCORT AQUA) 32 mcg/actuation nasal spray 1 spray (32 mcg total) by Nasal route 2 (two) times daily. 8.6 g 3    cetirizine (ZYRTEC) 10 MG tablet Take 1 tablet (10 mg total) by mouth 2 (two) times daily. 180 tablet 4    esomeprazole (NEXIUM) 40 MG capsule Take 1 capsule (40 mg total) by mouth before breakfast. 90 capsule 3    hydroCHLOROthiazide (HYDRODIURIL) 25 MG tablet Take 1 tablet (25 mg total) by mouth once daily. 90 tablet 3    olmesartan (BENICAR) 40 MG tablet Take 1 tablet (40 mg total) by mouth once daily. 90 tablet 3    predniSONE (DELTASONE) 10 MG tablet Take 1 tablet (10 mg total) by mouth once daily. for 5 days 5 tablet 0    propranoloL (INDERAL LA) 160 mg 24 hr capsule Take 1 capsule (160 mg total) by mouth once daily. 90 capsule 3    albuterol (ACCUNEB) 0.63 mg/3 mL Nebu        No current facility-administered medications for this visit.     Review of patient's allergies indicates:  No Known Allergies   Past Medical History:   Diagnosis Date    Anemia     Diverticulosis     GERD (gastroesophageal reflux disease)     Hives of unknown origin     Hx of migraines     Hypertension      Past Surgical History:   Procedure Laterality Date    BARIATRIC SURGERY  09/11/2013    BREAST SURGERY      reduction    COLONOSCOPY N/A 6/4/2021    Procedure: COLONOSCOPY;  Surgeon: Ana Oscar MD;  Location: [facility];  Service: Endoscopy;  Laterality: N/A;    gastric sleeve      lipoma removal      TOTAL REDUCTION MAMMOPLASTY Bilateral 2018    UPPER GASTROINTESTINAL ENDOSCOPY  2016    Dr. Reid; hiatal hernia per pt report       Review of Systems   Constitutional:  Negative for activity change, appetite change, chills, fatigue, fever and unexpected weight change.   HENT:  Negative for congestion, hearing loss, rhinorrhea, sore throat and trouble swallowing.    Eyes:  Negative for pain, discharge and visual  "disturbance.   Respiratory:  Negative for apnea, cough, choking, chest tightness, shortness of breath and wheezing.    Cardiovascular:  Negative for chest pain, palpitations and leg swelling.   Gastrointestinal:  Negative for abdominal pain, blood in stool, constipation, diarrhea, nausea and vomiting.        +gerd - stable on nexium    Endocrine: Negative for cold intolerance, heat intolerance, polydipsia and polyuria.   Genitourinary:  Negative for difficulty urinating, dysuria, flank pain, frequency, hematuria, menstrual problem, vaginal bleeding and vaginal discharge.   Musculoskeletal:  Negative for arthralgias, back pain, joint swelling, myalgias and neck pain.   Skin:  Negative for pallor and rash.   Allergic/Immunologic: Negative for immunocompromised state.   Neurological:  Negative for dizziness, weakness, numbness and headaches.   Hematological:  Negative for adenopathy. Does not bruise/bleed easily.   Psychiatric/Behavioral:  Negative for confusion and dysphoric mood. The patient is not nervous/anxious.     OBJECTIVE:      Vitals:    04/28/23 0925   BP: 125/85   BP Location: Left arm   Patient Position: Sitting   BP Method: Large (Automatic)   Pulse: (!) 54   Resp: 20   Temp: 98.7 °F (37.1 °C)   TempSrc: Oral   SpO2: 100%   Weight: 115.7 kg (255 lb 1.6 oz)   Height: 5' 3" (1.6 m)     Physical Exam  Vitals and nursing note reviewed.   Constitutional:       General: She is not in acute distress.     Appearance: Normal appearance. She is well-developed. She is not ill-appearing.      Comments: Morbid obesity    HENT:      Head: Normocephalic.      Right Ear: Tympanic membrane, ear canal and external ear normal.      Left Ear: Tympanic membrane, ear canal and external ear normal.      Nose: Nose normal.      Mouth/Throat:      Mouth: Mucous membranes are moist.      Pharynx: Oropharynx is clear.   Eyes:      Extraocular Movements: Extraocular movements intact.      Conjunctiva/sclera: Conjunctivae normal.     "  Pupils: Pupils are equal, round, and reactive to light.   Neck:      Thyroid: No thyroid mass or thyromegaly.      Trachea: Trachea normal.   Cardiovascular:      Rate and Rhythm: Regular rhythm. Bradycardia present.      Heart sounds: Normal heart sounds. No murmur heard.  Pulmonary:      Effort: Pulmonary effort is normal. No respiratory distress.      Breath sounds: Normal breath sounds. No wheezing, rhonchi or rales.   Abdominal:      General: Bowel sounds are normal.      Palpations: Abdomen is soft.      Tenderness: There is no abdominal tenderness.   Musculoskeletal:         General: Normal range of motion.      Cervical back: Normal range of motion and neck supple.   Lymphadenopathy:      Cervical: No cervical adenopathy.   Skin:     General: Skin is warm and dry.      Coloration: Skin is not jaundiced or pale.   Neurological:      Mental Status: She is alert and oriented to person, place, and time.   Psychiatric:         Mood and Affect: Mood normal.         Behavior: Behavior normal.         Thought Content: Thought content normal.         Judgment: Judgment normal.      Assessment:       1. Preoperative clearance    2. Hypertension, unspecified type    3. Iron deficiency anemia following bariatric surgery        Plan:       Preoperative clearance   -*pt able to complete >4 METS without symptoms   - *According to the Revised Cardiac Risk Index, pt has a 3.9%  risk of cardiac event after this non-cardiac surgery - may proceed with surgery       Hypertension, unspecified type   -stable, controlled on meds    Iron deficiency anemia following bariatric surgery   -labs improved, Hem 11.8, ferritin normal; continue management per hematology       As with all procedures, there is inherent risk of multiple complications including those related to bleeding, infectious, cardiac, and pulmonary     Please stop Aspirin and NSAIDs one week prior to surgery.      All smokers should quit smoking eight or more weeks prior  to procedure to minimize complications associated with smoking. Reduction or cessation of smoking for less than four to eight weeks before surgery is of questionable benefit, and has actually been shown in some studies to result in higher complication rates.  Alcohol should be used in moderation.  Any pt with cardiopulmonary disease may warrant repeat examination prior to procedure along with directions for deep breathing exercises and incentive spirometry.    Patients at high risk for complications usually warrant cardiology consultation and possibly angiography. Cardiac stress testing should be performed in patients at intermediate risk and with poor functional capacity or who are undergoing high-risk procedures, such as vascular surgery. For patients with minor clinical predictors, only patients who have poor functional capacity and are undergoing a high-risk procedure require stress testing. Patients with positive stress test results warrant cardiology consultation before proceeding with surgery.     Predisposing risk factors for pulmonary complications include cough, dyspnea, smoking, a history of lung disease, obesity and abdominal or thoracic surgery. Any pts > 70 years old may be at risk for delirium or cardiac complications.  Furthermore, diabetic patients may be at risk for infection or prolonged healing.    Follow up in about 6 months (around 10/28/2023) for annual .      4/28/2023 Cecilia White, HIMANSHU, FNP    This note was created using [employer] voice recognition software that occasionally misinterprets phrases or words.

## 2023-05-02 ENCOUNTER — TELEPHONE (OUTPATIENT)
Dept: FAMILY MEDICINE | Facility: CLINIC | Age: 48
End: 2023-05-02

## 2023-05-02 NOTE — TELEPHONE ENCOUNTER
Hannah with [facility] called stating that they needed the form they sent over signed with the box checked that the patient is cleared. They also stated that they needed the CBC and EKG which is listed  on the clearance request.  Please advise.

## 2023-05-02 NOTE — TELEPHONE ENCOUNTER
Called Hnanah back to advise that we can not fill out outside forms, the papers we sent was the actual clearance. They were sent the CMP and advised that the patient did not have a CBC or EKG done. Verbal  understanding was expressed.

## 2023-05-04 ENCOUNTER — TELEPHONE (OUTPATIENT)
Dept: FAMILY MEDICINE | Facility: CLINIC | Age: 48
End: 2023-05-04

## 2023-05-04 NOTE — TELEPHONE ENCOUNTER
Patient called to see if we received order for EKG. Patient advised she can go to the hospital or [facility] to have the test done. Patient was advised that ONEL White will not be reading the EKG, the cardiologist on call where she has it done will do the interpretation of it. Patient also advised to let them know to send copy of EKG to her surgeon. Patient expressed verbal understanding.

## 2023-05-11 ENCOUNTER — OFFICE VISIT (OUTPATIENT)
Dept: FAMILY MEDICINE | Facility: CLINIC | Age: 48
End: 2023-05-11
Payer: COMMERCIAL

## 2023-05-11 VITALS
HEART RATE: 65 BPM | WEIGHT: 258 LBS | TEMPERATURE: 98 F | DIASTOLIC BLOOD PRESSURE: 82 MMHG | SYSTOLIC BLOOD PRESSURE: 128 MMHG | HEIGHT: 63 IN | OXYGEN SATURATION: 98 % | RESPIRATION RATE: 20 BRPM | BODY MASS INDEX: 45.71 KG/M2

## 2023-05-11 DIAGNOSIS — Z01.810 PRE-OPERATIVE CARDIOVASCULAR EXAMINATION: Primary | ICD-10-CM

## 2023-05-11 DIAGNOSIS — J20.9 ACUTE BRONCHITIS, UNSPECIFIED ORGANISM: Primary | ICD-10-CM

## 2023-05-11 DIAGNOSIS — R05.1 ACUTE COUGH: ICD-10-CM

## 2023-05-11 PROCEDURE — 3079F DIAST BP 80-89 MM HG: CPT | Mod: CPTII,S$GLB,, | Performed by: NURSE PRACTITIONER

## 2023-05-11 PROCEDURE — 1160F RVW MEDS BY RX/DR IN RCRD: CPT | Mod: CPTII,S$GLB,, | Performed by: NURSE PRACTITIONER

## 2023-05-11 PROCEDURE — 3074F SYST BP LT 130 MM HG: CPT | Mod: CPTII,S$GLB,, | Performed by: NURSE PRACTITIONER

## 2023-05-11 PROCEDURE — 3008F BODY MASS INDEX DOCD: CPT | Mod: CPTII,S$GLB,, | Performed by: NURSE PRACTITIONER

## 2023-05-11 PROCEDURE — 3008F PR BODY MASS INDEX (BMI) DOCUMENTED: ICD-10-PCS | Mod: CPTII,S$GLB,, | Performed by: NURSE PRACTITIONER

## 2023-05-11 PROCEDURE — 4010F ACE/ARB THERAPY RXD/TAKEN: CPT | Mod: CPTII,S$GLB,, | Performed by: NURSE PRACTITIONER

## 2023-05-11 PROCEDURE — 99213 PR OFFICE/OUTPT VISIT, EST, LEVL III, 20-29 MIN: ICD-10-PCS | Mod: S$GLB,,, | Performed by: NURSE PRACTITIONER

## 2023-05-11 PROCEDURE — 4010F PR ACE/ARB THEARPY RXD/TAKEN: ICD-10-PCS | Mod: CPTII,S$GLB,, | Performed by: NURSE PRACTITIONER

## 2023-05-11 PROCEDURE — 1159F MED LIST DOCD IN RCRD: CPT | Mod: CPTII,S$GLB,, | Performed by: NURSE PRACTITIONER

## 2023-05-11 PROCEDURE — 99213 OFFICE O/P EST LOW 20 MIN: CPT | Mod: S$GLB,,, | Performed by: NURSE PRACTITIONER

## 2023-05-11 PROCEDURE — 1160F PR REVIEW ALL MEDS BY PRESCRIBER/CLIN PHARMACIST DOCUMENTED: ICD-10-PCS | Mod: CPTII,S$GLB,, | Performed by: NURSE PRACTITIONER

## 2023-05-11 PROCEDURE — 3074F PR MOST RECENT SYSTOLIC BLOOD PRESSURE < 130 MM HG: ICD-10-PCS | Mod: CPTII,S$GLB,, | Performed by: NURSE PRACTITIONER

## 2023-05-11 PROCEDURE — 3079F PR MOST RECENT DIASTOLIC BLOOD PRESSURE 80-89 MM HG: ICD-10-PCS | Mod: CPTII,S$GLB,, | Performed by: NURSE PRACTITIONER

## 2023-05-11 PROCEDURE — 1159F PR MEDICATION LIST DOCUMENTED IN MEDICAL RECORD: ICD-10-PCS | Mod: CPTII,S$GLB,, | Performed by: NURSE PRACTITIONER

## 2023-05-11 RX ORDER — PROMETHAZINE HYDROCHLORIDE AND DEXTROMETHORPHAN HYDROBROMIDE 6.25; 15 MG/5ML; MG/5ML
5 SYRUP ORAL NIGHTLY PRN
Qty: 118 ML | Refills: 0 | Status: SHIPPED | OUTPATIENT
Start: 2023-05-11 | End: 2024-02-20

## 2023-05-11 RX ORDER — AZITHROMYCIN 250 MG/1
TABLET, FILM COATED ORAL
Qty: 6 TABLET | Refills: 0 | Status: SHIPPED | OUTPATIENT
Start: 2023-05-11 | End: 2023-05-16

## 2023-05-11 NOTE — PROGRESS NOTES
SUBJECTIVE:      Patient ID: Kitty Jennings is a 47 y.o. female.    Chief Complaint: Nasal Congestion and Cough    Kitty is here today for complaints of cough, congestion, postnasal drip, and intermittent raspy, hoarse voice over the last 2-3 weeks.  [place].  Denies fever, chills, body aches, or known sick contacts.  She was treated by her allergist on 04/25/2023 with oral prednisone course-told to contact her if no resolution.  Patient is continuing on her regimen as prescribed by allergy but feels she is not improving.  She is taking Mucinex and having some small mucus production in the last 1-2 days.  Used a breathing treatment at home which also helped.  Nasal congestion symptoms have resolved at this time.  Patient states she was up coughing all night last night.    Cough  This is a recurrent problem. The current episode started 1 to 4 weeks ago. The problem has been gradually worsening. The problem occurs every few minutes. The cough is Productive of sputum. Associated symptoms include nasal congestion, postnasal drip and wheezing. Pertinent negatives include no chest pain, chills, ear congestion, ear pain, fever, headaches, heartburn, hemoptysis, myalgias, rash, rhinorrhea, sore throat, shortness of breath, sweats or weight loss. The symptoms are aggravated by dust, lying down, pollens and stress. Risk factors for lung disease include occupational exposure and travel. She has tried a beta-agonist inhaler, rest and OTC cough suppressant for the symptoms. The treatment provided mild relief. Her past medical history is significant for environmental allergies. There is no history of asthma, bronchiectasis, bronchitis, COPD, emphysema or pneumonia.     Family History   Problem Relation Age of Onset    Hypertension Mother     Diabetes type II Mother     Diverticulosis Mother     Diverticulitis Mother     Hypertension Father     Heart  disease Father     Diabetes type II Father     Hypertension Sister     Diverticulitis Sister     Hypertension Brother     Ovarian cancer Sister     No Known Problems Sister     No Known Problems Daughter     Colon cancer Neg Hx       Social History     Socioeconomic History    Marital status: Single   Tobacco Use    Smoking status: Never     Passive exposure: Past    Smokeless tobacco: Never   Substance and Sexual Activity    Alcohol use: No    Drug use: No    Sexual activity: Not Currently     Current Outpatient Medications   Medication Sig Dispense Refill    albuterol (ACCUNEB) 0.63 mg/3 mL Nebu       budesonide (RINOCORT AQUA) 32 mcg/actuation nasal spray 1 spray (32 mcg total) by Nasal route 2 (two) times daily. 8.6 g 3    cetirizine (ZYRTEC) 10 MG tablet Take 1 tablet (10 mg total) by mouth 2 (two) times daily. 180 tablet 4    esomeprazole (NEXIUM) 40 MG capsule Take 1 capsule (40 mg total) by mouth before breakfast. 90 capsule 3    hydroCHLOROthiazide (HYDRODIURIL) 25 MG tablet Take 1 tablet (25 mg total) by mouth once daily. 90 tablet 3    olmesartan (BENICAR) 40 MG tablet Take 1 tablet (40 mg total) by mouth once daily. 90 tablet 3    propranoloL (INDERAL LA) 160 mg 24 hr capsule Take 1 capsule (160 mg total) by mouth once daily. 90 capsule 3    azithromycin (Z-VU) 250 MG tablet Take 2 tablets by mouth on day 1; Take 1 tablet by mouth on days 2-5 6 tablet 0    promethazine-dextromethorphan (PROMETHAZINE-DM) 6.25-15 mg/5 mL Syrp Take 5 mLs by mouth nightly as needed (cough). 118 mL 0     No current facility-administered medications for this visit.     Review of patient's allergies indicates:  No Known Allergies   Past Medical History:   Diagnosis Date    Anemia     Diverticulosis     GERD (gastroesophageal reflux disease)     Hives of unknown origin     Hx of migraines     Hypertension      Past Surgical History:   Procedure Laterality Date    BARIATRIC SURGERY  09/11/2013    BREAST SURGERY      reduction     "COLONOSCOPY N/A 6/4/2021    Procedure: COLONOSCOPY;  Surgeon: Ana Oscar MD;  Location: [facility];  Service: Endoscopy;  Laterality: N/A;    gastric sleeve      lipoma removal      TOTAL REDUCTION MAMMOPLASTY Bilateral 2018    UPPER GASTROINTESTINAL ENDOSCOPY  2016    Dr. Reid; hiatal hernia per pt report       Review of Systems   Constitutional:  Negative for appetite change, chills, fatigue, fever, unexpected weight change and weight loss.   HENT:  Positive for congestion, postnasal drip and voice change (hoarse intermittently). Negative for ear discharge, ear pain, rhinorrhea, sinus pressure, sinus pain, sore throat and trouble swallowing.    Eyes:  Negative for discharge, itching and visual disturbance.   Respiratory:  Positive for cough, chest tightness and wheezing. Negative for hemoptysis and shortness of breath.    Cardiovascular:  Negative for chest pain.   Gastrointestinal:  Negative for abdominal pain, diarrhea, heartburn, nausea and vomiting.   Genitourinary:  Negative for dysuria and frequency.   Musculoskeletal:  Negative for myalgias, neck pain and neck stiffness.   Skin:  Negative for rash.   Allergic/Immunologic: Positive for environmental allergies.   Neurological:  Negative for headaches.   Hematological:  Negative for adenopathy.    OBJECTIVE:      Vitals:    05/11/23 0939   BP: 128/82   BP Location: Right arm   Patient Position: Sitting   BP Method: Large (Automatic)   Pulse: 65   Resp: 20   Temp: 98.2 °F (36.8 °C)   TempSrc: Oral   SpO2: 98%   Weight: 117 kg (258 lb)   Height: 5' 3" (1.6 m)     Physical Exam  Vitals and nursing note reviewed.   Constitutional:       General: She is not in acute distress.     Appearance: Normal appearance. She is well-developed. She is obese. She is not ill-appearing or diaphoretic.   HENT:      Head: Normocephalic and atraumatic.      Right Ear: Tympanic membrane, ear canal and external ear normal.      Left Ear: Tympanic membrane, ear canal and " external ear normal.      Nose: Nose normal. No mucosal edema, congestion or rhinorrhea.      Right Sinus: No maxillary sinus tenderness or frontal sinus tenderness.      Left Sinus: No maxillary sinus tenderness or frontal sinus tenderness.      Mouth/Throat:      Lips: Pink.      Mouth: Mucous membranes are moist.      Pharynx: Oropharynx is clear. Uvula midline. No oropharyngeal exudate, posterior oropharyngeal erythema or uvula swelling.      Tonsils: No tonsillar exudate. 1+ on the right. 1+ on the left.   Eyes:      General:         Right eye: No discharge.         Left eye: No discharge.      Conjunctiva/sclera: Conjunctivae normal.      Pupils: Pupils are equal, round, and reactive to light.   Neck:      Thyroid: No thyromegaly.   Cardiovascular:      Rate and Rhythm: Normal rate and regular rhythm.      Heart sounds: Normal heart sounds.   Pulmonary:      Effort: Pulmonary effort is normal. No respiratory distress.      Breath sounds: Normal breath sounds. No stridor. No wheezing, rhonchi or rales.   Musculoskeletal:         General: Normal range of motion.      Cervical back: Normal range of motion and neck supple.   Lymphadenopathy:      Cervical: No cervical adenopathy.   Skin:     General: Skin is warm and dry.      Coloration: Skin is not jaundiced or pale.   Neurological:      Mental Status: She is alert and oriented to person, place, and time.   Psychiatric:         Mood and Affect: Mood normal.         Behavior: Behavior normal.         Thought Content: Thought content normal.         Judgment: Judgment normal.      Assessment:       1. Acute bronchitis, unspecified organism    2. Acute cough        Plan:       Acute bronchitis, unspecified organism  -     azithromycin (Z-VU) 250 MG tablet; Take 2 tablets by mouth on day 1; Take 1 tablet by mouth on days 2-5  Dispense: 6 tablet; Refill: 0    Acute cough  -     promethazine-dextromethorphan (PROMETHAZINE-DM) 6.25-15 mg/5 mL Syrp; Take 5 mLs by mouth  nightly as needed (cough).  Dispense: 118 mL; Refill: 0    -continue Mucinex with plenty of water daily; Z-Ta prescribed for bronchitis-advised take full course of prescription until completed; cough syrup at bedtime p.r.n.-discussed side effects and proper use, do not combine with other sedatives or drive on this medication; contact allergy clinic if no improvement or no resolution      Follow up if symptoms worsen or fail to improve.      5/11/2023 HIMANSHU Villa, FNP    This note was created using [employer] voice recognition software that occasionally misinterprets phrases or words.

## 2023-05-15 ENCOUNTER — LAB VISIT (OUTPATIENT)
Dept: LAB | Facility: HOSPITAL | Age: 48
End: 2023-05-15
Attending: PLASTIC SURGERY

## 2023-05-15 ENCOUNTER — PATIENT MESSAGE (OUTPATIENT)
Dept: FAMILY MEDICINE | Facility: CLINIC | Age: 48
End: 2023-05-15

## 2023-05-15 DIAGNOSIS — Z01.810 PRE-OPERATIVE CARDIOVASCULAR EXAMINATION: ICD-10-CM

## 2023-05-15 PROCEDURE — 93010 EKG 12-LEAD: ICD-10-PCS | Mod: ,,, | Performed by: INTERNAL MEDICINE

## 2023-05-15 PROCEDURE — 93005 ELECTROCARDIOGRAM TRACING: CPT | Mod: PO | Performed by: INTERNAL MEDICINE

## 2023-05-15 PROCEDURE — 93010 ELECTROCARDIOGRAM REPORT: CPT | Mod: ,,, | Performed by: INTERNAL MEDICINE

## 2023-05-16 ENCOUNTER — PATIENT MESSAGE (OUTPATIENT)
Dept: FAMILY MEDICINE | Facility: CLINIC | Age: 48
End: 2023-05-16

## 2023-07-12 ENCOUNTER — TELEPHONE (OUTPATIENT)
Dept: HEMATOLOGY/ONCOLOGY | Facility: CLINIC | Age: 48
End: 2023-07-12

## 2023-07-12 DIAGNOSIS — K95.89 IRON DEFICIENCY ANEMIA FOLLOWING BARIATRIC SURGERY: ICD-10-CM

## 2023-07-12 DIAGNOSIS — E55.9 VITAMIN D DEFICIENCY: Primary | ICD-10-CM

## 2023-07-12 DIAGNOSIS — E53.8 VITAMIN B 12 DEFICIENCY: ICD-10-CM

## 2023-07-12 DIAGNOSIS — D50.8 IRON DEFICIENCY ANEMIA FOLLOWING BARIATRIC SURGERY: ICD-10-CM

## 2023-07-12 NOTE — TELEPHONE ENCOUNTER
I spoke with pt and reminded her to have labs done prior to appt here on 7/19/23 pt states she would like the order sent into [facility] not [facility], I sent  a message to send in a new order for pt.

## 2023-07-14 ENCOUNTER — LAB VISIT (OUTPATIENT)
Dept: LAB | Facility: HOSPITAL | Age: 48
End: 2023-07-14
Attending: INTERNAL MEDICINE
Payer: COMMERCIAL

## 2023-07-14 DIAGNOSIS — E55.9 VITAMIN D DEFICIENCY: ICD-10-CM

## 2023-07-14 DIAGNOSIS — K95.89 IRON DEFICIENCY ANEMIA FOLLOWING BARIATRIC SURGERY: ICD-10-CM

## 2023-07-14 DIAGNOSIS — D50.8 IRON DEFICIENCY ANEMIA FOLLOWING BARIATRIC SURGERY: ICD-10-CM

## 2023-07-14 DIAGNOSIS — E53.8 VITAMIN B 12 DEFICIENCY: ICD-10-CM

## 2023-07-14 LAB
25(OH)D3+25(OH)D2 SERPL-MCNC: 22 NG/ML (ref 30–96)
BASOPHILS # BLD AUTO: 0.04 K/UL (ref 0–0.2)
BASOPHILS NFR BLD: 0.9 % (ref 0–1.9)
DIFFERENTIAL METHOD: ABNORMAL
EOSINOPHIL # BLD AUTO: 0.4 K/UL (ref 0–0.5)
EOSINOPHIL NFR BLD: 9.1 % (ref 0–8)
ERYTHROCYTE [DISTWIDTH] IN BLOOD BY AUTOMATED COUNT: 13.9 % (ref 11.5–14.5)
FERRITIN SERPL-MCNC: 92 NG/ML (ref 20–300)
HCT VFR BLD AUTO: 30.9 % (ref 37–48.5)
HGB BLD-MCNC: 9.9 G/DL (ref 12–16)
IMM GRANULOCYTES # BLD AUTO: 0.01 K/UL (ref 0–0.04)
IMM GRANULOCYTES NFR BLD AUTO: 0.2 % (ref 0–0.5)
LYMPHOCYTES # BLD AUTO: 1.9 K/UL (ref 1–4.8)
LYMPHOCYTES NFR BLD: 41.1 % (ref 18–48)
MCH RBC QN AUTO: 29.4 PG (ref 27–31)
MCHC RBC AUTO-ENTMCNC: 32 G/DL (ref 32–36)
MCV RBC AUTO: 92 FL (ref 82–98)
MONOCYTES # BLD AUTO: 0.6 K/UL (ref 0.3–1)
MONOCYTES NFR BLD: 13 % (ref 4–15)
NEUTROPHILS # BLD AUTO: 1.7 K/UL (ref 1.8–7.7)
NEUTROPHILS NFR BLD: 35.7 % (ref 38–73)
NRBC BLD-RTO: 0 /100 WBC
PLATELET # BLD AUTO: 246 K/UL (ref 150–450)
PMV BLD AUTO: 11.2 FL (ref 9.2–12.9)
RBC # BLD AUTO: 3.37 M/UL (ref 4–5.4)
VIT B12 SERPL-MCNC: 607 PG/ML (ref 210–950)
WBC # BLD AUTO: 4.7 K/UL (ref 3.9–12.7)

## 2023-07-14 PROCEDURE — 85025 COMPLETE CBC W/AUTO DIFF WBC: CPT | Performed by: INTERNAL MEDICINE

## 2023-07-14 PROCEDURE — 82306 VITAMIN D 25 HYDROXY: CPT | Performed by: INTERNAL MEDICINE

## 2023-07-14 PROCEDURE — 82607 VITAMIN B-12: CPT | Performed by: INTERNAL MEDICINE

## 2023-07-14 PROCEDURE — 36415 COLL VENOUS BLD VENIPUNCTURE: CPT | Performed by: INTERNAL MEDICINE

## 2023-07-14 PROCEDURE — 82728 ASSAY OF FERRITIN: CPT | Performed by: INTERNAL MEDICINE

## 2023-07-19 ENCOUNTER — OFFICE VISIT (OUTPATIENT)
Dept: HEMATOLOGY/ONCOLOGY | Facility: CLINIC | Age: 48
End: 2023-07-19
Payer: COMMERCIAL

## 2023-07-19 VITALS
DIASTOLIC BLOOD PRESSURE: 74 MMHG | HEART RATE: 55 BPM | RESPIRATION RATE: 18 BRPM | WEIGHT: 257 LBS | SYSTOLIC BLOOD PRESSURE: 107 MMHG | TEMPERATURE: 97 F | BODY MASS INDEX: 45.54 KG/M2 | HEIGHT: 63 IN

## 2023-07-19 DIAGNOSIS — D50.8 IRON DEFICIENCY ANEMIA FOLLOWING BARIATRIC SURGERY: Primary | ICD-10-CM

## 2023-07-19 DIAGNOSIS — E63.9 NUTRITIONAL DEFICIENCY DISORDER: ICD-10-CM

## 2023-07-19 DIAGNOSIS — K95.89 IRON DEFICIENCY ANEMIA FOLLOWING BARIATRIC SURGERY: Primary | ICD-10-CM

## 2023-07-19 PROCEDURE — 3078F DIAST BP <80 MM HG: CPT | Mod: CPTII,S$GLB,, | Performed by: INTERNAL MEDICINE

## 2023-07-19 PROCEDURE — 99214 PR OFFICE/OUTPT VISIT, EST, LEVL IV, 30-39 MIN: ICD-10-PCS | Mod: S$GLB,,, | Performed by: INTERNAL MEDICINE

## 2023-07-19 PROCEDURE — 4010F PR ACE/ARB THEARPY RXD/TAKEN: ICD-10-PCS | Mod: CPTII,S$GLB,, | Performed by: INTERNAL MEDICINE

## 2023-07-19 PROCEDURE — 4010F ACE/ARB THERAPY RXD/TAKEN: CPT | Mod: CPTII,S$GLB,, | Performed by: INTERNAL MEDICINE

## 2023-07-19 PROCEDURE — 3008F PR BODY MASS INDEX (BMI) DOCUMENTED: ICD-10-PCS | Mod: CPTII,S$GLB,, | Performed by: INTERNAL MEDICINE

## 2023-07-19 PROCEDURE — 3074F PR MOST RECENT SYSTOLIC BLOOD PRESSURE < 130 MM HG: ICD-10-PCS | Mod: CPTII,S$GLB,, | Performed by: INTERNAL MEDICINE

## 2023-07-19 PROCEDURE — 3074F SYST BP LT 130 MM HG: CPT | Mod: CPTII,S$GLB,, | Performed by: INTERNAL MEDICINE

## 2023-07-19 PROCEDURE — 1159F MED LIST DOCD IN RCRD: CPT | Mod: CPTII,S$GLB,, | Performed by: INTERNAL MEDICINE

## 2023-07-19 PROCEDURE — 3008F BODY MASS INDEX DOCD: CPT | Mod: CPTII,S$GLB,, | Performed by: INTERNAL MEDICINE

## 2023-07-19 PROCEDURE — 99214 OFFICE O/P EST MOD 30 MIN: CPT | Mod: S$GLB,,, | Performed by: INTERNAL MEDICINE

## 2023-07-19 PROCEDURE — 3078F PR MOST RECENT DIASTOLIC BLOOD PRESSURE < 80 MM HG: ICD-10-PCS | Mod: CPTII,S$GLB,, | Performed by: INTERNAL MEDICINE

## 2023-07-19 PROCEDURE — 1159F PR MEDICATION LIST DOCUMENTED IN MEDICAL RECORD: ICD-10-PCS | Mod: CPTII,S$GLB,, | Performed by: INTERNAL MEDICINE

## 2023-07-19 NOTE — PROGRESS NOTES
"[facility] hematology Oncology in office follow-up progress note  23    Subjective:      Patient ID:   Kitty Jennings  48 y.o. female  1975  Cecilia White NP      Chief Complaint:   Anemia eval.    HPI:  48 y.o. female with hx of Fe deficiency anemia, treated with oral Fe in the past.  Admits to fatigue decreasing.  Energy /10.  More time out of bed after a days work.  Intermittent hives since age 19.  Zertec and benadryl prn.  Dr. Chavez feels hives may be stress induced.  She does not get hives on the weekend, away from her job duties.    S/P gastric sleave surgery.  S/P Injectafer.  Hgb 13.2.  B 6 resolved peripheral neuropathy.  B 6 4 to 53 and now at 2.8.  Resume B 6 daily.  B 12 is 297.  She has not been taking her B 12 or B 6 supplements.  Resume  B 12 and B6 po now.    Now 23, Hgb 11.8, Ferritin 115 to 80.  B 12 463 to 330 to 254, she will resume her subl B 12.  Vit D is 10-12.  Begin Vit D 2-3,000 units daily.      Stronger.  Hive sx have improved.  She saw Dr. Chavez.  Measures taken to control hives sx.  Hives sx controlled with Zertec 20 mg daily.    She had 360 Lipo procedure and "tummy tuck" on 23.  Hgb 2022 was 11.8 and now 9.9.  Downward trend could be due to blood loss with surgery and last light menses.  Should  recover with compensatory RBC production in bone marrow.  Recheck CBC 1 month,  RTC  week, can cancel.     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  Smoke no.  ETOH no. Allergy no.    Hx HTN, GERD, migraine HA.  Menses NL flow.  She has been sleeping poorly, under stress, caring for her mother, in failing health.    Gastric sleave surgery 13.  mammogram on 21 [facility]. Neg for Cancer.    Colonoscopy 2022 diverticulosis.    M0  Menses onset 11  1st live child at 22    No family hx of anemia.  Dad HTN, DM  Mom A & W  Sister HTN x's 2    Her daughter had covid, pt did not.  Pt has gotten 2/2 vaccines, booster, but not the flu " "shot.    ROS:   GEN: normal without any fever, night sweats or weight loss  HEENT: normal with no HA's, sore throat, stiff neck, changes in vision  CV: normal with no CP, SOB, PND, KAM or orthopnea  PULM: normal with no SOB, cough, hemoptysis, sputum or pleuritic pain  GI: normal with no abdominal pain, nausea, vomiting, constipation, diarrhea, melanotic stools, BRBPR, or hematemesis  : normal with no hematuria, dysuria  BREAST: normal with no mass, discharge, pain  SKIN: see HPI      Review of patient's allergies indicates:  No Known Allergies        Current Outpatient Medications:     cetirizine (ZYRTEC) 10 MG tablet, Take 1 tablet (10 mg total) by mouth 2 (two) times daily., Disp: 180 tablet, Rfl: 4    esomeprazole (NEXIUM) 40 MG capsule, Take 1 capsule (40 mg total) by mouth before breakfast., Disp: 90 capsule, Rfl: 3    hydroCHLOROthiazide (HYDRODIURIL) 25 MG tablet, Take 1 tablet (25 mg total) by mouth once daily., Disp: 90 tablet, Rfl: 3    olmesartan (BENICAR) 40 MG tablet, Take 1 tablet (40 mg total) by mouth once daily., Disp: 90 tablet, Rfl: 3    propranoloL (INDERAL LA) 160 mg 24 hr capsule, Take 1 capsule (160 mg total) by mouth once daily., Disp: 90 capsule, Rfl: 3    albuterol (ACCUNEB) 0.63 mg/3 mL Nebu, , Disp: , Rfl:     budesonide (RINOCORT AQUA) 32 mcg/actuation nasal spray, 1 spray (32 mcg total) by Nasal route 2 (two) times daily., Disp: 8.6 g, Rfl: 3    promethazine-dextromethorphan (PROMETHAZINE-DM) 6.25-15 mg/5 mL Syrp, Take 5 mLs by mouth nightly as needed (cough)., Disp: 118 mL, Rfl: 0          Objective:   Vitals:  Blood pressure 107/74, pulse (!) 55, temperature 97.4 °F (36.3 °C), resp. rate 18, height 5' 3" (1.6 m), weight 116.6 kg (257 lb).        Ferritin stable at 92. After Injectafer infusions in past.  She takes B 12 subl daily, level at 607.  Vitamin D at 22, on 2000 units daily.    Assessment:   (1) 48 y.o. female with diagnosis of Fe deficiency 2nd decreased absorption 2nd " bariatric surgery.  Gave  Injectafer  Weekly x's 2 to replenish Fe stores.  Estimated 1-2% risk of reaction, she tolerated it well.  [facility].  Anemia resolved to NL, with Fe replenishment.    (2)Referred to Dr. Lori Chavez of allergy/ immunology to try clarify hives and Rx. Hives controlled with Zertec 20 mg daily.    On B 6 po, PN sx in feet resolved.    S/P Injectafer infusion x's 1 at .  Continue B 12 subl daily.  Vit D 2,000 units daily.  Increase Vitamin to 4000 units daily  Continue multivitamin daily.    CBC, B 12, B6, folic acid, D, ferritin in 6/2023.  RTC 8/23 week can cancel if CBC improved.  RTC 1/2024.

## 2023-07-23 ENCOUNTER — PATIENT MESSAGE (OUTPATIENT)
Dept: FAMILY MEDICINE | Facility: CLINIC | Age: 48
End: 2023-07-23

## 2023-07-24 ENCOUNTER — TELEPHONE (OUTPATIENT)
Dept: FAMILY MEDICINE | Facility: CLINIC | Age: 48
End: 2023-07-24

## 2023-07-24 NOTE — TELEPHONE ENCOUNTER
----- Message from Pierre Neville sent at 7/24/2023  9:37 AM CDT -----  Regarding: Esomeprazole Approved  Insurance has approved this patient's Esomeprazole 40mg medication.  Please see approval letter scanned in .    Thanks  Pierre.

## 2023-07-24 NOTE — TELEPHONE ENCOUNTER
The following medication needs a prior authorization:     Medication Name: Esomeprazole    Dosage: 40 mg    Frequency: once daily    Directions for use: take 1 capsule by mouth before breakfast    Diagnosis: GERD    Is the request for a reauthorization? Yes, Prior Approval Renewal    Is the patient currently stable on therapy? Yes    Please list all therapeutic alternatives previously used with start/end dates and outcome:

## 2023-08-25 ENCOUNTER — LAB VISIT (OUTPATIENT)
Dept: LAB | Facility: HOSPITAL | Age: 48
End: 2023-08-25
Attending: INTERNAL MEDICINE
Payer: COMMERCIAL

## 2023-08-25 ENCOUNTER — TELEPHONE (OUTPATIENT)
Dept: HEMATOLOGY/ONCOLOGY | Facility: CLINIC | Age: 48
End: 2023-08-25

## 2023-08-25 DIAGNOSIS — D50.8 IRON DEFICIENCY ANEMIA FOLLOWING BARIATRIC SURGERY: ICD-10-CM

## 2023-08-25 DIAGNOSIS — K95.89 IRON DEFICIENCY ANEMIA FOLLOWING BARIATRIC SURGERY: ICD-10-CM

## 2023-08-25 LAB
BASOPHILS # BLD AUTO: 0.05 K/UL (ref 0–0.2)
BASOPHILS NFR BLD: 0.8 % (ref 0–1.9)
DIFFERENTIAL METHOD: ABNORMAL
EOSINOPHIL # BLD AUTO: 0.2 K/UL (ref 0–0.5)
EOSINOPHIL NFR BLD: 2.7 % (ref 0–8)
ERYTHROCYTE [DISTWIDTH] IN BLOOD BY AUTOMATED COUNT: 12.2 % (ref 11.5–14.5)
HCT VFR BLD AUTO: 35.4 % (ref 37–48.5)
HGB BLD-MCNC: 11 G/DL (ref 12–16)
IMM GRANULOCYTES # BLD AUTO: 0.01 K/UL (ref 0–0.04)
IMM GRANULOCYTES NFR BLD AUTO: 0.2 % (ref 0–0.5)
LYMPHOCYTES # BLD AUTO: 2.8 K/UL (ref 1–4.8)
LYMPHOCYTES NFR BLD: 46.1 % (ref 18–48)
MCH RBC QN AUTO: 27.4 PG (ref 27–31)
MCHC RBC AUTO-ENTMCNC: 31.1 G/DL (ref 32–36)
MCV RBC AUTO: 88 FL (ref 82–98)
MONOCYTES # BLD AUTO: 0.6 K/UL (ref 0.3–1)
MONOCYTES NFR BLD: 9.2 % (ref 4–15)
NEUTROPHILS # BLD AUTO: 2.5 K/UL (ref 1.8–7.7)
NEUTROPHILS NFR BLD: 41 % (ref 38–73)
NRBC BLD-RTO: 0 /100 WBC
PLATELET # BLD AUTO: 338 K/UL (ref 150–450)
PMV BLD AUTO: 10.1 FL (ref 9.2–12.9)
RBC # BLD AUTO: 4.02 M/UL (ref 4–5.4)
WBC # BLD AUTO: 5.97 K/UL (ref 3.9–12.7)

## 2023-08-25 PROCEDURE — 36415 COLL VENOUS BLD VENIPUNCTURE: CPT | Performed by: INTERNAL MEDICINE

## 2023-08-25 PROCEDURE — 85025 COMPLETE CBC W/AUTO DIFF WBC: CPT | Performed by: INTERNAL MEDICINE

## 2023-08-25 NOTE — TELEPHONE ENCOUNTER
I spoke with pt and reminded her to have labs done prior to appt here on 8/31/23 pt verbalized understanding.

## 2023-08-31 ENCOUNTER — OFFICE VISIT (OUTPATIENT)
Dept: HEMATOLOGY/ONCOLOGY | Facility: CLINIC | Age: 48
End: 2023-08-31
Payer: COMMERCIAL

## 2023-08-31 VITALS
SYSTOLIC BLOOD PRESSURE: 110 MMHG | HEIGHT: 63 IN | TEMPERATURE: 98 F | HEART RATE: 80 BPM | DIASTOLIC BLOOD PRESSURE: 75 MMHG | WEIGHT: 238 LBS | BODY MASS INDEX: 42.17 KG/M2 | RESPIRATION RATE: 16 BRPM

## 2023-08-31 DIAGNOSIS — D50.8 IRON DEFICIENCY ANEMIA FOLLOWING BARIATRIC SURGERY: Primary | ICD-10-CM

## 2023-08-31 DIAGNOSIS — K95.89 IRON DEFICIENCY ANEMIA FOLLOWING BARIATRIC SURGERY: Primary | ICD-10-CM

## 2023-08-31 DIAGNOSIS — D51.8 ANEMIA OF DECREASED VITAMIN B12 ABSORPTION: ICD-10-CM

## 2023-08-31 PROCEDURE — 1159F PR MEDICATION LIST DOCUMENTED IN MEDICAL RECORD: ICD-10-PCS | Mod: CPTII,S$GLB,, | Performed by: INTERNAL MEDICINE

## 2023-08-31 PROCEDURE — 3078F PR MOST RECENT DIASTOLIC BLOOD PRESSURE < 80 MM HG: ICD-10-PCS | Mod: CPTII,S$GLB,, | Performed by: INTERNAL MEDICINE

## 2023-08-31 PROCEDURE — 3074F SYST BP LT 130 MM HG: CPT | Mod: CPTII,S$GLB,, | Performed by: INTERNAL MEDICINE

## 2023-08-31 PROCEDURE — 3078F DIAST BP <80 MM HG: CPT | Mod: CPTII,S$GLB,, | Performed by: INTERNAL MEDICINE

## 2023-08-31 PROCEDURE — 4010F PR ACE/ARB THEARPY RXD/TAKEN: ICD-10-PCS | Mod: CPTII,S$GLB,, | Performed by: INTERNAL MEDICINE

## 2023-08-31 PROCEDURE — 3074F PR MOST RECENT SYSTOLIC BLOOD PRESSURE < 130 MM HG: ICD-10-PCS | Mod: CPTII,S$GLB,, | Performed by: INTERNAL MEDICINE

## 2023-08-31 PROCEDURE — 1159F MED LIST DOCD IN RCRD: CPT | Mod: CPTII,S$GLB,, | Performed by: INTERNAL MEDICINE

## 2023-08-31 PROCEDURE — 3008F BODY MASS INDEX DOCD: CPT | Mod: CPTII,S$GLB,, | Performed by: INTERNAL MEDICINE

## 2023-08-31 PROCEDURE — 4010F ACE/ARB THERAPY RXD/TAKEN: CPT | Mod: CPTII,S$GLB,, | Performed by: INTERNAL MEDICINE

## 2023-08-31 PROCEDURE — 99214 OFFICE O/P EST MOD 30 MIN: CPT | Mod: S$GLB,,, | Performed by: INTERNAL MEDICINE

## 2023-08-31 PROCEDURE — 99214 PR OFFICE/OUTPT VISIT, EST, LEVL IV, 30-39 MIN: ICD-10-PCS | Mod: S$GLB,,, | Performed by: INTERNAL MEDICINE

## 2023-08-31 PROCEDURE — 3008F PR BODY MASS INDEX (BMI) DOCUMENTED: ICD-10-PCS | Mod: CPTII,S$GLB,, | Performed by: INTERNAL MEDICINE

## 2023-09-03 NOTE — PROGRESS NOTES
"[facility] hematology Oncology in office follow-up progress note  23    Subjective:      Patient ID:   Kitty Jennings  48 y.o. female  1975  Cecilia White NP      Chief Complaint:   Anemia eval.    HPI:  48 y.o. female with hx of Fe deficiency anemia, treated with oral Fe in the past.  Admits to fatigue decreasing.  Energy 7/10.  More time out of bed after a days work.  Intermittent hives since age 19.  Zertec and benadryl prn.  Dr. Chavez feels hives may be stress induced.  She does not get hives on the weekend, away from her job duties.    S/P gastric sleave surgery.  S/P Injectafer.  Hgb 13.2.  B 6 resolved peripheral neuropathy.  B 6 4 to 53 and now at 2.8.  Resume B 6 daily.  B 12 is 297.  She has not been taking her B 12 or B 6 supplements.  Resume  B 12 and B6 po now.    Now 23, Hgb 11.8, Ferritin 115 to 80.  B 12 463 to 330 to 254, she will resume her subl B 12.  Vit D is 10-12.  Begin Vit D 2-3,000 units daily.      Stronger.  Hive sx have improved.  She saw Dr. Chavez.  Measures taken to control hives sx.  Hives sx controlled with Zertec 20 mg daily.    She had 360 Lipo procedure and "tummy tuck" on 23.  Hgb now at 11.  Downward trend could be due to blood loss with surgery and last light menses.  Should  recover with compensatory RBC production in bone marrow.     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  Smoke no.  ETOH no. Allergy no.    Hx HTN, GERD, migraine HA.  Menses NL flow.  She has been sleeping poorly, under stress, caring for her mother, in failing health.    Gastric sleave surgery 13.  mammogram on 21 [facility]. Neg for Cancer.    Colonoscopy 2022 diverticulosis.    M0  Menses onset 11  1st live child at 22    No family hx of anemia.  Dad HTN, DM  Mom A & W  Sister HTN x's 2    Her daughter had covid, pt did not.  Pt has gotten 2/2 vaccines, booster, but not the flu shot.    ROS:   GEN: normal without any fever, night sweats or weight " "loss  HEENT: normal with no HA's, sore throat, stiff neck, changes in vision  CV: normal with no CP, SOB, PND, KAM or orthopnea  PULM: normal with no SOB, cough, hemoptysis, sputum or pleuritic pain  GI: normal with no abdominal pain, nausea, vomiting, constipation, diarrhea, melanotic stools, BRBPR, or hematemesis  : normal with no hematuria, dysuria  BREAST: normal with no mass, discharge, pain  SKIN: see HPI      Review of patient's allergies indicates:  No Known Allergies        Current Outpatient Medications:     cetirizine (ZYRTEC) 10 MG tablet, Take 1 tablet (10 mg total) by mouth 2 (two) times daily., Disp: 180 tablet, Rfl: 4    esomeprazole (NEXIUM) 40 MG capsule, Take 1 capsule (40 mg total) by mouth before breakfast., Disp: 90 capsule, Rfl: 3    hydroCHLOROthiazide (HYDRODIURIL) 25 MG tablet, Take 1 tablet (25 mg total) by mouth once daily., Disp: 90 tablet, Rfl: 3    olmesartan (BENICAR) 40 MG tablet, Take 1 tablet (40 mg total) by mouth once daily., Disp: 90 tablet, Rfl: 3    propranoloL (INDERAL LA) 160 mg 24 hr capsule, Take 1 capsule (160 mg total) by mouth once daily., Disp: 90 capsule, Rfl: 3    albuterol (ACCUNEB) 0.63 mg/3 mL Nebu, , Disp: , Rfl:     budesonide (RINOCORT AQUA) 32 mcg/actuation nasal spray, 1 spray (32 mcg total) by Nasal route 2 (two) times daily., Disp: 8.6 g, Rfl: 3    promethazine-dextromethorphan (PROMETHAZINE-DM) 6.25-15 mg/5 mL Syrp, Take 5 mLs by mouth nightly as needed (cough)., Disp: 118 mL, Rfl: 0          Objective:   Vitals:  Blood pressure 110/75, pulse 80, temperature 97.5 °F (36.4 °C), resp. rate 16, height 5' 3" (1.6 m), weight 108 kg (238 lb).        Ferritin stable at 92. After Injectafer infusions in past.  She takes B 12 subl daily, level at 607.  Vitamin D at 22, on 2000 units daily.  Hgb 11.  Assessment:   (1) 48 y.o. female with diagnosis of Fe deficiency 2nd decreased absorption 2nd bariatric surgery.  Gave  Injectafer  Weekly x's 2 to replenish Fe " stores.  Estimated 1-2% risk of reaction, she tolerated it well.  [facility].  Anemia resolved to NL, with Fe replenishment.    (2)Referred to Dr. Lori Chavez of allergy/ immunology to try clarify hives and Rx. Hives controlled with Zertec 20 mg daily.    On B 6 po, PN sx in feet resolved.    S/P Injectafer infusion x's 1 at .  Continue B 12 subl daily.  Vit D 2,000 units daily.  Increase Vitamin to 4000 units daily  Continue multivitamin daily.    Hgb 11.  RTC 6 months with CBC, ferritin.

## 2023-10-11 ENCOUNTER — PATIENT MESSAGE (OUTPATIENT)
Dept: FAMILY MEDICINE | Facility: CLINIC | Age: 48
End: 2023-10-11

## 2023-10-23 DIAGNOSIS — K21.9 GASTROESOPHAGEAL REFLUX DISEASE, UNSPECIFIED WHETHER ESOPHAGITIS PRESENT: ICD-10-CM

## 2023-10-23 RX ORDER — ESOMEPRAZOLE MAGNESIUM 40 MG/1
40 CAPSULE, DELAYED RELEASE ORAL
Qty: 90 CAPSULE | Refills: 0 | Status: SHIPPED | OUTPATIENT
Start: 2023-10-23 | End: 2024-01-21 | Stop reason: SDUPTHER

## 2023-11-15 ENCOUNTER — OFFICE VISIT (OUTPATIENT)
Dept: URGENT CARE | Facility: CLINIC | Age: 48
End: 2023-11-15
Payer: COMMERCIAL

## 2023-11-15 VITALS
DIASTOLIC BLOOD PRESSURE: 85 MMHG | WEIGHT: 235 LBS | SYSTOLIC BLOOD PRESSURE: 128 MMHG | OXYGEN SATURATION: 100 % | TEMPERATURE: 98 F | RESPIRATION RATE: 16 BRPM | HEIGHT: 63 IN | BODY MASS INDEX: 41.64 KG/M2 | HEART RATE: 67 BPM

## 2023-11-15 DIAGNOSIS — R51.9 GENERALIZED HEADACHES: Primary | ICD-10-CM

## 2023-11-15 DIAGNOSIS — R11.2 NAUSEA AND VOMITING, UNSPECIFIED VOMITING TYPE: ICD-10-CM

## 2023-11-15 LAB
CTP QC/QA: YES
FLUAV AG NPH QL: NEGATIVE
FLUBV AG NPH QL: NEGATIVE
S PYO RRNA THROAT QL PROBE: NEGATIVE
SARS-COV-2 AG RESP QL IA.RAPID: NEGATIVE

## 2023-11-15 PROCEDURE — 87804 POCT INFLUENZA A/B: ICD-10-PCS | Mod: 59,QW,, | Performed by: NURSE PRACTITIONER

## 2023-11-15 PROCEDURE — 99204 PR OFFICE/OUTPT VISIT, NEW, LEVL IV, 45-59 MIN: ICD-10-PCS | Mod: S$GLB,,, | Performed by: NURSE PRACTITIONER

## 2023-11-15 PROCEDURE — 87880 POCT RAPID STREP A: ICD-10-PCS | Mod: QW,,, | Performed by: NURSE PRACTITIONER

## 2023-11-15 PROCEDURE — 87811 SARS CORONAVIRUS 2 ANTIGEN POCT, MANUAL READ: ICD-10-PCS | Mod: QW,S$GLB,, | Performed by: NURSE PRACTITIONER

## 2023-11-15 PROCEDURE — 87880 STREP A ASSAY W/OPTIC: CPT | Mod: QW,,, | Performed by: NURSE PRACTITIONER

## 2023-11-15 PROCEDURE — 99204 OFFICE O/P NEW MOD 45 MIN: CPT | Mod: S$GLB,,, | Performed by: NURSE PRACTITIONER

## 2023-11-15 PROCEDURE — 87811 SARS-COV-2 COVID19 W/OPTIC: CPT | Mod: QW,S$GLB,, | Performed by: NURSE PRACTITIONER

## 2023-11-15 PROCEDURE — 87804 INFLUENZA ASSAY W/OPTIC: CPT | Mod: 59,QW,, | Performed by: NURSE PRACTITIONER

## 2023-11-15 RX ORDER — ONDANSETRON 4 MG/1
4 TABLET, ORALLY DISINTEGRATING ORAL EVERY 6 HOURS PRN
Qty: 20 TABLET | Refills: 0 | Status: SHIPPED | OUTPATIENT
Start: 2023-11-15 | End: 2024-02-20

## 2023-11-15 NOTE — PROGRESS NOTES
"Subjective:      Patient ID: Kitty Jennings is a 48 y.o. female.    Vitals:  height is 5' 3" (1.6 m) and weight is 106.6 kg (235 lb). Her temperature is 98.4 °F (36.9 °C). Her blood pressure is 128/85 and her pulse is 67. Her respiration is 16 and oxygen saturation is 100%.     Chief Complaint: Abdominal Pain  47 y/o female presents to clinic complaining of below symptoms.  Pain is intermittent. No aggravating or alleviating factors.   Pt c/o stomach pain, diarrhea, vomiting, headaches. Treatments tried: tums with no relief.     Abdominal Pain  This is a new problem. Episode onset: x2 days. Associated symptoms include diarrhea, nausea and vomiting.       Constitution: Negative.   HENT: Negative.     Neck: neck negative.   Cardiovascular: Negative.    Eyes: Negative.    Respiratory: Negative.     Gastrointestinal:  Positive for abdominal pain, nausea, vomiting and diarrhea.   Genitourinary: Negative.    Musculoskeletal: Negative.    Skin: Negative.    Hematologic/Lymphatic: Negative.    Psychiatric/Behavioral: Negative.        Objective:     Physical Exam   Constitutional: She is oriented to person, place, and time.  Non-toxic appearance. She does not appear ill. No distress. normal  HENT:   Head: Normocephalic.   Nose: Nose normal.   Mouth/Throat: Mucous membranes are moist. No oropharyngeal exudate or posterior oropharyngeal erythema. Oropharynx is clear.   Eyes: Pupils are equal, round, and reactive to light.   Neck: Neck supple.   Cardiovascular: Normal rate, regular rhythm, normal heart sounds and normal pulses.   No murmur heard.Exam reveals no gallop.   Pulmonary/Chest: Effort normal and breath sounds normal. No respiratory distress. She has no wheezes. She has no rales.   Abdominal: Normal appearance. Soft. There is no abdominal tenderness.   Musculoskeletal: Normal range of motion.         General: Normal range of motion.   Neurological: no focal deficit. She is alert and oriented to person, place, and " time.   Skin: Skin is warm and dry. Capillary refill takes less than 2 seconds.   Psychiatric: Her behavior is normal. Mood, judgment and thought content normal.   Nursing note and vitals reviewed.      Assessment:     1. Generalized headaches    2. Nausea and vomiting, unspecified vomiting type        Plan:   Covid neg  Strep neg  Covid neg    Abdominal exam benign. No urinary symptoms. No STI concerns. Advised to follow up with PCP. RTC prn. ER for worsening of symptoms or failure to improve.     Generalized headaches  -     SARS Coronavirus 2 Antigen, POCT Manual Read  -     POCT Influenza A/B Rapid Antigen  -     POCT rapid strep A  -     ondansetron (ZOFRAN-ODT) 4 MG TbDL; Take 1 tablet (4 mg total) by mouth every 6 (six) hours as needed (nausea/vomiting).  Dispense: 20 tablet; Refill: 0    Nausea and vomiting, unspecified vomiting type  -     ondansetron (ZOFRAN-ODT) 4 MG TbDL; Take 1 tablet (4 mg total) by mouth every 6 (six) hours as needed (nausea/vomiting).  Dispense: 20 tablet; Refill: 0

## 2024-01-01 NOTE — TELEPHONE ENCOUNTER
Called and spoke with the pharmacist at the [facility] on [address] and they have benicar 40 mg in stock.  Patient states okay to [facility]   [id number]

## 2024-01-21 DIAGNOSIS — I10 HYPERTENSION, UNSPECIFIED TYPE: ICD-10-CM

## 2024-01-21 DIAGNOSIS — K21.9 GASTROESOPHAGEAL REFLUX DISEASE, UNSPECIFIED WHETHER ESOPHAGITIS PRESENT: ICD-10-CM

## 2024-01-22 ENCOUNTER — HOSPITAL ENCOUNTER (OUTPATIENT)
Dept: RADIOLOGY | Facility: CLINIC | Age: 49
Discharge: HOME | End: 2024-01-22
Attending: NURSE PRACTITIONER
Payer: COMMERCIAL

## 2024-01-22 DIAGNOSIS — Z12.31 ENCOUNTER FOR SCREENING MAMMOGRAM FOR BREAST CANCER: ICD-10-CM

## 2024-01-22 PROCEDURE — 77063 BREAST TOMOSYNTHESIS BI: CPT | Mod: 26,,, | Performed by: RADIOLOGY

## 2024-01-22 PROCEDURE — 77067 SCR MAMMO BI INCL CAD: CPT | Mod: 26,,, | Performed by: RADIOLOGY

## 2024-01-22 PROCEDURE — 77067 SCR MAMMO BI INCL CAD: CPT | Mod: TC,PO

## 2024-01-22 RX ORDER — PROPRANOLOL HYDROCHLORIDE 160 MG/1
160 CAPSULE, EXTENDED RELEASE ORAL DAILY
Qty: 90 CAPSULE | Refills: 0 | Status: SHIPPED | OUTPATIENT
Start: 2024-01-22 | End: 2024-03-01 | Stop reason: SDUPTHER

## 2024-01-22 RX ORDER — ESOMEPRAZOLE MAGNESIUM 40 MG/1
40 CAPSULE, DELAYED RELEASE ORAL
Qty: 90 CAPSULE | Refills: 0 | Status: SHIPPED | OUTPATIENT
Start: 2024-01-22 | End: 2024-03-01 | Stop reason: SDUPTHER

## 2024-01-22 RX ORDER — HYDROCHLOROTHIAZIDE 25 MG/1
25 TABLET ORAL DAILY
Qty: 90 TABLET | Refills: 0 | Status: SHIPPED | OUTPATIENT
Start: 2024-01-22 | End: 2024-03-01 | Stop reason: SDUPTHER

## 2024-01-22 RX ORDER — OLMESARTAN MEDOXOMIL 40 MG/1
40 TABLET ORAL DAILY
Qty: 90 TABLET | Refills: 0 | Status: SHIPPED | OUTPATIENT
Start: 2024-01-22 | End: 2024-03-01 | Stop reason: SDUPTHER

## 2024-02-20 ENCOUNTER — OFFICE VISIT (OUTPATIENT)
Dept: FAMILY MEDICINE | Facility: CLINIC | Age: 49
End: 2024-02-20
Payer: COMMERCIAL

## 2024-02-20 VITALS
BODY MASS INDEX: 42.64 KG/M2 | TEMPERATURE: 99 F | HEIGHT: 63 IN | HEART RATE: 57 BPM | DIASTOLIC BLOOD PRESSURE: 74 MMHG | OXYGEN SATURATION: 100 % | SYSTOLIC BLOOD PRESSURE: 118 MMHG | RESPIRATION RATE: 20 BRPM | WEIGHT: 240.63 LBS

## 2024-02-20 DIAGNOSIS — I10 HYPERTENSION, UNSPECIFIED TYPE: Primary | ICD-10-CM

## 2024-02-20 DIAGNOSIS — Z12.4 SCREENING FOR MALIGNANT NEOPLASM OF CERVIX: ICD-10-CM

## 2024-02-20 DIAGNOSIS — E55.9 VITAMIN D DEFICIENCY: ICD-10-CM

## 2024-02-20 DIAGNOSIS — E66.01 CLASS 3 SEVERE OBESITY DUE TO EXCESS CALORIES WITH SERIOUS COMORBIDITY AND BODY MASS INDEX (BMI) OF 40.0 TO 44.9 IN ADULT: ICD-10-CM

## 2024-02-20 DIAGNOSIS — Z13.1 DIABETES MELLITUS SCREENING: ICD-10-CM

## 2024-02-20 PROCEDURE — 1159F MED LIST DOCD IN RCRD: CPT | Mod: CPTII,S$GLB,, | Performed by: NURSE PRACTITIONER

## 2024-02-20 PROCEDURE — 3074F SYST BP LT 130 MM HG: CPT | Mod: CPTII,S$GLB,, | Performed by: NURSE PRACTITIONER

## 2024-02-20 PROCEDURE — 1160F RVW MEDS BY RX/DR IN RCRD: CPT | Mod: CPTII,S$GLB,, | Performed by: NURSE PRACTITIONER

## 2024-02-20 PROCEDURE — 3008F BODY MASS INDEX DOCD: CPT | Mod: CPTII,S$GLB,, | Performed by: NURSE PRACTITIONER

## 2024-02-20 PROCEDURE — 4010F ACE/ARB THERAPY RXD/TAKEN: CPT | Mod: CPTII,S$GLB,, | Performed by: NURSE PRACTITIONER

## 2024-02-20 PROCEDURE — 3078F DIAST BP <80 MM HG: CPT | Mod: CPTII,S$GLB,, | Performed by: NURSE PRACTITIONER

## 2024-02-20 PROCEDURE — 99999 PR PBB SHADOW E&M-EST. PATIENT-LVL V: CPT | Mod: PBBFAC,,, | Performed by: NURSE PRACTITIONER

## 2024-02-20 PROCEDURE — 99396 PREV VISIT EST AGE 40-64: CPT | Mod: S$GLB,,, | Performed by: NURSE PRACTITIONER

## 2024-02-20 NOTE — PROGRESS NOTES
SUBJECTIVE:      Patient ID: Kitty Jennings is a 48 y.o. female.    Chief Complaint: Hypertension    Kitty is here today for annual exam and physical.  She is taking her blood pressure medications as prescribed daily without side effects or complaints-blood pressure is below goal today.  She has been feeling well overall. Edema is less since she is changing positions more at work and she is feeling good.  She has a follow-up care with Hematology with [facility]. Recent mammogram reviewed.  Colonoscopy done with GI- UTD  Vaccinations reviewed with patient today.  She does need a referral to gynecology for a screening Pap smear.         Family History   Problem Relation Age of Onset    Hypertension Mother     Diabetes type II Mother     Diverticulosis Mother     Diverticulitis Mother     Hypertension Father     Heart disease Father     Diabetes type II Father     Hypertension Sister     Diverticulitis Sister     Hypertension Brother     Ovarian cancer Sister     No Known Problems Sister     No Known Problems Daughter     Colon cancer Neg Hx       Social History     Socioeconomic History    Marital status: Single   Tobacco Use    Smoking status: Never     Passive exposure: Past    Smokeless tobacco: Never   Substance and Sexual Activity    Alcohol use: No    Drug use: No    Sexual activity: Not Currently     Social Determinants of Health     Financial Resource Strain: Low Risk  (12/21/2018)    Overall Financial Resource Strain (CARDIA)     Difficulty of Paying Living Expenses: Not hard at all   Food Insecurity: No Food Insecurity (12/21/2018)    Hunger Vital Sign     Worried About Running Out of Food in the Last Year: Never true     Ran Out of Food in the Last Year: Never true   Transportation Needs: No Transportation Needs (12/21/2018)    PRAPARE - Transportation     Lack of Transportation (Medical): No     Lack of Transportation (Non-Medical): No   Physical Activity: Inactive (12/21/2018)    Exercise Vital Sign      Days of Exercise per Week: 0 days     Minutes of Exercise per Session: 0 min   Stress: Stress Concern Present (12/21/2018)    [employer] [place] [employer] - Occupational Stress Questionnaire     Feeling of Stress : To some extent   Social Connections: Socially Integrated (12/21/2018)    Social Connection and Isolation Panel [NHANES]     Frequency of Communication with Friends and Family: Three times a week     Frequency of Social Gatherings with Friends and Family: Three times a week     Attends Baptism Services: 1 to 4 times per year     Active Member of Clubs or Organizations: Yes     Attends Club or Organization Meetings: More than 4 times per year     Marital Status:      Current Outpatient Medications   Medication Sig Dispense Refill    cetirizine (ZYRTEC) 10 MG tablet Take 1 tablet (10 mg total) by mouth 2 (two) times daily. 180 tablet 4    esomeprazole (NEXIUM) 40 MG capsule Take 1 capsule (40 mg total) by mouth before breakfast. 90 capsule 0    hydroCHLOROthiazide (HYDRODIURIL) 25 MG tablet Take 1 tablet (25 mg total) by mouth once daily. 90 tablet 0    olmesartan (BENICAR) 40 MG tablet Take 1 tablet (40 mg total) by mouth once daily. 90 tablet 0    propranoloL (INDERAL LA) 160 mg 24 hr capsule Take 1 capsule (160 mg total) by mouth once daily. 90 capsule 0     No current facility-administered medications for this visit.     Review of patient's allergies indicates:  No Known Allergies   Past Medical History:   Diagnosis Date    Anemia     Diverticulosis     GERD (gastroesophageal reflux disease)     Hives of unknown origin     Hx of migraines     Hypertension      Past Surgical History:   Procedure Laterality Date    BARIATRIC SURGERY  09/11/2013    BREAST SURGERY      reduction    COLONOSCOPY N/A 6/4/2021    Procedure: COLONOSCOPY;  Surgeon: Ana Oscar MD;  Location: [facility];  Service: Endoscopy;  Laterality: N/A;    gastric sleeve      lipoma removal      TOTAL REDUCTION  "MAMMOPLASTY Bilateral 2018    UPPER GASTROINTESTINAL ENDOSCOPY  2016    Dr. Reid; hiatal hernia per pt report       Review of Systems   Constitutional:  Negative for activity change, appetite change, chills, fatigue, fever and unexpected weight change.   HENT:  Negative for congestion, hearing loss, rhinorrhea, sore throat and trouble swallowing.    Eyes:  Negative for pain, discharge and visual disturbance.   Respiratory:  Negative for cough, choking, chest tightness, shortness of breath and wheezing.    Cardiovascular:  Negative for chest pain, palpitations and leg swelling.   Gastrointestinal:  Negative for abdominal pain, blood in stool, constipation, diarrhea, nausea and vomiting.        +gerd - stable on nexium    Endocrine: Negative for cold intolerance, heat intolerance, polydipsia and polyuria.   Genitourinary:  Negative for difficulty urinating, dysuria, flank pain, frequency, hematuria, menstrual problem, vaginal bleeding and vaginal discharge.   Musculoskeletal:  Negative for arthralgias, joint swelling, myalgias and neck pain.   Skin:  Negative for pallor and rash.   Allergic/Immunologic: Negative for immunocompromised state.   Neurological:  Negative for dizziness, weakness, numbness and headaches.   Hematological:  Negative for adenopathy. Does not bruise/bleed easily.   Psychiatric/Behavioral:  Negative for confusion, dysphoric mood and sleep disturbance. The patient is not nervous/anxious.       OBJECTIVE:      Vitals:    02/20/24 1016   BP: 118/74   BP Location: Right arm   Patient Position: Sitting   BP Method: Large (Manual)   Pulse: (!) 57   Resp: 20   Temp: 98.7 °F (37.1 °C)   TempSrc: Oral   SpO2: 100%   Weight: 109.1 kg (240 lb 9.6 oz)   Height: 5' 3" (1.6 m)     Physical Exam  Vitals and nursing note reviewed.   Constitutional:       General: She is not in acute distress.     Appearance: Normal appearance. She is well-developed. She is not ill-appearing.      Comments: Morbid obesity  "   HENT:      Head: Normocephalic and atraumatic.      Right Ear: Tympanic membrane, ear canal and external ear normal.      Left Ear: Tympanic membrane, ear canal and external ear normal.      Nose: Nose normal.      Mouth/Throat:      Mouth: Mucous membranes are moist.      Pharynx: Oropharynx is clear. No oropharyngeal exudate.   Eyes:      General: No scleral icterus.     Extraocular Movements: Extraocular movements intact.      Conjunctiva/sclera: Conjunctivae normal.      Pupils: Pupils are equal, round, and reactive to light.   Neck:      Thyroid: No thyroid mass or thyromegaly.      Trachea: Trachea normal.   Cardiovascular:      Rate and Rhythm: Regular rhythm. Bradycardia present.      Pulses: Normal pulses.      Heart sounds: Normal heart sounds. No murmur heard.  Pulmonary:      Effort: Pulmonary effort is normal. No respiratory distress.      Breath sounds: Normal breath sounds. No wheezing or rales.   Abdominal:      General: Bowel sounds are normal.      Palpations: Abdomen is soft.      Tenderness: There is no abdominal tenderness.   Musculoskeletal:         General: Normal range of motion.      Cervical back: Normal range of motion and neck supple.      Right lower leg: No edema.      Left lower leg: No edema.   Lymphadenopathy:      Cervical: No cervical adenopathy.   Skin:     General: Skin is warm and dry.      Coloration: Skin is not jaundiced or pale.   Neurological:      Mental Status: She is alert and oriented to person, place, and time.   Psychiatric:         Mood and Affect: Mood normal.         Behavior: Behavior normal.         Thought Content: Thought content normal.         Judgment: Judgment normal.        Assessment:       1. Hypertension, unspecified type    2. Vitamin D deficiency    3. Screening for malignant neoplasm of cervix    4. Diabetes mellitus screening    5. Class 3 severe obesity due to excess calories with serious comorbidity and body mass index (BMI) of 40.0 to 44.9 in  adult        Plan:       Hypertension, unspecified type  -     Comprehensive Metabolic Panel; Future; Expected date: 02/20/2024  -     Lipid Panel; Future; Expected date: 02/20/2024  -     TSH; Future; Expected date: 02/20/2024  -     Urinalysis; Future; Expected date: 02/20/2024  -stable, continue medications    Vitamin D deficiency  -     Vitamin D; Future; Expected date: 02/20/2024    Screening for malignant neoplasm of cervix  -     Ambulatory referral/consult to Obstetrics / Gynecology; Future; Expected date: 02/27/2024    Diabetes mellitus screening  -     Hemoglobin A1C; Future; Expected date: 02/20/2024    Class 3 severe obesity due to excess calories with serious comorbidity and body mass index (BMI) of 40.0 to 44.9 in adult        Follow up in about 6 months (around 8/20/2024) for HTN .      2/20/2024 HIMANSHU Villa, FNP    This note was created using [employer] voice recognition software that occasionally misinterprets phrases or words.

## 2024-02-26 ENCOUNTER — TELEPHONE (OUTPATIENT)
Dept: HEMATOLOGY/ONCOLOGY | Facility: CLINIC | Age: 49
End: 2024-02-26

## 2024-03-01 ENCOUNTER — PATIENT MESSAGE (OUTPATIENT)
Dept: FAMILY MEDICINE | Facility: CLINIC | Age: 49
End: 2024-03-01
Payer: COMMERCIAL

## 2024-03-01 DIAGNOSIS — L50.8 CHRONIC URTICARIA: ICD-10-CM

## 2024-03-01 DIAGNOSIS — I10 HYPERTENSION, UNSPECIFIED TYPE: ICD-10-CM

## 2024-03-01 DIAGNOSIS — K21.9 GASTROESOPHAGEAL REFLUX DISEASE, UNSPECIFIED WHETHER ESOPHAGITIS PRESENT: ICD-10-CM

## 2024-03-01 RX ORDER — CETIRIZINE HYDROCHLORIDE 10 MG/1
10 TABLET ORAL DAILY
Qty: 90 TABLET | Refills: 1 | Status: SHIPPED | OUTPATIENT
Start: 2024-03-01 | End: 2024-03-15 | Stop reason: SDUPTHER

## 2024-03-01 RX ORDER — HYDROCHLOROTHIAZIDE 25 MG/1
25 TABLET ORAL DAILY
Qty: 90 TABLET | Refills: 1 | Status: SHIPPED | OUTPATIENT
Start: 2024-03-01

## 2024-03-01 RX ORDER — PROPRANOLOL HYDROCHLORIDE 160 MG/1
160 CAPSULE, EXTENDED RELEASE ORAL DAILY
Qty: 90 CAPSULE | Refills: 1 | Status: SHIPPED | OUTPATIENT
Start: 2024-03-01

## 2024-03-01 RX ORDER — ESOMEPRAZOLE MAGNESIUM 40 MG/1
40 CAPSULE, DELAYED RELEASE ORAL
Qty: 90 CAPSULE | Refills: 1 | Status: SHIPPED | OUTPATIENT
Start: 2024-03-01

## 2024-03-01 RX ORDER — OLMESARTAN MEDOXOMIL 40 MG/1
40 TABLET ORAL DAILY
Qty: 90 TABLET | Refills: 1 | Status: SHIPPED | OUTPATIENT
Start: 2024-03-01

## 2024-03-14 NOTE — PROGRESS NOTES
ALLERGY & IMMUNOLOGY CLINIC -  NEW PATIENT     HISTORY OF PRESENT ILLNESS     Patient ID: Kitty Jennings is a 48 y.o. female    CC:   Chief Complaint   Patient presents with    Allergies       HPI: Kitty Jennings is a 48 y.o. female presents for evaluation of:    03/15/2024  Hives: Previously followed with Dr. Chavez and noted that stress exacerbates symptoms. Notices that during these episodes will experience upper and lower extremity hives, lip and eye swelling. Previously poorly controlled with diphenhydramine and cetirizine 10mg daily. Symptoms now well controlled with cetirizine 20mg daily. When she stopped earlier this year, experienced progressively worsening hives after 2-3 days. Never required omalizumab. Denies worsening with Aspirin, NSAIDs, alcohol intake. Previous allergy testing was negative.      REVIEW OF SYSTEMS     CONST: no F/C/NS, no unintentional weight changes  Balance of review of systems negative except as mentioned above     MEDICAL HISTORY     MedHx: active problems reviewed  SurgHx:   Past Surgical History:   Procedure Laterality Date    BARIATRIC SURGERY  09/11/2013    BREAST SURGERY      reduction    COLONOSCOPY N/A 6/4/2021    Procedure: COLONOSCOPY;  Surgeon: Ana Oscar MD;  Location: [facility];  Service: Endoscopy;  Laterality: N/A;    gastric sleeve      lipoma removal      TOTAL REDUCTION MAMMOPLASTY Bilateral 2018    UPPER GASTROINTESTINAL ENDOSCOPY  2016    Dr. 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     Allergies: see below  Medications: MAR reviewed       PHYSICAL EXAM     VS: LMP  (LMP Unknown)   GENERAL: awake, alert, cooperative with exam  EYES: PERRL, EOMI, no conjunctival injection, no  "discharge, no infraorbital shiners  LUNGS: CTAB, no w/r/c, no increased WOB  HEART: Normal Rate and regular rhythm, normal S1/S2, no m/g/r  EXTREMITIES: +2 distal pulses, no c/c/e  DERM: no rashes, no skin breaks     CHART REVIEW     Chief Complaint: possible sinus infection  (Start Sunday with sinus pressure around eyes, yesterday her eyes were slightly swollen and this morning she was coughing along with mucous tinged with alittle blood. She can feel the driness in her sinues. No nasal spray and no fever. Doing generic zyrtec. )     Pt presents for a new complaint of rhinitis,       She states 2 days sx of congestion, pressure around the eyes, today onset of cough with production or day 3.  No rhinitis medications at this times.   Zyrtec for her hives that I have recommended in the past.      Her hives are "dormant" as long as she takes 2 zyrtec qam.   Otherwise urticaria ok.      Angioedema has occurred summer of 2021 in the form on a stye that resolved after a 1.5 yr span.   Thinks her lip has also been swollen as well.      Zyrtec daily has controlled her symptoms.      Condition: improved   Urticaria onset: Started in her teenage years  Associated facial swelling with hives, last week facial swelling.   Frequency: resolved as long as on zyrtec   Recently started working for [employer] x 8 years.   Tx: 2 zyrtec q am.       Saw another allergist: Dr. Waldron 5 years ago.   Prior had allergy testing   Serum and skin test was negative.      ASSESSMENT/PLAN     Kitty Jennings is a 48 y.o. female with       1. Chronic urticaria      Re-filled cetirizine 20mg daily  Increase to cetirizine 20mg BID as needed; add famotidine if necessary  Not a candidate for omalizumab     Follow up: 1 year      Jm Bhandari MD    I spent a total of 30 minutes on the day of the visit. This includes face to face time and non-face to face time preparing to see the patient (eg, review of tests), obtaining and/or reviewing separately " obtained history, documenting clinical information in the electronic or other health record, independently interpreting results and communicating results to the patient/family/caregiver, or care coordinator.

## 2024-03-15 ENCOUNTER — OFFICE VISIT (OUTPATIENT)
Dept: ALLERGY | Facility: CLINIC | Age: 49
End: 2024-03-15
Payer: COMMERCIAL

## 2024-03-15 ENCOUNTER — LAB VISIT (OUTPATIENT)
Dept: LAB | Facility: HOSPITAL | Age: 49
End: 2024-03-15
Attending: NURSE PRACTITIONER
Payer: COMMERCIAL

## 2024-03-15 VITALS — WEIGHT: 238 LBS | BODY MASS INDEX: 42.17 KG/M2 | HEIGHT: 63 IN

## 2024-03-15 DIAGNOSIS — Z13.1 DIABETES MELLITUS SCREENING: ICD-10-CM

## 2024-03-15 DIAGNOSIS — I10 HYPERTENSION, UNSPECIFIED TYPE: ICD-10-CM

## 2024-03-15 DIAGNOSIS — E55.9 VITAMIN D DEFICIENCY: ICD-10-CM

## 2024-03-15 DIAGNOSIS — L50.8 CHRONIC URTICARIA: ICD-10-CM

## 2024-03-15 LAB
25(OH)D3+25(OH)D2 SERPL-MCNC: 25 NG/ML (ref 30–96)
ALBUMIN SERPL BCP-MCNC: 3.9 G/DL (ref 3.5–5.2)
ALP SERPL-CCNC: 59 U/L (ref 55–135)
ALT SERPL W/O P-5'-P-CCNC: 15 U/L (ref 10–44)
ANION GAP SERPL CALC-SCNC: 7 MMOL/L (ref 8–16)
AST SERPL-CCNC: 15 U/L (ref 10–40)
BILIRUB SERPL-MCNC: 0.5 MG/DL (ref 0.1–1)
BUN SERPL-MCNC: 16 MG/DL (ref 6–20)
CALCIUM SERPL-MCNC: 9.2 MG/DL (ref 8.7–10.5)
CHLORIDE SERPL-SCNC: 103 MMOL/L (ref 95–110)
CHOLEST SERPL-MCNC: 173 MG/DL (ref 120–199)
CHOLEST/HDLC SERPL: 3.5 {RATIO} (ref 2–5)
CO2 SERPL-SCNC: 29 MMOL/L (ref 23–29)
CREAT SERPL-MCNC: 0.9 MG/DL (ref 0.5–1.4)
EST. GFR  (NO RACE VARIABLE): >60 ML/MIN/1.73 M^2
ESTIMATED AVG GLUCOSE: 134 MG/DL (ref 68–131)
GLUCOSE SERPL-MCNC: 101 MG/DL (ref 70–110)
HBA1C MFR BLD: 6.3 % (ref 4.5–6.2)
HDLC SERPL-MCNC: 50 MG/DL (ref 40–75)
HDLC SERPL: 28.9 % (ref 20–50)
LDLC SERPL CALC-MCNC: 95 MG/DL (ref 63–159)
NONHDLC SERPL-MCNC: 123 MG/DL
POTASSIUM SERPL-SCNC: 3.1 MMOL/L (ref 3.5–5.1)
PROT SERPL-MCNC: 7.3 G/DL (ref 6–8.4)
SODIUM SERPL-SCNC: 139 MMOL/L (ref 136–145)
TRIGL SERPL-MCNC: 140 MG/DL (ref 30–150)
TSH SERPL DL<=0.005 MIU/L-ACNC: 1.03 UIU/ML (ref 0.34–5.6)

## 2024-03-15 PROCEDURE — 3008F BODY MASS INDEX DOCD: CPT | Mod: CPTII,S$GLB,, | Performed by: STUDENT IN AN ORGANIZED HEALTH CARE EDUCATION/TRAINING PROGRAM

## 2024-03-15 PROCEDURE — 99999 PR PBB SHADOW E&M-EST. PATIENT-LVL III: CPT | Mod: PBBFAC,,, | Performed by: STUDENT IN AN ORGANIZED HEALTH CARE EDUCATION/TRAINING PROGRAM

## 2024-03-15 PROCEDURE — 84443 ASSAY THYROID STIM HORMONE: CPT | Performed by: NURSE PRACTITIONER

## 2024-03-15 PROCEDURE — 1159F MED LIST DOCD IN RCRD: CPT | Mod: CPTII,S$GLB,, | Performed by: STUDENT IN AN ORGANIZED HEALTH CARE EDUCATION/TRAINING PROGRAM

## 2024-03-15 PROCEDURE — 83036 HEMOGLOBIN GLYCOSYLATED A1C: CPT | Performed by: NURSE PRACTITIONER

## 2024-03-15 PROCEDURE — 99204 OFFICE O/P NEW MOD 45 MIN: CPT | Mod: S$GLB,,, | Performed by: STUDENT IN AN ORGANIZED HEALTH CARE EDUCATION/TRAINING PROGRAM

## 2024-03-15 PROCEDURE — 82306 VITAMIN D 25 HYDROXY: CPT | Performed by: NURSE PRACTITIONER

## 2024-03-15 PROCEDURE — 80061 LIPID PANEL: CPT | Performed by: NURSE PRACTITIONER

## 2024-03-15 PROCEDURE — 4010F ACE/ARB THERAPY RXD/TAKEN: CPT | Mod: CPTII,S$GLB,, | Performed by: STUDENT IN AN ORGANIZED HEALTH CARE EDUCATION/TRAINING PROGRAM

## 2024-03-15 PROCEDURE — 80053 COMPREHEN METABOLIC PANEL: CPT | Performed by: NURSE PRACTITIONER

## 2024-03-15 PROCEDURE — 36415 COLL VENOUS BLD VENIPUNCTURE: CPT | Performed by: NURSE PRACTITIONER

## 2024-03-15 RX ORDER — CETIRIZINE HYDROCHLORIDE 10 MG/1
20 TABLET ORAL DAILY
Qty: 180 TABLET | Refills: 3 | Status: SHIPPED | OUTPATIENT
Start: 2024-03-15 | End: 2025-03-10

## 2024-03-19 ENCOUNTER — TELEPHONE (OUTPATIENT)
Dept: HEMATOLOGY/ONCOLOGY | Facility: CLINIC | Age: 49
End: 2024-03-19

## 2024-03-19 ENCOUNTER — OFFICE VISIT (OUTPATIENT)
Dept: OBSTETRICS AND GYNECOLOGY | Facility: CLINIC | Age: 49
End: 2024-03-19
Payer: COMMERCIAL

## 2024-03-19 ENCOUNTER — LAB VISIT (OUTPATIENT)
Dept: LAB | Facility: HOSPITAL | Age: 49
End: 2024-03-19
Attending: INTERNAL MEDICINE
Payer: COMMERCIAL

## 2024-03-19 VITALS
HEIGHT: 63 IN | DIASTOLIC BLOOD PRESSURE: 70 MMHG | BODY MASS INDEX: 42.86 KG/M2 | WEIGHT: 241.88 LBS | SYSTOLIC BLOOD PRESSURE: 112 MMHG

## 2024-03-19 DIAGNOSIS — K95.89 IRON DEFICIENCY ANEMIA FOLLOWING BARIATRIC SURGERY: Primary | ICD-10-CM

## 2024-03-19 DIAGNOSIS — D51.8 ANEMIA OF DECREASED VITAMIN B12 ABSORPTION: ICD-10-CM

## 2024-03-19 DIAGNOSIS — D50.8 IRON DEFICIENCY ANEMIA FOLLOWING BARIATRIC SURGERY: Primary | ICD-10-CM

## 2024-03-19 DIAGNOSIS — D50.8 IRON DEFICIENCY ANEMIA FOLLOWING BARIATRIC SURGERY: ICD-10-CM

## 2024-03-19 DIAGNOSIS — Z01.419 WELL WOMAN EXAM WITH ROUTINE GYNECOLOGICAL EXAM: Primary | ICD-10-CM

## 2024-03-19 DIAGNOSIS — Z12.4 SCREENING FOR MALIGNANT NEOPLASM OF CERVIX: ICD-10-CM

## 2024-03-19 DIAGNOSIS — K95.89 IRON DEFICIENCY ANEMIA FOLLOWING BARIATRIC SURGERY: ICD-10-CM

## 2024-03-19 PROBLEM — N63.0 BREAST MASS IN FEMALE: Status: RESOLVED | Noted: 2019-01-03 | Resolved: 2024-03-19

## 2024-03-19 LAB
BASOPHILS # BLD AUTO: 0.05 K/UL (ref 0–0.2)
BASOPHILS NFR BLD: 0.8 % (ref 0–1.9)
DIFFERENTIAL METHOD BLD: ABNORMAL
EOSINOPHIL # BLD AUTO: 0.1 K/UL (ref 0–0.5)
EOSINOPHIL NFR BLD: 1.7 % (ref 0–8)
ERYTHROCYTE [DISTWIDTH] IN BLOOD BY AUTOMATED COUNT: 12.9 % (ref 11.5–14.5)
FERRITIN SERPL-MCNC: 8.2 NG/ML (ref 20–300)
HCT VFR BLD AUTO: 35.4 % (ref 37–48.5)
HGB BLD-MCNC: 11.3 G/DL (ref 12–16)
IMM GRANULOCYTES # BLD AUTO: 0.01 K/UL (ref 0–0.04)
IMM GRANULOCYTES NFR BLD AUTO: 0.2 % (ref 0–0.5)
LYMPHOCYTES # BLD AUTO: 2.7 K/UL (ref 1–4.8)
LYMPHOCYTES NFR BLD: 40.5 % (ref 18–48)
MCH RBC QN AUTO: 27.3 PG (ref 27–31)
MCHC RBC AUTO-ENTMCNC: 31.9 G/DL (ref 32–36)
MCV RBC AUTO: 86 FL (ref 82–98)
MONOCYTES # BLD AUTO: 0.6 K/UL (ref 0.3–1)
MONOCYTES NFR BLD: 8.6 % (ref 4–15)
NEUTROPHILS # BLD AUTO: 3.2 K/UL (ref 1.8–7.7)
NEUTROPHILS NFR BLD: 48.2 % (ref 38–73)
NRBC BLD-RTO: 0 /100 WBC
PLATELET # BLD AUTO: 259 K/UL (ref 150–450)
PMV BLD AUTO: 10.5 FL (ref 9.2–12.9)
RBC # BLD AUTO: 4.14 M/UL (ref 4–5.4)
WBC # BLD AUTO: 6.59 K/UL (ref 3.9–12.7)

## 2024-03-19 PROCEDURE — 3044F HG A1C LEVEL LT 7.0%: CPT | Mod: CPTII,S$GLB,, | Performed by: STUDENT IN AN ORGANIZED HEALTH CARE EDUCATION/TRAINING PROGRAM

## 2024-03-19 PROCEDURE — 3078F DIAST BP <80 MM HG: CPT | Mod: CPTII,S$GLB,, | Performed by: STUDENT IN AN ORGANIZED HEALTH CARE EDUCATION/TRAINING PROGRAM

## 2024-03-19 PROCEDURE — 82728 ASSAY OF FERRITIN: CPT | Performed by: INTERNAL MEDICINE

## 2024-03-19 PROCEDURE — 87624 HPV HI-RISK TYP POOLED RSLT: CPT | Performed by: STUDENT IN AN ORGANIZED HEALTH CARE EDUCATION/TRAINING PROGRAM

## 2024-03-19 PROCEDURE — 1159F MED LIST DOCD IN RCRD: CPT | Mod: CPTII,S$GLB,, | Performed by: STUDENT IN AN ORGANIZED HEALTH CARE EDUCATION/TRAINING PROGRAM

## 2024-03-19 PROCEDURE — 1160F RVW MEDS BY RX/DR IN RCRD: CPT | Mod: CPTII,S$GLB,, | Performed by: STUDENT IN AN ORGANIZED HEALTH CARE EDUCATION/TRAINING PROGRAM

## 2024-03-19 PROCEDURE — 3074F SYST BP LT 130 MM HG: CPT | Mod: CPTII,S$GLB,, | Performed by: STUDENT IN AN ORGANIZED HEALTH CARE EDUCATION/TRAINING PROGRAM

## 2024-03-19 PROCEDURE — 3008F BODY MASS INDEX DOCD: CPT | Mod: CPTII,S$GLB,, | Performed by: STUDENT IN AN ORGANIZED HEALTH CARE EDUCATION/TRAINING PROGRAM

## 2024-03-19 PROCEDURE — 36415 COLL VENOUS BLD VENIPUNCTURE: CPT | Performed by: INTERNAL MEDICINE

## 2024-03-19 PROCEDURE — 99386 PREV VISIT NEW AGE 40-64: CPT | Mod: S$GLB,,, | Performed by: STUDENT IN AN ORGANIZED HEALTH CARE EDUCATION/TRAINING PROGRAM

## 2024-03-19 PROCEDURE — 99999 PR PBB SHADOW E&M-EST. PATIENT-LVL III: CPT | Mod: PBBFAC,,, | Performed by: STUDENT IN AN ORGANIZED HEALTH CARE EDUCATION/TRAINING PROGRAM

## 2024-03-19 PROCEDURE — 88175 CYTOPATH C/V AUTO FLUID REDO: CPT | Performed by: STUDENT IN AN ORGANIZED HEALTH CARE EDUCATION/TRAINING PROGRAM

## 2024-03-19 PROCEDURE — 99459 PELVIC EXAMINATION: CPT | Mod: S$GLB,,, | Performed by: STUDENT IN AN ORGANIZED HEALTH CARE EDUCATION/TRAINING PROGRAM

## 2024-03-19 PROCEDURE — 85025 COMPLETE CBC W/AUTO DIFF WBC: CPT | Performed by: INTERNAL MEDICINE

## 2024-03-19 PROCEDURE — 4010F ACE/ARB THERAPY RXD/TAKEN: CPT | Mod: CPTII,S$GLB,, | Performed by: STUDENT IN AN ORGANIZED HEALTH CARE EDUCATION/TRAINING PROGRAM

## 2024-03-19 NOTE — PROGRESS NOTES
[facility]    Subjective:     Chief Complaint:   Chief Complaint   Patient presents with    Annual Exam       Patient's last menstrual period was Patient's last menstrual period was 2024 (exact date).  Contraception: None.  HRT: None.    2024    Kitty Jennings is a 48 y.o. female  who presents for an annual exam.  The patient has no GYN complaints today.  She participates in regular exercise: no.  She does not smoke or vape.  She is taking a multivitamin and vitamin D.     Menstrual cycles occur monthly lasting 3-5 days with no cramping or heavy bleeding issues. No issues with her cycles.  History of STI: never.   Sexually active: not currently.     Last Pap: 2018. Denies any history of abnormal pap smears.  Last mammogram: 24. Results: normal--routine follow-up in 12 months.    FH:  Breast cancer: none.  Colon cancer: none.  Endometrial cancer: none.  Ovarian cancer: half-sister (alive).  Cervical cancer: none.       OB History    Para Term  AB Living   1 1 0 1   1   SAB IAB Ectopic Multiple Live Births           1      # Outcome Date GA Lbr Pawan/2nd Weight Sex Delivery Anes PTL Lv   1  97 24w0d   F Vag-Spont   LUCY       Past Medical History:   Diagnosis Date    Anemia     Diverticulosis     GERD (gastroesophageal reflux disease)     Hives of unknown origin     Hx of migraines     Hypertension      Past Surgical History:   Procedure Laterality Date    ABDOMINOPLASTY  2023    BARIATRIC SURGERY  2013    Gastric sleeve    COLONOSCOPY N/A 2021    Procedure: COLONOSCOPY;  Surgeon: Ana Oscar MD;  Location: [facility];  Service: Endoscopy;  Laterality: N/A;    LIPOMA RESECTION      Back of neck. Benign.    TOTAL REDUCTION MAMMOPLASTY Bilateral 2018    UPPER GASTROINTESTINAL ENDOSCOPY  2016    Dr. Reid; hiatal hernia per pt report     Review of patient's allergies indicates:  No Known Allergies    Social History     Tobacco  "Use    Smoking status: Never     Passive exposure: Past    Smokeless tobacco: Never   Substance Use Topics    Alcohol use: No    Drug use: No       Family History   Problem Relation Age of Onset    Hypertension Mother     Diabetes type II Mother     Diverticulosis Mother     Diverticulitis Mother     Hypertension Father     Heart disease Father     Diabetes type II Father     Hypertension Sister     Diverticulitis Sister     Hypertension Brother     Ovarian cancer Sister     No Known Problems Sister     No Known Problems Daughter     Colon cancer Neg Hx        Medications  Current Outpatient Medications on File Prior to Visit   Medication Sig Dispense Refill Last Dose    cetirizine (ZYRTEC) 10 MG tablet Take 2 tablets (20 mg total) by mouth once daily. 180 tablet 3 Taking    esomeprazole (NEXIUM) 40 MG capsule Take 1 capsule (40 mg total) by mouth before breakfast. 90 capsule 1 Taking    hydroCHLOROthiazide (HYDRODIURIL) 25 MG tablet Take 1 tablet (25 mg total) by mouth once daily. 90 tablet 1 Taking    olmesartan (BENICAR) 40 MG tablet Take 1 tablet (40 mg total) by mouth once daily. 90 tablet 1 Taking    propranoloL (INDERAL LA) 160 mg 24 hr capsule Take 1 capsule (160 mg total) by mouth once daily. 90 capsule 1 Taking       Review of Systems   Constitutional: Negative for appetite change, fever and unexpected weight change.   Respiratory: Negative for cough and shortness of breath.    Cardiovascular: Negative for chest pain and palpitations.   Genitourinary: Negative for dyspareunia, dysuria, hematuria and pelvic pain.        GYN ROS per HPI.   Psychiatric/Behavioral: The patient is not nervous/anxious.      Objective:     /70 (BP Location: Left arm, Patient Position: Sitting, BP Method: Large (Manual))   Ht 5' 3" (1.6 m)   Wt 109.7 kg (241 lb 13.5 oz)   LMP 03/08/2024 (Exact Date)   BMI 42.84 kg/m²     Physical Exam  Chaperone present: Female chaperone present.   Constitutional:       General: She is " not in acute distress.  HENT:      Head: Normocephalic.   Eyes:      General: No scleral icterus.  Chest:      Comments: Patient declined breast exam.  Genitourinary:     Pubic Area: No rash.       Labia:         Right: No rash or tenderness.         Left: No rash or tenderness.       Vagina: No tenderness or bleeding.      Cervix: No cervical motion tenderness.      Uterus: Not tender.       Adnexa:         Right: No mass or tenderness.          Left: No mass or tenderness.        Comments: Normal physiologic discharge in vagina.  Limited bimanual due to prior abdominal procedures.   Neurological:      General: No focal deficit present.      Mental Status: She is alert.   Psychiatric:         Mood and Affect: Mood normal.         Assessment:     1. Well woman exam with routine gynecological exam    2. Screening for malignant neoplasm of cervix      Plan:     1. Well woman exam with routine gynecological exam    2. Screening for malignant neoplasm of cervix  - Ambulatory referral/consult to Obstetrics / Gynecology  - Liquid-Based Pap Smear, Screening  - HPV High Risk Genotypes, PCR      Follow up in about 1 year (around 3/19/2025) for annual exam or sooner if any GYN issues arise.       The above was reviewed and discussed with the patient.    Annual exam and screening issues based on the patient's age and family history were discussed.     Pelvic exam was within normal limits.     - Pap and HPV testing performed. Further recommendations for future pap smears and HPV testing will be provided once results return on the portal.   - Mammogram 1-22-24. Repeat in 1 year.   - Colonoscopy 6-4-21. Repeat in 2031.    - Counseled to get regular exercise at least 3 days a week doing 30 minutes of high intensity exercise or 60 minutes of low-moderate intensity exercise if patient is not currently exercising.   - Counseled to continue daily multivitamin and vitamin D for bone integrity.      The patient's questions were  answered, and she agrees with the current plan.      The patient was counseled today on the new ACS guidelines for cervical cytology screening as well as the current recommendations for breast cancer screening. She was counseled to follow up with her PCP for other routine health maintenance.      Jordyn Cifuentes PA-C  03/19/2024

## 2024-03-20 ENCOUNTER — TELEPHONE (OUTPATIENT)
Dept: FAMILY MEDICINE | Facility: CLINIC | Age: 49
End: 2024-03-20
Payer: COMMERCIAL

## 2024-03-20 DIAGNOSIS — E87.6 HYPOKALEMIA: Primary | ICD-10-CM

## 2024-03-20 RX ORDER — POTASSIUM CHLORIDE 20 MEQ/1
20 TABLET, EXTENDED RELEASE ORAL DAILY
Qty: 5 TABLET | Refills: 0 | Status: SHIPPED | OUTPATIENT
Start: 2024-03-20 | End: 2024-03-25

## 2024-03-20 NOTE — TELEPHONE ENCOUNTER
Called patient. No answer. Message left for return call to [phone] option #3. Portal message sent. Lab order left at  for .

## 2024-03-20 NOTE — PROGRESS NOTES
Potassium is a little low- will send in am supplement to replace levels and will need lab recheck in 2 weeks, just K levels and will print paper order for her; kidney and liver tests normal; A1C shows prediabetes; throid function normal cholesterol normal; cut down on carbs/sugars, increase fiber in diet and exercise/lose weight to help lower blood sugar levels; should recheck in 6 mo

## 2024-03-20 NOTE — TELEPHONE ENCOUNTER
----- Message from JOSE MARIA Trimble sent at 3/20/2024  9:34 AM CDT -----  Potassium is a little low- will send in am supplement to replace levels and will need lab recheck in 2 weeks, just K levels and will print paper order for her; kidney and liver tests normal; A1C shows prediabetes; throid function normal cholesterol normal; cut down on carbs/sugars, increase fiber in diet and exercise/lose weight to help lower blood sugar levels; should recheck in 6 mo

## 2024-03-25 LAB
FINAL PATHOLOGIC DIAGNOSIS: NORMAL
HPV HR 12 DNA SPEC QL NAA+PROBE: NEGATIVE
HPV16 AG SPEC QL: NEGATIVE
HPV18 DNA SPEC QL NAA+PROBE: NEGATIVE
Lab: NORMAL

## 2024-03-26 ENCOUNTER — OFFICE VISIT (OUTPATIENT)
Dept: HEMATOLOGY/ONCOLOGY | Facility: CLINIC | Age: 49
End: 2024-03-26
Payer: COMMERCIAL

## 2024-03-26 VITALS
SYSTOLIC BLOOD PRESSURE: 103 MMHG | BODY MASS INDEX: 42.59 KG/M2 | WEIGHT: 240.38 LBS | TEMPERATURE: 97 F | DIASTOLIC BLOOD PRESSURE: 56 MMHG | HEIGHT: 63 IN | HEART RATE: 70 BPM | RESPIRATION RATE: 16 BRPM

## 2024-03-26 DIAGNOSIS — K95.89 IRON DEFICIENCY ANEMIA FOLLOWING BARIATRIC SURGERY: Primary | ICD-10-CM

## 2024-03-26 DIAGNOSIS — D50.8 IRON DEFICIENCY ANEMIA FOLLOWING BARIATRIC SURGERY: Primary | ICD-10-CM

## 2024-03-26 PROCEDURE — 3078F DIAST BP <80 MM HG: CPT | Mod: CPTII,S$GLB,, | Performed by: INTERNAL MEDICINE

## 2024-03-26 PROCEDURE — 3044F HG A1C LEVEL LT 7.0%: CPT | Mod: CPTII,S$GLB,, | Performed by: INTERNAL MEDICINE

## 2024-03-26 PROCEDURE — 99214 OFFICE O/P EST MOD 30 MIN: CPT | Mod: S$GLB,,, | Performed by: INTERNAL MEDICINE

## 2024-03-26 PROCEDURE — 3074F SYST BP LT 130 MM HG: CPT | Mod: CPTII,S$GLB,, | Performed by: INTERNAL MEDICINE

## 2024-03-26 PROCEDURE — 4010F ACE/ARB THERAPY RXD/TAKEN: CPT | Mod: CPTII,S$GLB,, | Performed by: INTERNAL MEDICINE

## 2024-03-26 PROCEDURE — 1159F MED LIST DOCD IN RCRD: CPT | Mod: CPTII,S$GLB,, | Performed by: INTERNAL MEDICINE

## 2024-03-26 PROCEDURE — 3008F BODY MASS INDEX DOCD: CPT | Mod: CPTII,S$GLB,, | Performed by: INTERNAL MEDICINE

## 2024-03-29 ENCOUNTER — TELEPHONE (OUTPATIENT)
Dept: HEMATOLOGY/ONCOLOGY | Facility: CLINIC | Age: 49
End: 2024-03-29

## 2024-03-29 RX ORDER — SODIUM CHLORIDE 0.9 % (FLUSH) 0.9 %
10 SYRINGE (ML) INJECTION
OUTPATIENT
Start: 2024-04-10

## 2024-03-29 RX ORDER — EPINEPHRINE 0.3 MG/.3ML
0.3 INJECTION SUBCUTANEOUS ONCE AS NEEDED
OUTPATIENT
Start: 2024-04-10

## 2024-03-29 RX ORDER — SODIUM CHLORIDE 9 MG/ML
INJECTION, SOLUTION INTRAVENOUS CONTINUOUS
OUTPATIENT
Start: 2024-04-10

## 2024-03-29 RX ORDER — HEPARIN 100 UNIT/ML
5 SYRINGE INTRAVENOUS
OUTPATIENT
Start: 2024-04-10

## 2024-03-29 RX ORDER — DIPHENHYDRAMINE HYDROCHLORIDE 50 MG/ML
50 INJECTION INTRAMUSCULAR; INTRAVENOUS ONCE AS NEEDED
OUTPATIENT
Start: 2024-04-10

## 2024-03-29 NOTE — TELEPHONE ENCOUNTER
Orders placed in [facility] for injectafer x's 2, HH.    Get date and time    Get auth    RTC 6 months with CBC ferritin

## 2024-03-29 NOTE — PROGRESS NOTES
"[facility] hematology Oncology in office follow-up progress note  3/26/24    Subjective:      Patient ID:   Kitty Jennings  48 y.o. female  1975  Cecilia White NP      Chief Complaint:   Anemia eval.    HPI:  48 y.o. female with hx of Fe deficiency anemia, treated with oral Fe in the past.  Admits to fatigue decreasing.  Energy 7/10.  More time out of bed after a days work.  Intermittent hives since age 19.  Zertec and benadryl prn.  Dr. Chavez feels hives may be stress induced.  She does not get hives on the weekend, away from her job duties.    S/P gastric sleave surgery.  S/P Injectafer.  Hgb 13.2.  B 6 resolved peripheral neuropathy.  B 6 4 to 53 and now at 2.8.  Resume B 6 daily.  B 12 is 297.  She has not been taking her B 12 or B 6 supplements.  Resume  B 12 and B6 po now.    Now 23, Hgb 11.8, Ferritin 115 to 80.  B 12 463 to 330 to 254, she will resume her subl B 12.  Vit D is 10-12.  Begin Vit D 2-3,000 units daily.      Stronger.  Hive sx have improved.  She saw Dr. Chavez.  Measures taken to control hives sx.  Hives sx controlled with Zertec 20 mg daily.    She had 360 Lipo procedure and "tummy tuck" on 23.  Hgb now at 11.  Downward trend could be due to blood loss with surgery and last light menses.  Should  recover with compensatory RBC production in bone marrow.     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  Smoke no.  ETOH no. Allergy no.    Hx HTN, GERD, migraine HA.  Menses NL flow.  She has been sleeping poorly, under stress, caring for her mother, in failing health.    Gastric sleave surgery 13.  mammogram on 21 [facility]. Neg for Cancer.    Colonoscopy 2022 diverticulosis.    M0  Menses onset 11  1st live child at 22    No family hx of anemia.  Dad HTN, DM  Mom A & W  Sister HTN x's 2    Her daughter had covid, pt did not.  Pt has gotten 2/2 vaccines, booster, but not the flu shot.    ROS:   GEN: normal without any fever, night sweats or weight " "loss  HEENT: normal with no HA's, sore throat, stiff neck, changes in vision  CV: normal with no CP, SOB, PND, KAM or orthopnea  PULM: normal with no SOB, cough, hemoptysis, sputum or pleuritic pain  GI: normal with no abdominal pain, nausea, vomiting, constipation, diarrhea, melanotic stools, BRBPR, or hematemesis  : normal with no hematuria, dysuria  BREAST: normal with no mass, discharge, pain  SKIN: see HPI      Review of patient's allergies indicates:  No Known Allergies        Current Outpatient Medications:     cetirizine (ZYRTEC) 10 MG tablet, Take 2 tablets (20 mg total) by mouth once daily., Disp: 180 tablet, Rfl: 3    esomeprazole (NEXIUM) 40 MG capsule, Take 1 capsule (40 mg total) by mouth before breakfast., Disp: 90 capsule, Rfl: 1    hydroCHLOROthiazide (HYDRODIURIL) 25 MG tablet, Take 1 tablet (25 mg total) by mouth once daily., Disp: 90 tablet, Rfl: 1    olmesartan (BENICAR) 40 MG tablet, Take 1 tablet (40 mg total) by mouth once daily., Disp: 90 tablet, Rfl: 1    propranoloL (INDERAL LA) 160 mg 24 hr capsule, Take 1 capsule (160 mg total) by mouth once daily., Disp: 90 capsule, Rfl: 1          Objective:   Vitals:  Blood pressure (!) 103/56, pulse 70, temperature 97.2 °F (36.2 °C), resp. rate 16, height 5' 3" (1.6 m), weight 109 kg (240 lb 6.4 oz), last menstrual period 03/08/2024.        Ferritin stable at 92. After Injectafer infusions in past.  She takes B 12 subl daily, level at 607.  Vitamin D at 22, on 2000 units daily.  Hgb 11.Now ferritin is 8.  Assessment:   (1) 48 y.o. female with diagnosis of Fe deficiency 2nd decreased absorption 2nd bariatric surgery.  Gave  Injectafer  Weekly x's 2 to replenish Fe stores.  Estimated 1-2% risk of reaction, she tolerated it well.  [facility].  Anemia resolved to NL, with Fe replenishment.    (2)Referred to Dr. Lori Chavez of allergy/ immunology to try clarify hives and Rx. Hives controlled with Zertec 20 mg daily.    On B 6 po, PN sx in " feet resolved.    Order Injectafer infusion x's 2 at .  Continue B 12 subl daily.  Vit D 2,000 units daily.  Increase Vitamin to 4000 units daily  Continue multivitamin daily.    Hgb 11. Ferritin 8.  RTC 6 months with CBC, ferritin.

## 2024-04-01 ENCOUNTER — PATIENT MESSAGE (OUTPATIENT)
Dept: FAMILY MEDICINE | Facility: CLINIC | Age: 49
End: 2024-04-01
Payer: COMMERCIAL

## 2024-04-08 ENCOUNTER — PATIENT MESSAGE (OUTPATIENT)
Dept: HEMATOLOGY/ONCOLOGY | Facility: CLINIC | Age: 49
End: 2024-04-08

## 2024-04-16 ENCOUNTER — TELEPHONE (OUTPATIENT)
Dept: HEMATOLOGY/ONCOLOGY | Facility: CLINIC | Age: 49
End: 2024-04-16

## 2024-04-24 ENCOUNTER — PATIENT MESSAGE (OUTPATIENT)
Dept: FAMILY MEDICINE | Facility: CLINIC | Age: 49
End: 2024-04-24
Payer: COMMERCIAL

## 2024-04-29 ENCOUNTER — LAB VISIT (OUTPATIENT)
Dept: LAB | Facility: HOSPITAL | Age: 49
End: 2024-04-29
Attending: NURSE PRACTITIONER
Payer: COMMERCIAL

## 2024-04-29 DIAGNOSIS — E87.6 HYPOPOTASSEMIA: ICD-10-CM

## 2024-04-29 DIAGNOSIS — E87.6 HYPOPOTASSEMIA: Primary | ICD-10-CM

## 2024-04-29 LAB — POTASSIUM SERPL-SCNC: 3.6 MMOL/L (ref 3.5–5.1)

## 2024-04-29 PROCEDURE — 84132 ASSAY OF SERUM POTASSIUM: CPT | Performed by: NURSE PRACTITIONER

## 2024-04-29 PROCEDURE — 36415 COLL VENOUS BLD VENIPUNCTURE: CPT | Performed by: NURSE PRACTITIONER

## 2024-04-30 ENCOUNTER — TELEPHONE (OUTPATIENT)
Dept: FAMILY MEDICINE | Facility: CLINIC | Age: 49
End: 2024-04-30
Payer: COMMERCIAL

## 2024-04-30 NOTE — TELEPHONE ENCOUNTER
----- Message from JOSE MARIA Trimble sent at 4/30/2024  8:59 AM CDT -----  Please notify patient that results are normal.

## 2024-08-06 DIAGNOSIS — I10 HYPERTENSION, UNSPECIFIED TYPE: ICD-10-CM

## 2024-08-06 RX ORDER — OLMESARTAN MEDOXOMIL 40 MG/1
40 TABLET ORAL
Qty: 90 TABLET | Refills: 0 | Status: SHIPPED | OUTPATIENT
Start: 2024-08-06

## 2024-08-16 ENCOUNTER — LAB VISIT (OUTPATIENT)
Dept: LAB | Facility: HOSPITAL | Age: 49
End: 2024-08-16
Attending: NURSE PRACTITIONER
Payer: COMMERCIAL

## 2024-08-16 ENCOUNTER — OFFICE VISIT (OUTPATIENT)
Dept: FAMILY MEDICINE | Facility: CLINIC | Age: 49
End: 2024-08-16
Payer: COMMERCIAL

## 2024-08-16 VITALS
TEMPERATURE: 99 F | HEART RATE: 53 BPM | SYSTOLIC BLOOD PRESSURE: 114 MMHG | BODY MASS INDEX: 43.5 KG/M2 | HEIGHT: 63 IN | OXYGEN SATURATION: 99 % | DIASTOLIC BLOOD PRESSURE: 84 MMHG | WEIGHT: 245.5 LBS

## 2024-08-16 DIAGNOSIS — R73.03 PREDIABETES: ICD-10-CM

## 2024-08-16 DIAGNOSIS — I10 HYPERTENSION, UNSPECIFIED TYPE: ICD-10-CM

## 2024-08-16 DIAGNOSIS — N92.1 MENORRHAGIA WITH IRREGULAR CYCLE: ICD-10-CM

## 2024-08-16 DIAGNOSIS — K21.9 GASTROESOPHAGEAL REFLUX DISEASE, UNSPECIFIED WHETHER ESOPHAGITIS PRESENT: Primary | ICD-10-CM

## 2024-08-16 LAB
ALBUMIN SERPL BCP-MCNC: 3.6 G/DL (ref 3.5–5.2)
ALP SERPL-CCNC: 57 U/L (ref 55–135)
ALT SERPL W/O P-5'-P-CCNC: 16 U/L (ref 10–44)
ANION GAP SERPL CALC-SCNC: 11 MMOL/L (ref 8–16)
AST SERPL-CCNC: 15 U/L (ref 10–40)
BILIRUB SERPL-MCNC: 0.6 MG/DL (ref 0.1–1)
BUN SERPL-MCNC: 17 MG/DL (ref 6–20)
CALCIUM SERPL-MCNC: 9.7 MG/DL (ref 8.7–10.5)
CHLORIDE SERPL-SCNC: 103 MMOL/L (ref 95–110)
CO2 SERPL-SCNC: 24 MMOL/L (ref 23–29)
CREAT SERPL-MCNC: 0.8 MG/DL (ref 0.5–1.4)
EST. GFR  (NO RACE VARIABLE): >60 ML/MIN/1.73 M^2
ESTIMATED AVG GLUCOSE: 111 MG/DL (ref 68–131)
GLUCOSE SERPL-MCNC: 86 MG/DL (ref 70–110)
HBA1C MFR BLD: 5.5 % (ref 4–5.6)
POTASSIUM SERPL-SCNC: 3.6 MMOL/L (ref 3.5–5.1)
PROT SERPL-MCNC: 7.3 G/DL (ref 6–8.4)
SODIUM SERPL-SCNC: 138 MMOL/L (ref 136–145)

## 2024-08-16 PROCEDURE — 80053 COMPREHEN METABOLIC PANEL: CPT | Performed by: NURSE PRACTITIONER

## 2024-08-16 PROCEDURE — 99999 PR PBB SHADOW E&M-EST. PATIENT-LVL III: CPT | Mod: PBBFAC,,, | Performed by: NURSE PRACTITIONER

## 2024-08-16 PROCEDURE — 36415 COLL VENOUS BLD VENIPUNCTURE: CPT | Performed by: NURSE PRACTITIONER

## 2024-08-16 PROCEDURE — 83036 HEMOGLOBIN GLYCOSYLATED A1C: CPT | Performed by: NURSE PRACTITIONER

## 2024-08-16 RX ORDER — OLMESARTAN MEDOXOMIL 40 MG/1
40 TABLET ORAL DAILY
Qty: 90 TABLET | Refills: 1 | Status: SHIPPED | OUTPATIENT
Start: 2024-08-16

## 2024-08-16 RX ORDER — ESOMEPRAZOLE MAGNESIUM 40 MG/1
40 CAPSULE, DELAYED RELEASE ORAL
Qty: 90 CAPSULE | Refills: 1 | Status: SHIPPED | OUTPATIENT
Start: 2024-08-16

## 2024-08-16 RX ORDER — HYDROCHLOROTHIAZIDE 25 MG/1
25 TABLET ORAL DAILY
Qty: 90 TABLET | Refills: 1 | Status: SHIPPED | OUTPATIENT
Start: 2024-08-16

## 2024-08-16 RX ORDER — CETIRIZINE HYDROCHLORIDE 10 MG/1
20 TABLET ORAL DAILY
Qty: 180 TABLET | Refills: 3 | Status: CANCELLED | OUTPATIENT
Start: 2024-08-16 | End: 2025-08-11

## 2024-08-16 RX ORDER — PROPRANOLOL HYDROCHLORIDE 160 MG/1
160 CAPSULE, EXTENDED RELEASE ORAL DAILY
Qty: 90 CAPSULE | Refills: 1 | Status: SHIPPED | OUTPATIENT
Start: 2024-08-16

## 2024-08-16 NOTE — PROGRESS NOTES
SUBJECTIVE:      Patient ID: Kitty Jennings is a 49 y.o. female.    Chief Complaint: Follow-up (6 mon f/u), Edema (Bilateral feet ), and Menstrual Problem (Pt states that she has missed three months of her period and when it  came in August she states that it was heavier cycle for 7 days. She states that it was longer than her normal cycle. Pt states that she is currently spotting only seeing when wiping after using restroom which she states is abnormal for her as well. )    Kitty is here today for f/u on HTN and edema. Needs med refills today. She is taking her blood pressure medications as prescribed daily without side effects or complaints-blood pressure is below goal today.  She has been feeling well overall. Edema has increased due to prolonged sitting and not drinking much water.  She sits at her desk most of the day, does not wear compression socks.  Denies leg pain or color change.  She is continuing to follow-up care with Hematology with [facility].  Patient is complaining of irregular menses and believes she is in menopause/perimenopause.  Her periods have been heavier in the last 3 months but she denies pelvic pain, abnormal vaginal discharge, or feeling unwell.  Patient is also requesting to discuss weight loss options.  Does not eat much and not currently exercising.    Hypertension  This is a chronic problem. The current episode started more than 1 year ago. The problem is unchanged. The problem is controlled. Associated symptoms include peripheral edema (intermittent). Pertinent negatives include no anxiety, blurred vision, chest pain, headaches, malaise/fatigue, neck pain, orthopnea, palpitations, shortness of breath or sweats. There are no associated agents to hypertension. Risk factors for coronary artery disease include obesity, sedentary lifestyle and family history. Past treatments include beta blockers, diuretics and angiotensin blockers. The current treatment provides significant  improvement. Compliance problems include exercise and diet.  There is no history of angina, kidney disease, CAD/MI or CVA. There is no history of sleep apnea or a thyroid problem.   Edema  This is a recurrent problem. The current episode started more than 1 month ago. The problem occurs intermittently. The problem has been gradually worsening. Pertinent negatives include no abdominal pain, anorexia, arthralgias, change in bowel habit, chest pain, chills, congestion, coughing, diaphoresis, fatigue, fever, headaches, joint swelling, myalgias, nausea, neck pain, rash, sore throat, swollen glands, urinary symptoms, visual change, vomiting or weakness. The symptoms are aggravated by standing. Treatments tried: elevation, rest. The treatment provided moderate relief.       Family History   Problem Relation Name Age of Onset    Hypertension Mother      Diabetes type II Mother      Diverticulosis Mother      Diverticulitis Mother      Hypertension Father      Heart disease Father      Diabetes type II Father      Hypertension Sister      Diverticulitis Sister      Hypertension Brother      Ovarian cancer Sister 1/2     No Known Problems Sister      No Known Problems Daughter      Colon cancer Neg Hx        Social History     Socioeconomic History    Marital status: Single   Tobacco Use    Smoking status: Never     Passive exposure: Past    Smokeless tobacco: Never   Substance and Sexual Activity    Alcohol use: No    Drug use: No    Sexual activity: Not Currently     Social Determinants of Health     Financial Resource Strain: Low Risk  (8/16/2024)    Overall Financial Resource Strain (CARDIA)     Difficulty of Paying Living Expenses: Not hard at all   Food Insecurity: No Food Insecurity (8/16/2024)    Hunger Vital Sign     Worried About Running Out of Food in the Last Year: Never true     Ran Out of Food in the Last Year: Never true   Transportation Needs: No Transportation Needs (3/26/2024)    PRAPARE - Transportation      Lack of Transportation (Medical): No     Lack of Transportation (Non-Medical): No   Physical Activity: Inactive (8/16/2024)    Exercise Vital Sign     Days of Exercise per Week: 0 days     Minutes of Exercise per Session: 0 min   Stress: Stress Concern Present (8/16/2024)    [facility] Occupational [facility] - Occupational Stress Questionnaire     Feeling of Stress : To some extent   Housing Stability: Low Risk  (3/26/2024)    Housing Stability Vital Sign     Unable to Pay for Housing in the Last Year: No     Number of Places Lived in the Last Year: 1     Unstable Housing in the Last Year: No     Current Outpatient Medications   Medication Sig Dispense Refill    cetirizine (ZYRTEC) 10 MG tablet Take 2 tablets (20 mg total) by mouth once daily. 180 tablet 3    esomeprazole (NEXIUM) 40 MG capsule Take 1 capsule (40 mg total) by mouth before breakfast. 90 capsule 1    hydroCHLOROthiazide (HYDRODIURIL) 25 MG tablet Take 1 tablet (25 mg total) by mouth once daily. 90 tablet 1    olmesartan (BENICAR) 40 MG tablet Take 1 tablet (40 mg total) by mouth once daily. 90 tablet 1    propranoloL (INDERAL LA) 160 mg 24 hr capsule Take 1 capsule (160 mg total) by mouth once daily. 90 capsule 1     No current facility-administered medications for this visit.     Review of patient's allergies indicates:  No Known Allergies   Past Medical History:   Diagnosis Date    Anemia     Diverticulosis     GERD (gastroesophageal reflux disease)     Hives of unknown origin     Hx of migraines     Hypertension      Past Surgical History:   Procedure Laterality Date    ABDOMINOPLASTY  06/2023    BARIATRIC SURGERY  09/11/2013    Gastric sleeve    COLONOSCOPY N/A 06/04/2021    Procedure: COLONOSCOPY;  Surgeon: Ana Oscar MD;  Location: [facility];  Service: Endoscopy;  Laterality: N/A;    LIPOMA RESECTION      Back of neck. Benign.    TOTAL REDUCTION MAMMOPLASTY Bilateral 2018    UPPER GASTROINTESTINAL ENDOSCOPY  2016    Dr. Reid;  "hiatal hernia per pt report       Review of Systems   Constitutional:  Negative for activity change, chills, diaphoresis, fatigue, fever, malaise/fatigue and unexpected weight change.   HENT:  Negative for congestion, hearing loss, rhinorrhea, sore throat, trouble swallowing and voice change.    Eyes:  Negative for blurred vision, discharge and visual disturbance.   Respiratory:  Negative for cough, chest tightness, shortness of breath and wheezing.    Cardiovascular:  Negative for chest pain, palpitations and orthopnea.   Gastrointestinal:  Negative for abdominal pain, anorexia, blood in stool, change in bowel habit, constipation, diarrhea, nausea and vomiting.        GERD, stable on PPI   Endocrine: Negative for cold intolerance, heat intolerance, polydipsia and polyuria.   Genitourinary:  Positive for menstrual problem (menorrhagia). Negative for difficulty urinating, dysuria, frequency, hematuria, pelvic pain and vaginal discharge.   Musculoskeletal:  Negative for arthralgias, joint swelling, myalgias and neck pain.   Skin:  Negative for rash.   Neurological:  Negative for weakness and headaches.   Psychiatric/Behavioral:  Negative for confusion and dysphoric mood.       OBJECTIVE:      Vitals:    08/16/24 1033   BP: 114/84   BP Location: Right arm   Patient Position: Sitting   BP Method: Large (Manual)   Pulse: (!) 53   Temp: 98.5 °F (36.9 °C)   TempSrc: Oral   SpO2: 99%   Weight: 111.4 kg (245 lb 8 oz)   Height: 5' 3" (1.6 m)     Physical Exam  Vitals and nursing note reviewed.   Constitutional:       General: She is not in acute distress.     Appearance: Normal appearance. She is well-developed. She is not ill-appearing.      Comments: Morbid obesity    HENT:      Head: Normocephalic.      Nose: Nose normal.      Mouth/Throat:      Mouth: Mucous membranes are moist.   Eyes:      General: No scleral icterus.     Extraocular Movements: Extraocular movements intact.      Conjunctiva/sclera: Conjunctivae normal. "      Pupils: Pupils are equal, round, and reactive to light.   Neck:      Thyroid: No thyroid mass or thyromegaly.      Trachea: Trachea normal.   Cardiovascular:      Rate and Rhythm: Regular rhythm. Bradycardia present.      Heart sounds: Normal heart sounds. No murmur heard.  Pulmonary:      Effort: Pulmonary effort is normal. No respiratory distress.      Breath sounds: Normal breath sounds. No wheezing or rales.   Abdominal:      General: Bowel sounds are normal.      Palpations: Abdomen is soft.      Tenderness: There is no abdominal tenderness.   Musculoskeletal:         General: Normal range of motion.      Cervical back: Normal range of motion and neck supple.      Right lower leg: No edema.      Left lower leg: No edema.   Lymphadenopathy:      Cervical: No cervical adenopathy.   Skin:     General: Skin is warm and dry.      Coloration: Skin is not jaundiced or pale.   Neurological:      Mental Status: She is alert and oriented to person, place, and time.   Psychiatric:         Mood and Affect: Mood normal.         Behavior: Behavior normal.         Thought Content: Thought content normal.         Judgment: Judgment normal.        Assessment:       1. Gastroesophageal reflux disease, unspecified whether esophagitis present    2. Hypertension, unspecified type    3. Menorrhagia with irregular cycle    4. Prediabetes    5. BMI 40.0-44.9, adult        Plan:       Gastroesophageal reflux disease, unspecified whether esophagitis present  -     esomeprazole (NEXIUM) 40 MG capsule; Take 1 capsule (40 mg total) by mouth before breakfast.  Dispense: 90 capsule; Refill: 1  -stable on PPI daily, continue as prescribed    Hypertension, unspecified type  -     hydroCHLOROthiazide (HYDRODIURIL) 25 MG tablet; Take 1 tablet (25 mg total) by mouth once daily.  Dispense: 90 tablet; Refill: 1  -     olmesartan (BENICAR) 40 MG tablet; Take 1 tablet (40 mg total) by mouth once daily.  Dispense: 90 tablet; Refill: 1  -      propranoloL (INDERAL LA) 160 mg 24 hr capsule; Take 1 capsule (160 mg total) by mouth once daily.  Dispense: 90 capsule; Refill: 1  -hypertension controlled, continue medications as written; recheck labs; advised wear compression socks daily, elevate feet and need to increase water intake to help lessen edema    Menorrhagia with irregular cycle   -offered to order pelvic ultrasound to rule out for fibroid or ovarian cysts, patient declines; encouraged follow-up with gynecology if heavy menstrual bleeding persistent as this can worsen her anemia and she voiced understanding    Prediabetes  -     COMPREHENSIVE METABOLIC PANEL; Future; Expected date: 08/16/2024  -     Hemoglobin A1C; Future; Expected date: 08/16/2024  -recheck labs, patient is a candidate for weight loss injections or diabetes medication if worsened    BMI 40.0-44.9, adult        Follow up in about 2 weeks (around 8/30/2024) for f/u weight, labs .      8/16/2024 HIMANSHU Villa, FNP    This note was created using [employer] voice recognition software that occasionally misinterprets phrases or words.

## 2024-08-19 ENCOUNTER — PATIENT MESSAGE (OUTPATIENT)
Dept: FAMILY MEDICINE | Facility: CLINIC | Age: 49
End: 2024-08-19
Payer: COMMERCIAL

## 2024-08-26 ENCOUNTER — TELEPHONE (OUTPATIENT)
Dept: FAMILY MEDICINE | Facility: CLINIC | Age: 49
End: 2024-08-26

## 2024-08-26 ENCOUNTER — OFFICE VISIT (OUTPATIENT)
Dept: FAMILY MEDICINE | Facility: CLINIC | Age: 49
End: 2024-08-26
Payer: COMMERCIAL

## 2024-08-26 ENCOUNTER — HOSPITAL ENCOUNTER (OUTPATIENT)
Dept: RADIOLOGY | Facility: HOSPITAL | Age: 49
Discharge: HOME | End: 2024-08-26
Attending: NURSE PRACTITIONER
Payer: COMMERCIAL

## 2024-08-26 VITALS
HEART RATE: 62 BPM | BODY MASS INDEX: 43.02 KG/M2 | TEMPERATURE: 98 F | HEIGHT: 63 IN | WEIGHT: 242.81 LBS | DIASTOLIC BLOOD PRESSURE: 78 MMHG | SYSTOLIC BLOOD PRESSURE: 124 MMHG | OXYGEN SATURATION: 99 % | RESPIRATION RATE: 16 BRPM

## 2024-08-26 DIAGNOSIS — E66.01 MORBID OBESITY DUE TO EXCESS CALORIES: Primary | ICD-10-CM

## 2024-08-26 DIAGNOSIS — N92.1 MENORRHAGIA WITH IRREGULAR CYCLE: ICD-10-CM

## 2024-08-26 PROCEDURE — 3074F SYST BP LT 130 MM HG: CPT | Mod: CPTII,S$GLB,, | Performed by: NURSE PRACTITIONER

## 2024-08-26 PROCEDURE — 76856 US EXAM PELVIC COMPLETE: CPT | Mod: 26,,, | Performed by: RADIOLOGY

## 2024-08-26 PROCEDURE — 1159F MED LIST DOCD IN RCRD: CPT | Mod: CPTII,S$GLB,, | Performed by: NURSE PRACTITIONER

## 2024-08-26 PROCEDURE — 1160F RVW MEDS BY RX/DR IN RCRD: CPT | Mod: CPTII,S$GLB,, | Performed by: NURSE PRACTITIONER

## 2024-08-26 PROCEDURE — 3078F DIAST BP <80 MM HG: CPT | Mod: CPTII,S$GLB,, | Performed by: NURSE PRACTITIONER

## 2024-08-26 PROCEDURE — 99999 PR PBB SHADOW E&M-EST. PATIENT-LVL V: CPT | Mod: PBBFAC,,, | Performed by: NURSE PRACTITIONER

## 2024-08-26 PROCEDURE — 76830 TRANSVAGINAL US NON-OB: CPT | Mod: TC

## 2024-08-26 PROCEDURE — 76830 TRANSVAGINAL US NON-OB: CPT | Mod: 26,,, | Performed by: RADIOLOGY

## 2024-08-26 PROCEDURE — 76856 US EXAM PELVIC COMPLETE: CPT | Mod: TC

## 2024-08-26 PROCEDURE — 4010F ACE/ARB THERAPY RXD/TAKEN: CPT | Mod: CPTII,S$GLB,, | Performed by: NURSE PRACTITIONER

## 2024-08-26 PROCEDURE — 3008F BODY MASS INDEX DOCD: CPT | Mod: CPTII,S$GLB,, | Performed by: NURSE PRACTITIONER

## 2024-08-26 PROCEDURE — 3044F HG A1C LEVEL LT 7.0%: CPT | Mod: CPTII,S$GLB,, | Performed by: NURSE PRACTITIONER

## 2024-08-26 PROCEDURE — 99214 OFFICE O/P EST MOD 30 MIN: CPT | Mod: S$GLB,,, | Performed by: NURSE PRACTITIONER

## 2024-08-26 NOTE — TELEPHONE ENCOUNTER
----- Message from JOSE MARIA Trimble sent at 8/26/2024  3:34 PM CDT -----  Please tell patient her ultrasound shows uterine fibroids as well as a left ovarian cyst-this is likely the cause of her excessive and heavy bleeding as well as irregular periods, recommend gynecology follow-up as soon as possible

## 2024-08-26 NOTE — PATIENT INSTRUCTIONS
Please arrive 5-10 minutes prior to your appointment. If you are late, you may have to reschedule your appt.     You have received a referral or referrals to an outside provider and a hard copy of that referral has been given to you. You should receive a call from that provider's office to schedule your appt., but if you do not, please reach out to that provider's office to schedule your appt.

## 2024-08-26 NOTE — TELEPHONE ENCOUNTER
[employer] PA Approved    Prior authorization approved  Payer: [facility] [employer] FEP Case ID: [id number]    [id number]  Note from payer: Your PA request has been approved. Additional information will be provided in the approval communication. (Message 1148)  Approval Details    Authorized from July 27, 2024 to August 26, 2025  Electronic appeal: Not supported

## 2024-08-26 NOTE — PROGRESS NOTES
Please tell patient her ultrasound shows uterine fibroids as well as a left ovarian cyst-this is likely the cause of her excessive and heavy bleeding as well as irregular periods, recommend gynecology follow-up as soon as possible

## 2024-08-26 NOTE — PROGRESS NOTES
SUBJECTIVE:      Patient ID: Kitty Jennings is a 49 y.o. female.    Chief Complaint: Weight Loss    Kitty is here for review of recent labs and weight loss options. Pt was 14 minutes late for her 20 minute appt today. Recent lab results reviewed- A1C dropped to 5.5 from 6.3! Pt is eating lower carb and no sugar, but only eats one time daily. Wegovy is covered on her insurance plan but she also wants dietary and nutrition counseling.     Also still reports heavy and irregular menses- now requesting US we discussed at last visit. Started on her period again Friday after having one for 2 weeks straight several weeks ago. Her gyn left and needs new referral.       Lab Visit on 08/16/2024   Component Date Value Ref Range Status    Sodium 08/16/2024 138  136 - 145 mmol/L Final    Potassium 08/16/2024 3.6  3.5 - 5.1 mmol/L Final    Chloride 08/16/2024 103  95 - 110 mmol/L Final    CO2 08/16/2024 24  23 - 29 mmol/L Final    Glucose 08/16/2024 86  70 - 110 mg/dL Final    BUN 08/16/2024 17  6 - 20 mg/dL Final    Creatinine 08/16/2024 0.8  0.5 - 1.4 mg/dL Final    Calcium 08/16/2024 9.7  8.7 - 10.5 mg/dL Final    Total Protein 08/16/2024 7.3  6.0 - 8.4 g/dL Final    Albumin 08/16/2024 3.6  3.5 - 5.2 g/dL Final    Total Bilirubin 08/16/2024 0.6  0.1 - 1.0 mg/dL Final    Comment: For infants and newborns, interpretation of results should be based  on gestational age, weight and in agreement with clinical  observations.    Premature Infant recommended reference ranges:  Up to 24 hours.............<8.0 mg/dL  Up to 48 hours............<12.0 mg/dL  3-5 days..................<15.0 mg/dL  6-29 days.................<15.0 mg/dL      Alkaline Phosphatase 08/16/2024 57  55 - 135 U/L Final    AST 08/16/2024 15  10 - 40 U/L Final    ALT 08/16/2024 16  10 - 44 U/L Final    eGFR 08/16/2024 >60.0  >60 mL/min/1.73 m^2 Final    Anion Gap 08/16/2024 11  8 - 16 mmol/L Final    Hemoglobin A1C 08/16/2024 5.5  4.0 - 5.6 % Final    Comment:  ADA Screening Guidelines:  5.7-6.4%  Consistent with prediabetes  >or=6.5%  Consistent with diabetes    High levels of fetal hemoglobin interfere with the HbA1C  assay. Heterozygous hemoglobin variants (HbS, HgC, etc)do  not significantly interfere with this assay.   However, presence of multiple variants may affect accuracy.      Estimated Avg Glucose 08/16/2024 111  68 - 131 mg/dL Final   ]  Family History   Problem Relation Name Age of Onset    Hypertension Mother      Diabetes type II Mother      Diverticulosis Mother      Diverticulitis Mother      Hypertension Father      Heart disease Father      Diabetes type II Father      Hypertension Sister      Diverticulitis Sister      Hypertension Brother      Ovarian cancer Sister 1/2     No Known Problems Sister      No Known Problems Daughter      Colon cancer Neg Hx        Social History     Socioeconomic History    Marital status: Single   Tobacco Use    Smoking status: Never     Passive exposure: Past    Smokeless tobacco: Never   Substance and Sexual Activity    Alcohol use: No    Drug use: No    Sexual activity: Not Currently     Social Determinants of Health     Financial Resource Strain: Low Risk  (8/16/2024)    Overall Financial Resource Strain (CARDIA)     Difficulty of Paying Living Expenses: Not hard at all   Food Insecurity: No Food Insecurity (8/16/2024)    Hunger Vital Sign     Worried About Running Out of Food in the Last Year: Never true     Ran Out of Food in the Last Year: Never true   Transportation Needs: No Transportation Needs (3/26/2024)    PRAPARE - Transportation     Lack of Transportation (Medical): No     Lack of Transportation (Non-Medical): No   Physical Activity: Inactive (8/16/2024)    Exercise Vital Sign     Days of Exercise per Week: 0 days     Minutes of Exercise per Session: 0 min   Stress: Stress Concern Present (8/16/2024)    [employer] [place] [employer] - Occupational Stress Questionnaire     Feeling of Stress : To  some extent   Housing Stability: Low Risk  (3/26/2024)    Housing Stability Vital Sign     Unable to Pay for Housing in the Last Year: No     Number of Places Lived in the Last Year: 1     Unstable Housing in the Last Year: No     Current Outpatient Medications   Medication Sig Dispense Refill    cetirizine (ZYRTEC) 10 MG tablet Take 2 tablets (20 mg total) by mouth once daily. 180 tablet 3    esomeprazole (NEXIUM) 40 MG capsule Take 1 capsule (40 mg total) by mouth before breakfast. 90 capsule 1    hydroCHLOROthiazide (HYDRODIURIL) 25 MG tablet Take 1 tablet (25 mg total) by mouth once daily. 90 tablet 1    olmesartan (BENICAR) 40 MG tablet Take 1 tablet (40 mg total) by mouth once daily. 90 tablet 1    propranoloL (INDERAL LA) 160 mg 24 hr capsule Take 1 capsule (160 mg total) by mouth once daily. 90 capsule 1     No current facility-administered medications for this visit.     Review of patient's allergies indicates:  No Known Allergies   Past Medical History:   Diagnosis Date    Anemia     Diverticulosis     GERD (gastroesophageal reflux disease)     Hives of unknown origin     Hx of migraines     Hypertension      Past Surgical History:   Procedure Laterality Date    ABDOMINOPLASTY  06/2023    BARIATRIC SURGERY  09/11/2013    Gastric sleeve    COLONOSCOPY N/A 06/04/2021    Procedure: COLONOSCOPY;  Surgeon: Ana Oscar MD;  Location: [facility];  Service: Endoscopy;  Laterality: N/A;    LIPOMA RESECTION      Back of neck. Benign.    TOTAL REDUCTION MAMMOPLASTY Bilateral 2018    UPPER GASTROINTESTINAL ENDOSCOPY  2016    Dr. Reid; hiatal hernia per pt report       Review of Systems   Constitutional:  Negative for activity change, chills, fatigue, fever and unexpected weight change.   HENT:  Negative for hearing loss, rhinorrhea and trouble swallowing.    Eyes:  Negative for discharge and visual disturbance.   Respiratory:  Negative for chest tightness, shortness of breath and wheezing.    Cardiovascular:   "Negative for chest pain and palpitations.   Gastrointestinal:  Negative for blood in stool, constipation, diarrhea and vomiting.   Endocrine: Negative for polydipsia and polyuria.   Genitourinary:  Positive for menstrual problem. Negative for difficulty urinating, dysuria, frequency, hematuria, pelvic pain and vaginal discharge.   Musculoskeletal:  Negative for arthralgias, joint swelling and neck pain.   Skin:  Negative for rash.   Neurological:  Negative for headaches.   Hematological:  Negative for adenopathy. Does not bruise/bleed easily.   Psychiatric/Behavioral:  Negative for confusion and dysphoric mood.       OBJECTIVE:      Vitals:    08/26/24 0939   BP: 124/78   BP Location: Left arm   Patient Position: Sitting   BP Method: Large (Manual)   Pulse: 62   Resp: 16   Temp: 98 °F (36.7 °C)   TempSrc: Oral   SpO2: 99%   Weight: 110.1 kg (242 lb 12.8 oz)   Height: 5' 3" (1.6 m)     Physical Exam  Vitals and nursing note reviewed.   Constitutional:       General: She is not in acute distress.     Appearance: Normal appearance. She is obese.   HENT:      Mouth/Throat:      Mouth: Mucous membranes are moist.   Cardiovascular:      Rate and Rhythm: Normal rate and regular rhythm.   Pulmonary:      Effort: Pulmonary effort is normal. No respiratory distress.   Musculoskeletal:      Cervical back: Normal range of motion.   Skin:     Coloration: Skin is not jaundiced or pale.   Neurological:      Mental Status: She is alert and oriented to person, place, and time.   Psychiatric:         Mood and Affect: Mood normal.         Behavior: Behavior normal.        Assessment:       1. Morbid obesity due to excess calories    2. Menorrhagia with irregular cycle        Plan:       Morbid obesity due to excess calories  -     Ambulatory referral/consult to Bariatric/Obesity Medicine; Future; Expected date: 09/02/2024  -discussed nondiabetic options including Wegovy; patient would like to see a dietitian and nutrition counselor " who can provide individualized care in regards to her diet, we can refer her to Medical weight loss Clinic with bariatric and she is agreeable to this plan as we do not have a dietitian in office; patient congratulated as her prediabetes has resolved    Menorrhagia with irregular cycle  -     Ambulatory referral/consult to Obstetrics / Gynecology; Future; Expected date: 09/02/2024  -     US Pelvis Complete Non OB; Future; Expected date: 08/26/2024   -will order pelvic ultrasound to rule out fibroids versus ovarian cysts, encouraged gynecology follow-up and new referral placed as her provider has left      Follow up in about 6 months (around 2/26/2025) for f/u HTN .      8/26/2024 Cecilia White, HIMANSHU, FNP    This note was created using [employer] voice recognition software that occasionally misinterprets phrases or words.

## 2024-08-27 ENCOUNTER — OFFICE VISIT (OUTPATIENT)
Dept: OBSTETRICS AND GYNECOLOGY | Facility: CLINIC | Age: 49
End: 2024-08-27
Payer: COMMERCIAL

## 2024-08-27 ENCOUNTER — LAB VISIT (OUTPATIENT)
Dept: LAB | Facility: HOSPITAL | Age: 49
End: 2024-08-27
Attending: GENERAL PRACTICE
Payer: COMMERCIAL

## 2024-08-27 VITALS
SYSTOLIC BLOOD PRESSURE: 122 MMHG | WEIGHT: 245.06 LBS | RESPIRATION RATE: 17 BRPM | HEIGHT: 63 IN | DIASTOLIC BLOOD PRESSURE: 78 MMHG | BODY MASS INDEX: 43.42 KG/M2

## 2024-08-27 DIAGNOSIS — N83.209 CYST OF OVARY, UNSPECIFIED LATERALITY: Primary | ICD-10-CM

## 2024-08-27 DIAGNOSIS — N92.1 MENORRHAGIA WITH IRREGULAR CYCLE: ICD-10-CM

## 2024-08-27 DIAGNOSIS — N83.209 CYST OF OVARY, UNSPECIFIED LATERALITY: ICD-10-CM

## 2024-08-27 PROCEDURE — 36415 COLL VENOUS BLD VENIPUNCTURE: CPT | Mod: PO | Performed by: GENERAL PRACTICE

## 2024-08-27 PROCEDURE — 99214 OFFICE O/P EST MOD 30 MIN: CPT | Mod: S$GLB,,, | Performed by: GENERAL PRACTICE

## 2024-08-27 PROCEDURE — 4010F ACE/ARB THERAPY RXD/TAKEN: CPT | Mod: CPTII,S$GLB,, | Performed by: GENERAL PRACTICE

## 2024-08-27 PROCEDURE — 99999 PR PBB SHADOW E&M-EST. PATIENT-LVL III: CPT | Mod: PBBFAC,,, | Performed by: GENERAL PRACTICE

## 2024-08-27 PROCEDURE — 3044F HG A1C LEVEL LT 7.0%: CPT | Mod: CPTII,S$GLB,, | Performed by: GENERAL PRACTICE

## 2024-08-27 PROCEDURE — 3008F BODY MASS INDEX DOCD: CPT | Mod: CPTII,S$GLB,, | Performed by: GENERAL PRACTICE

## 2024-08-27 PROCEDURE — 86304 IMMUNOASSAY TUMOR CA 125: CPT | Performed by: GENERAL PRACTICE

## 2024-08-27 PROCEDURE — 1159F MED LIST DOCD IN RCRD: CPT | Mod: CPTII,S$GLB,, | Performed by: GENERAL PRACTICE

## 2024-08-27 PROCEDURE — 3078F DIAST BP <80 MM HG: CPT | Mod: CPTII,S$GLB,, | Performed by: GENERAL PRACTICE

## 2024-08-27 PROCEDURE — 86305 HUMAN EPIDIDYMIS PROTEIN 4: CPT | Performed by: GENERAL PRACTICE

## 2024-08-27 PROCEDURE — 3074F SYST BP LT 130 MM HG: CPT | Mod: CPTII,S$GLB,, | Performed by: GENERAL PRACTICE

## 2024-08-27 NOTE — PATIENT INSTRUCTIONS
Kitty,    I have placed a referral for you to see GYN-Oncology for the complex ovarian cyst.    Please expect a phone call from their office to get you scheduled.    If you haven't heard anything in a week, feel free to call Ms. Caroline Lockwood at [phone].  She is the patient coordinator for the GYN-ONC clinic.    Sincerely,  Dr. Matthews

## 2024-08-27 NOTE — PROGRESS NOTES
"  HISTORY OF THE PRESENT ILLNESS    2024  Kitty Jennings is a 49 y.o. here for a discussion about recent ultrasound findings.  She suspects she is perimenopausal, having missed periods in  and JUL.  However in AUG had 7 days of bleeding with moderate to heavy flow, followed by 7d of light bleeding.  Nothing for 3-5 days.  Friday had bright red again.  Tapered off and then none today.    Reports having had a pelvic US in 2018 with a provider outside the [facility] system, and no one told her at that time that she had fibroids.  This recent ultrasound does show fibroids, and it also shows a "mildly complex cyst with internal septation."    G'sP's:   LMP: 02 AUG  Relationship: single and not having sex  Contraception: abstinence  PAP Hx: no h/o abnormals  LAST PAP: PAP neg / HPV neg / Date: 3/25/2024  MMG (24) = negative     LABS & RADS   Lab Results   Component Value Date    WBC 6.59 2024    HGB 11.3 (L) 2024    HCT 35.4 (L) 2024     2024    MCV 86 2024      Lab Results   Component Value Date    TSH 1.029 03/15/2024      PELVIC US (24) =  Uterus 8 x 4 x 6 cm  EMS 6.3 mm  ROV not seen  LOV 3 x 2 x 3 cm, 1.5 x 1.8 x 1.8 cm mildly complex cyst with internal septation   Other: two fibroids 4cm each         GYNECOLOGIC HISTORY  Cervical CA Screen / Infectious   PAP Hx: no h/o abnormals  Genital HSV: no  STD Hx: no past history   Bleeding   Menarche: 11 yoa  Period duration: 3-5 days  # Heavy Days: 1-2  Pad/tampon ? on heavy days: 3x  Intermenstrual bleeding: No  Period frequency:regular every 28-30 days   Pain   Dysmenorrhea: No  Non-menstrual pelvic pressure/pain: No  Dyspareunia: N/A   Other   Vasomotor Sxs: denies  Vaginal dryness: denies     OBSTETRIC HISTORY  Living children: 1  Vaginal deliveries: 1    PAST MEDICAL HISTORY  -------------------------------------    Anemia    Diverticulosis    GERD (gastroesophageal reflux disease)    Hives of unknown " origin    Hx of migraines    Hypertension   Fibroids   BMI 43    PAST SURGICAL HISTORY  ----------------------------    Abdominoplasty    Bariatric surgery    Gastric sleeve    Colonoscopy    Procedure: COLONOSCOPY;  Surgeon: Ana Oscar MD;  Location: [facility];  Service: Endoscopy;  Laterality: N/A;    Lipoma resection    Back of neck. Benign.    Total reduction mammoplasty    Upper gastrointestinal endoscopy    Dr. Reid; hiatal hernia per pt report     ALLERGIES  Review of patient's allergies indicates:  No Known Allergies    MEDICATIONS  Current Outpatient Medications   Medication Instructions    cetirizine (ZYRTEC) 20 mg, Oral, Daily    esomeprazole (NEXIUM) 40 mg, Oral, Before breakfast    hydroCHLOROthiazide (HYDRODIURIL) 25 mg, Oral, Daily    olmesartan (BENICAR) 40 mg, Oral, Daily    propranoloL (INDERAL LA) 160 mg, Oral, Daily     SOCIAL HISTORY  Lives with: daughter  Smoker: non-smoker  Alcohol: yes, socially  Drugs: denies  Domestic Violence: no  Occupation:  works for [employer]    FAMILY HISTORY  BLEEDING or  CLOTTING DISORDERS: mother and brother (mom had leg DVT's, brother had PE)  BREAST CA: none  UTERINE CA: half-sister (same dad): dx'ed in her 40's at Stage 3, still living  OVARIAN CA: none  COLON CA: none    --------------------------------------------------------------------------------------------------------------    PHYSICAL EXAM  Vitals:    08/27/24 1336   BP: 122/78   Resp: 17     GEN = alert/oriented, nad, pleasant  HEENT = sclera anicteric, EOM grossly normal  BREASTS = deferred, no concerns  CV = BP and HR as per vitals  PULM = normal respiratory effort   = deferred, no acute concerns    ASSESSMENT AND PLAN:  49 y.o. with recent ultrasound showing two 4cm fibroids and a complex ovarian cyst.  We discussed our inability to know with certainty if something is malignant based on ultrasound and lab findings alone - only final pathologic evaluation can confirm.  Discussed the ultrasound  characteristics in her case are somewhat grey-zone.  Discussed options of watchful waiting with close ultrasound follow-up, surgery with a general gynecologist, or surgery with a GYN-Oncologist.  Notably, we had an entire visit and discussion thinking that her half-sister had Stage 3 ovarian cancer.  Only at the very end of the appointment did she get a text from her sister clarifying that she had Stage 3 endometrial cancer.  In any event, Kitty would like DEQUAN testing to potentially help guide her choices, and she's also interested in getting the opinion of a GYN-Oncologist.    f/u results of  DEQUAN panel  Referral to GYN-ONC placed    Cervical Cancer screening: up to date    Mammogram: up to date    RTC prn    Glenys Matthews MD

## 2024-08-28 ENCOUNTER — TELEPHONE (OUTPATIENT)
Dept: GYNECOLOGIC ONCOLOGY | Facility: CLINIC | Age: 49
End: 2024-08-28
Payer: COMMERCIAL

## 2024-08-28 NOTE — NURSING
"New referral received from Dr Matthews for recent diagnosis of fibroids and complex ovarian cyst. Pt called and name and  verified. Explained my role as nurse navigator and direct number provided. Options for GYN/ONC providers, all clinic locations and soonest availability discussed with pt. Pt requested the next available Friday [place] appointment. Pt scheduled to see Dr Thorpe in the next available appointment. Patient encouraged to call for any questions or concerns about her care or appointments. Pt verbalized understanding. Date time and location of appointment reviewed with pt.     Oncology Navigation   Intake  Cancer Type: Gynecologic (fibroids and left ovarian cyst)  Type of Referral: Internal (Dr Glenys Matthews)  Date of Referral: 24  Initial Nurse Navigator Contact: 24  Referral to Initial Contact Timeline (days): 1     Treatment  Current Status: Staging work-up  Procedures: Ultrasound  Concerns: Single, lives with daughter. 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 This recent ultrasound does show fibroids, and it also shows a "mildly complex cyst with internal septation."    3/25/24- pap= negative, HPV= negative    24- Ultrasound= Multiple uterine fibroids. 3.8 x 3.7 x 3.5 cm and 3.4 x 2.5 x 3.6 cm heterogeneous solid masses consistent with uterine fibroids. Nonvisualization the right ovary. Mildly complex left ovarian cyst. 1.5 x 1.8 x 1.8 cm mildly complex cyst with internal septation    24- DEQUAN testing collected, results pending     Acuity      Follow Up  No " follow-ups on file.

## 2024-08-29 LAB
CANCER AG125 SERPL IA-ACNC: 7 U/ML
HE4 SERPL-SCNC: 46 PMOL/L
ROMA SCORE POSTMEN SERPL: 0.64
ROMA SCORE PREMEN SERPL: 0.59

## 2024-08-30 ENCOUNTER — OFFICE VISIT (OUTPATIENT)
Dept: GYNECOLOGIC ONCOLOGY | Facility: CLINIC | Age: 49
End: 2024-08-30
Payer: COMMERCIAL

## 2024-08-30 VITALS
OXYGEN SATURATION: 99 % | WEIGHT: 245.56 LBS | RESPIRATION RATE: 16 BRPM | SYSTOLIC BLOOD PRESSURE: 122 MMHG | TEMPERATURE: 96 F | DIASTOLIC BLOOD PRESSURE: 68 MMHG | HEART RATE: 65 BPM | HEIGHT: 63 IN | BODY MASS INDEX: 43.51 KG/M2

## 2024-08-30 DIAGNOSIS — N83.209 CYST OF OVARY, UNSPECIFIED LATERALITY: ICD-10-CM

## 2024-08-30 DIAGNOSIS — N83.202 LEFT OVARIAN CYST: Primary | ICD-10-CM

## 2024-08-30 PROCEDURE — 99999 PR PBB SHADOW E&M-EST. PATIENT-LVL IV: CPT | Mod: PBBFAC,,, | Performed by: OBSTETRICS & GYNECOLOGY

## 2024-08-30 NOTE — PROGRESS NOTES
"REFERRING PROVIDER  Glenys Matthews MD     HISTORY OF PRESENT CONDITION  CC: complex left adnexal mass  Kitty Jennings is a 49 y.o.  referred to Dr. Cassie neves 2024 for abnormal ultrasound. Per Dr. Matthews note "She suspects she is perimenopausal, having missed periods in  and JUL.  However in AUG had 7 days of bleeding with moderate to heavy flow, followed by 7d of light bleeding.  Nothing for 3-5 days.  Friday had bright red again.  Tapered off and then none today. Reports having had a pelvic US in 2018 with a provider outside the [facility] system, and no one told her at that time that she had fibroids.  This recent ultrasound does show fibroids, and it also shows a mildly complex cyst with internal septation."    Data Reviewed  3/25/2024 Pap: NILM, HPV negative    2024 US Pelvis: Uterus: 8.4 x 3.9 x 5.5 cm.  3.8 x 3.7 x 3.5 cm and 3.4 x 2.5 x 3.6 cm heterogeneous solid masses consistent with uterine fibroids. Nabothian cysts also noted. Endometrium 6.3 mm. Right ovary: Not visualized Left ovary: Size: 3.2 x 1.7 x 2.7 cm with 1.5 x 1.8 x 1.8 cm mildly complex cyst with internal septation. Free Fluid: None.     2024 Labs:  = 7, HE4 = 46, DEQUAN 0.59 (<1.14 is low risk)      Past Medical History:   Diagnosis Date    Anemia     Diverticulosis     GERD (gastroesophageal reflux disease)     Hives of unknown origin     Hx of migraines     Hypertension       Current Outpatient Medications   Medication Sig    cetirizine (ZYRTEC) 10 MG tablet Take 2 tablets (20 mg total) by mouth once daily.    esomeprazole (NEXIUM) 40 MG capsule Take 1 capsule (40 mg total) by mouth before breakfast.    hydroCHLOROthiazide (HYDRODIURIL) 25 MG tablet Take 1 tablet (25 mg total) by mouth once daily.    olmesartan (BENICAR) 40 MG tablet Take 1 tablet (40 mg total) by mouth once daily.    propranoloL (INDERAL LA) 160 mg 24 hr capsule Take 1 capsule (160 mg total) by mouth once daily.     No current " facility-administered medications for this visit.     Review of patient's allergies indicates:  No Known Allergies    Past Surgical History:   Procedure Laterality Date    ABDOMINOPLASTY  06/2023    BARIATRIC SURGERY  09/11/2013    Gastric sleeve    COLONOSCOPY N/A 06/04/2021    Procedure: COLONOSCOPY;  Surgeon: Ana Oscar MD;  Location: [facility];  Service: Endoscopy;  Laterality: N/A;    LIPOMA RESECTION      Back of neck. Benign.    TOTAL REDUCTION MAMMOPLASTY Bilateral 2018    UPPER GASTROINTESTINAL ENDOSCOPY  2016    Dr. Reid; hiatal hernia per pt report        FAMILY HISTORY  Family History   Problem Relation Name Age of Onset    Hypertension Mother      Diabetes type II Mother      Diverticulosis Mother      Diverticulitis Mother      Hypertension Father      Heart disease Father      Diabetes type II Father      Hypertension Sister      Diverticulitis Sister      Hypertension Brother      Ovarian cancer Sister 1/2     No Known Problems Sister      No Known Problems Daughter      Colon cancer Neg Hx         SOCIAL HISTORY    reports that she has never smoked. She has been exposed to tobacco smoke. She has never used smokeless tobacco. She reports that she does not drink alcohol and does not use drugs.    REVIEW OF SYSTEMS  Review of Systems   Constitutional:  Negative for activity change, appetite change, chills, fatigue, fever and unexpected weight change.   Respiratory:  Negative for cough, chest tightness and shortness of breath.    Cardiovascular:  Negative for chest pain, palpitations and leg swelling.   Gastrointestinal:  Negative for abdominal distention, abdominal pain, blood in stool, constipation, diarrhea, nausea and vomiting.   Genitourinary:  Negative for dyspareunia, dysuria, frequency, hematuria, menstrual problem, pelvic pain, urgency, vaginal bleeding and vaginal discharge.   Musculoskeletal:  Negative for arthralgias and myalgias.   Skin:  Negative for color change and rash.    Neurological:  Negative for dizziness, weakness and light-headedness.   Hematological:  Negative for adenopathy.   Psychiatric/Behavioral:  Negative for decreased concentration, dysphoric mood and sleep disturbance. The patient is not nervous/anxious.        OBJECTIVE   Vitals:    08/30/24 0837   BP: 122/68   Pulse: 65   Resp: 16   Temp: 96.2 °F (35.7 °C)      Body mass index is 43.5 kg/m².     Physical Exam:   Constitutional: She is oriented to person, place, and time. She appears well-developed and well-nourished. No distress.    HENT:   Head: Normocephalic and atraumatic.    Eyes: Conjunctivae and EOM are normal.      Pulmonary/Chest: Effort normal. No respiratory distress.        Abdominal: Soft. She exhibits no distension and no mass. There is no abdominal tenderness. There is no rebound and no guarding. No hernia.                 Neurological: She is alert and oriented to person, place, and time.    Skin: No rash noted.    Psychiatric: She has a normal mood and affect. Her behavior is normal.     ECOG status: 0    LABORATORY DATA  Lab data reviewed.    RADIOLOGICAL DATA  Radiology data reviewed.    PATHOLOGY DATA  Pathology data reviewed.    ASSESSMENT    1. Left ovarian cyst    2. Cyst of ovary, unspecified laterality    50 yo with benign appearing minimally complex left ovarian cyst and small fibroids.  Out of an abundance of caution, a follow-up ultrasound in 2 - 3 months would not be unreasonable to insure stability or resolution of her cyst.  Her menses is consistent with a perimenopausal pattern, but if it becomes irregular beyond skipping months, endometrial sampling would be indicated.    Findings from the [facility] ovarian cancer screening trial demonstrated that the risk of malignancy in complex cystic ovarian tumors with septations, but without solid areas or papillations is extremely low.  A total of 2870 septated cystic ovarian tumors in 1319 women were followed with repeat  ultrasound in 4-6 month intervals for an average of 77 months, and no patient developed ovarian cancer in the ovary with the septal morphology (Martha et al. Gyn Onc 2010).    The Risk of Malignancy Algorithm (DEQUAN) has a high sensitivity (percent of patients with a malignant mass who had a positive test result) and negative predictive value (percent of patients with a negative test result who had a benign mass).  In a DEQUAN trial of 472 premenopausal patients, both were 100%.  In postmenopausal patients, sensitivity was 92% and NPV was 97%. Conversely, it lacks specificity (the percent of patients with a benign mass who had a negative test result) and positive predictive value (percent of patients with a positive test result who had a malignant mass).  Specificity was 74% and 76% in pre and postmenopausal patients respectively.  In short, a negative test is excellent at predicting the absence of disease and is largely where the value of the test lies.  A positive test is not very good at predicting the presence of disease.   (Armstrong et al., Obstet Gynecol. 2011)     PLAN  Follow-up with Dr. Matthews  Consider follow-up ultrasound in 2 - 3 months          Nando Thorpe MD

## 2024-09-09 ENCOUNTER — TELEPHONE (OUTPATIENT)
Dept: OBSTETRICS AND GYNECOLOGY | Facility: CLINIC | Age: 49
End: 2024-09-09
Payer: COMMERCIAL

## 2024-09-09 DIAGNOSIS — N83.209 CYST OF OVARY, UNSPECIFIED LATERALITY: Primary | ICD-10-CM

## 2024-09-09 NOTE — TELEPHONE ENCOUNTER
Spoke with pt to schedule pelvic ultrasound and follow up with Dr. Matthews in November. Ultrasound set for 11/22/24 and Dr. Matthews appt set for 11/29/24. Pt verbalized understanding.

## 2024-10-01 ENCOUNTER — TELEPHONE (OUTPATIENT)
Facility: CLINIC | Age: 49
End: 2024-10-01
Payer: COMMERCIAL

## 2024-10-01 DIAGNOSIS — K95.89 IRON DEFICIENCY ANEMIA FOLLOWING BARIATRIC SURGERY: Primary | ICD-10-CM

## 2024-10-01 DIAGNOSIS — D50.8 IRON DEFICIENCY ANEMIA FOLLOWING BARIATRIC SURGERY: Primary | ICD-10-CM

## 2024-10-04 ENCOUNTER — TELEPHONE (OUTPATIENT)
Facility: CLINIC | Age: 49
End: 2024-10-04
Payer: COMMERCIAL

## 2024-10-04 DIAGNOSIS — D51.8 ANEMIA OF DECREASED VITAMIN B12 ABSORPTION: Primary | ICD-10-CM

## 2024-10-08 ENCOUNTER — OFFICE VISIT (OUTPATIENT)
Facility: CLINIC | Age: 49
End: 2024-10-08
Payer: COMMERCIAL

## 2024-10-08 VITALS
TEMPERATURE: 98 F | HEART RATE: 54 BPM | RESPIRATION RATE: 18 BRPM | DIASTOLIC BLOOD PRESSURE: 84 MMHG | BODY MASS INDEX: 43.93 KG/M2 | WEIGHT: 248 LBS | SYSTOLIC BLOOD PRESSURE: 129 MMHG

## 2024-10-08 DIAGNOSIS — D50.8 IRON DEFICIENCY ANEMIA FOLLOWING BARIATRIC SURGERY: Primary | ICD-10-CM

## 2024-10-08 DIAGNOSIS — K95.89 IRON DEFICIENCY ANEMIA FOLLOWING BARIATRIC SURGERY: Primary | ICD-10-CM

## 2024-10-08 DIAGNOSIS — D51.8 DIETARY VITAMIN B12 DEFICIENCY ANEMIA: ICD-10-CM

## 2024-10-08 PROCEDURE — 3079F DIAST BP 80-89 MM HG: CPT | Mod: CPTII,S$GLB,, | Performed by: INTERNAL MEDICINE

## 2024-10-08 PROCEDURE — 3074F SYST BP LT 130 MM HG: CPT | Mod: CPTII,S$GLB,, | Performed by: INTERNAL MEDICINE

## 2024-10-08 PROCEDURE — 3008F BODY MASS INDEX DOCD: CPT | Mod: CPTII,S$GLB,, | Performed by: INTERNAL MEDICINE

## 2024-10-08 PROCEDURE — 1159F MED LIST DOCD IN RCRD: CPT | Mod: CPTII,S$GLB,, | Performed by: INTERNAL MEDICINE

## 2024-10-08 PROCEDURE — 3044F HG A1C LEVEL LT 7.0%: CPT | Mod: CPTII,S$GLB,, | Performed by: INTERNAL MEDICINE

## 2024-10-08 PROCEDURE — 99215 OFFICE O/P EST HI 40 MIN: CPT | Mod: S$GLB,,, | Performed by: INTERNAL MEDICINE

## 2024-10-08 PROCEDURE — G2211 COMPLEX E/M VISIT ADD ON: HCPCS | Mod: S$GLB,,, | Performed by: INTERNAL MEDICINE

## 2024-10-08 PROCEDURE — 99999 PR PBB SHADOW E&M-EST. PATIENT-LVL III: CPT | Mod: PBBFAC,,, | Performed by: INTERNAL MEDICINE

## 2024-10-08 PROCEDURE — 4010F ACE/ARB THERAPY RXD/TAKEN: CPT | Mod: CPTII,S$GLB,, | Performed by: INTERNAL MEDICINE

## 2024-10-08 NOTE — PROGRESS NOTES
"[facility] hematology Oncology in office follow-up progress note    10/8/24    Subjective:      Patient ID:   Kitty Jennings  49 y.o. female  1975  Cecilia White NP      Chief Complaint:   Anemia eval.    HPI:  49 y.o. female with hx of Fe deficiency anemia, treated with oral Fe in the past.  Admits to fatigue decreasing.  Energy 7/10.  More time out of bed after a days work.  Intermittent hives since age 19.  Zertec and benadryl prn.  Dr. Chavez feels hives may be stress induced.  She does not get hives on the weekend, away from her job duties.    S/P gastric sleave surgery.  S/P Injectafer.  Hgb 13.2.  B 6 resolved peripheral neuropathy.  B 6 4 to 53 and now at 2.8.  Resume B 6 daily.  B 12 is 297.  She has not been taking her B 12 or B 6 supplements.  Resume  B 12 and B6 po now.    Now 23, Hgb 11.8, Ferritin 115 to 80.  B 12 463 to 330 to 254, she will resume her subl B 12.  Vit D is 10-12.  Begin Vit D 2-3,000 units daily.      Stronger.  Hive sx have improved.  She saw Dr. Chavez.  Measures taken to control hives sx.  Hives sx controlled with Zertec 20 mg daily.    She had 360 Lipo procedure and "tummy tuck" on 23.  Hgb now at 11.  Downward trend could be due to blood loss with surgery and last light menses.  Should  recover with compensatory RBC production in bone marrow.     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  Smoke no.  ETOH no. Allergy no.    Hx HTN, GERD, migraine HA.  Menses NL flow.  She has been sleeping poorly, under stress, caring for her mother, in failing health.    Gastric sleave surgery 13.  mammogram on 21 [facility]. Neg for Cancer.    Colonoscopy 2022 diverticulosis.    M0  Menses onset 11  1st live child at 22    No family hx of anemia.  Dad HTN, DM  Mom A & W  Sister HTN x's 2    Her daughter had covid, pt did not.  Pt has gotten 2/2 vaccines, booster, but not the flu shot.    She has history of iron deficiency and B12 deficiency anemia.  She " received and Injectafer infusion x2 in April and May of 2024.  Hemoglobin has improved from 11-12.7 and her ferritin level has improved from 8 to 293, upper limits of normal.  She does take B12 sublingually but is noncompliant in taking it regularly.  I have asked her to try and take her B12 sublingual daily.  She also has a history of vitamin-D deficiency and is on vitamin-D supplementation.    ROS:   GEN: normal without any fever, night sweats or weight loss  HEENT: normal with no HA's, sore throat, stiff neck, changes in vision  CV: normal with no CP, SOB, PND, KAM or orthopnea  PULM: normal with no SOB, cough, hemoptysis, sputum or pleuritic pain  GI: normal with no abdominal pain, nausea, vomiting, constipation, diarrhea, melanotic stools, BRBPR, or hematemesis  : normal with no hematuria, dysuria  BREAST: normal with no mass, discharge, pain  SKIN: see HPI      Review of patient's allergies indicates:  No Known Allergies        Current Outpatient Medications:     cetirizine (ZYRTEC) 10 MG tablet, Take 2 tablets (20 mg total) by mouth once daily., Disp: 180 tablet, Rfl: 3    esomeprazole (NEXIUM) 40 MG capsule, Take 1 capsule (40 mg total) by mouth before breakfast., Disp: 90 capsule, Rfl: 1    hydroCHLOROthiazide (HYDRODIURIL) 25 MG tablet, Take 1 tablet (25 mg total) by mouth once daily., Disp: 90 tablet, Rfl: 1    olmesartan (BENICAR) 40 MG tablet, Take 1 tablet (40 mg total) by mouth once daily., Disp: 90 tablet, Rfl: 1    propranoloL (INDERAL LA) 160 mg 24 hr capsule, Take 1 capsule (160 mg total) by mouth once daily., Disp: 90 capsule, Rfl: 1          Objective:   Vitals:  Blood pressure 129/84, pulse (!) 54, temperature 97.6 °F (36.4 °C), temperature source Temporal, resp. rate 18, weight 112.5 kg (248 lb).        Ferritin stable at 92. After Injectafer infusions in past.  She takes B 12 subl daily, level at 607.  Vitamin D at 22, on 2000 units daily.  Hgb 11.Now ferritin is 8.  Assessment:   (1) 49  y.o. female with diagnosis of Fe deficiency 2nd decreased absorption 2nd bariatric surgery.  Gave  Injectafer  Weekly x's 2 to replenish Fe stores.  Estimated 1-2% risk of reaction, she tolerated it well.  [facility].  Anemia resolved to NL, with Fe replenishment.    (2)Referred to Dr. Lori Chavez of allergy/ immunology to try clarify hives and Rx. Hives controlled with Zertec 20 mg daily.    On B 6 po, PN sx in feet resolved.    Given Injectafer infusion x's 2 at . 4/2024.  Continue B 12 subl daily.  Stressed compliance in taking B 12 subl daily.   Vitamin D 4000 units daily  Continue multivitamin daily.    Hgb 11 to 12.7.  Normalized, no longer anemic.  Ferritin is gone from 8 up to 293, high normal.  RTC 6 months with CBC, ferritin, B 12.

## 2024-10-08 NOTE — LETTER
October 8, 2024        Cecilia White, FNP  [address]  [address]  [place] [address]             [facility] - Hematology Oncology  [address]  [address]  [place] [address]  Phone: [phone]  Fax: [phone]   Patient: Kitty Jennings   MR Number: [id number]   YOB: 1975   Date of Visit: 10/8/2024       Dear Dr. White:    Thank you for referring Kitty Jennings to me for evaluation. Below are the relevant portions of my assessment and plan of care.            If you have questions, please do not hesitate to call me. I look forward to following Kitty along with you.    Sincerely,      MARCIA Rolle MD           CC    No Recipients

## 2024-10-10 ENCOUNTER — TELEPHONE (OUTPATIENT)
Dept: SURGERY | Facility: CLINIC | Age: 49
End: 2024-10-10
Payer: COMMERCIAL

## 2024-11-22 ENCOUNTER — HOSPITAL ENCOUNTER (OUTPATIENT)
Dept: RADIOLOGY | Facility: HOSPITAL | Age: 49
Discharge: HOME | End: 2024-11-22
Attending: GENERAL PRACTICE
Payer: COMMERCIAL

## 2024-11-22 DIAGNOSIS — N83.209 CYST OF OVARY, UNSPECIFIED LATERALITY: ICD-10-CM

## 2024-11-22 PROCEDURE — 76856 US EXAM PELVIC COMPLETE: CPT | Mod: 26,,, | Performed by: RADIOLOGY

## 2024-11-22 PROCEDURE — 76856 US EXAM PELVIC COMPLETE: CPT | Mod: TC,PO

## 2024-11-22 PROCEDURE — 76830 TRANSVAGINAL US NON-OB: CPT | Mod: 26,,, | Performed by: RADIOLOGY

## 2024-11-25 ENCOUNTER — OFFICE VISIT (OUTPATIENT)
Dept: OBSTETRICS AND GYNECOLOGY | Facility: CLINIC | Age: 49
End: 2024-11-25
Payer: COMMERCIAL

## 2024-11-25 VITALS
WEIGHT: 245.38 LBS | BODY MASS INDEX: 43.48 KG/M2 | DIASTOLIC BLOOD PRESSURE: 68 MMHG | SYSTOLIC BLOOD PRESSURE: 116 MMHG | HEIGHT: 63 IN

## 2024-11-25 DIAGNOSIS — Z12.31 SCREENING MAMMOGRAM FOR BREAST CANCER: ICD-10-CM

## 2024-11-25 DIAGNOSIS — D21.9 LEIOMYOMA: ICD-10-CM

## 2024-11-25 DIAGNOSIS — N83.209 CYST OF OVARY, UNSPECIFIED LATERALITY: Primary | ICD-10-CM

## 2024-11-25 PROCEDURE — 99999 PR PBB SHADOW E&M-EST. PATIENT-LVL III: CPT | Mod: PBBFAC,,, | Performed by: GENERAL PRACTICE

## 2024-11-25 NOTE — PROGRESS NOTES
"  Background  2024  Kitty Jennings is a 49 y.o. here for a discussion about recent ultrasound findings.  She suspects she is perimenopausal, having missed periods in  and JUL.  However in AUG had 7 days of bleeding with moderate to heavy flow, followed by 7d of light bleeding.  Nothing for 3-5 days.  Friday had bright red again.  Tapered off and then none today.     Reports having had a pelvic US in 2018 with a provider outside the [facility] system, and no one told her at that time that she had fibroids.  This recent ultrasound does show fibroids, and it also shows a "mildly complex cyst with internal septation."    She had DEQUAN testing and saw Dr. Thorpe in GYN-ONC who advised watchful waiting was resonable.       SUBJECTIVE   2024  Kitty Jennings is a 49 y.o. here for discussion about recent f/u pelvic ultrasound and to see what her bleeding patterns have been.  She says her periods have been very normal.  Occurring monthly, 4-5d of flow with 2-3 of them heavy, product change every 4hrs or so.     G'sP's:   LMP: Patient's last menstrual period was 2024 (approximate).   Relationship: single and not having sex  Contraception: abstinence  PAP Hx: no h/o abnormals  LAST PAP: PAP neg / HPV neg / Date: 3/25/2024  MMG (24) = negative     ASSESSMENT / PLAN   49 y.o. with resolution of complex ovarian cyst and stable fibroid uterus (studies below).  Normal menses.    Discussed reasons to RTC (AUB, pain, etc.)  Cervical Cancer screening: next cervical CA screen (PAP+HPV) due MAR 2029  Mammogram: ordered and scheduled today  Return to clinic: for annual GYN check-up, sooner prn    OBJECTIVE   Vitals:    24 1005   BP: 116/68     GEN = alert/oriented, nad, pleasant  HEENT = sclera anicteric, EOM grossly normal  CV = BP and HR as per vitals  PULM = normal respiratory effort   = deferred, no concerns    LABS & RADS   PELVIC US (2024) =  Uterus 9 x 4 x 5 cm  EMS 5 mm  ROV 2 x " 2 x 1 cm  LOV 1 x 1 x 1 cm  Other:  -Resolution of complex cystic lesion along the left adnexa    For clarification there are 3 fibroids along the posterior uterine wall.  These are best seen on the sagittal cine series 45, and transverse cine series 56.  Along the right fundus an intramural lesion measures 3.7 cm.  Along the mid uterus posterior wall, lesion extending to the right of midline is subserosal and measures 5 cm.  Another adjacent subserosal lesion extending to the left of midline measures 3 cm.     All of these lesions were present on the previous comparison with sizes of 3.4 cm, 4.9 cm and 2.9 cm respectively (fundal, right posterior mid wall, left posterior mid wall), when attempting to replicate exact measurements.    PELVIC US (8/26/24) =  Uterus 8 x 4 x 6 cm  EMS 6.3 mm  ROV not seen  LOV 3 x 2 x 3 cm, 1.5 x 1.8 x 1.8 cm mildly complex cyst with internal septation   Other: two fibroids ~4cm each    10/4/24  WBC 4.8 - 10.8 omid/L 6.4   Hemoglobin 12.0 - 15.0 g/dL 12.7   Hematocrit 37.0 - 47.0 % 37.6   MCV 81 - 97 fL 91   Platelets 150 - 400 omid/L 279     Ferritin: 293    8/27/2024:  DEQUAN, Human Epididymis Protein 4 <=140 pmol/L 46   DEQUAN, Cancer Antigen 125 <46 U/mL 7   DEQUAN, Premenopausal  0.59   DEQUAN value < 1.14 indicates a low risk of finding epithelial ovarian cancer at surgery.      Glenys Matthews MD    -----------------------  8/27/2024:  GYNECOLOGIC HISTORY  Cervical CA Screen / Infectious   PAP Hx: no h/o abnormals  Genital HSV: no  STD Hx: no past history   Bleeding   Menarche: 11 yoa  Period duration: 3-5 days  # Heavy Days: 1-2  Pad/tampon ? on heavy days: 3x  Intermenstrual bleeding: No  Period frequency:regular every 28-30 days   Pain   Dysmenorrhea: No  Non-menstrual pelvic pressure/pain: No  Dyspareunia: N/A   Other   Vasomotor Sxs: denies  Vaginal dryness: denies      OBSTETRIC HISTORY  Living children: 1  Vaginal deliveries: 1     PAST MEDICAL HISTORY       -------------------------------------    Anemia    Diverticulosis    GERD (gastroesophageal reflux disease)    Hives of unknown origin    Hx of migraines    Hypertension   Fibroids   BMI 43     PAST SURGICAL HISTORY      ----------------------------    Abdominoplasty    Bariatric surgery     Gastric sleeve    Colonoscopy     Procedure: COLONOSCOPY;  Surgeon: Ana Oscar MD;  Location: [facility];  Service: Endoscopy;  Laterality: N/A;    Lipoma resection     Back of neck. Benign.    Total reduction mammoplasty    Upper gastrointestinal endoscopy     Dr. Reid; hiatal hernia per pt report      ALLERGIES  Review of patient's allergies indicates:  No Known Allergies     MEDICATIONS       Current Outpatient Medications   Medication Instructions    cetirizine (ZYRTEC) 20 mg, Oral, Daily    esomeprazole (NEXIUM) 40 mg, Oral, Before breakfast    hydroCHLOROthiazide (HYDRODIURIL) 25 mg, Oral, Daily    olmesartan (BENICAR) 40 mg, Oral, Daily    propranoloL (INDERAL LA) 160 mg, Oral, Daily      SOCIAL HISTORY  Lives with: daughter  Smoker: non-smoker  Alcohol: yes, socially  Drugs: denies  Domestic Violence: no  Occupation:  works for [email or web address]     FAMILY HISTORY  BLEEDING or  CLOTTING DISORDERS: mother and brother (mom had leg DVT's, brother had PE)  BREAST CA: none  UTERINE CA: half-sister (same dad): dx'ed in her 40's at Stage 3, still living  OVARIAN CA: none  COLON CA: none

## 2024-12-16 ENCOUNTER — OFFICE VISIT (OUTPATIENT)
Dept: BARIATRICS | Facility: CLINIC | Age: 49
End: 2024-12-16
Payer: COMMERCIAL

## 2024-12-16 VITALS
HEART RATE: 58 BPM | RESPIRATION RATE: 16 BRPM | WEIGHT: 249 LBS | BODY MASS INDEX: 44.12 KG/M2 | DIASTOLIC BLOOD PRESSURE: 65 MMHG | TEMPERATURE: 98 F | HEIGHT: 63 IN | SYSTOLIC BLOOD PRESSURE: 116 MMHG

## 2024-12-16 DIAGNOSIS — E66.01 MORBID OBESITY: Primary | ICD-10-CM

## 2024-12-16 DIAGNOSIS — Z90.3 HISTORY OF SLEEVE GASTRECTOMY: ICD-10-CM

## 2024-12-16 PROCEDURE — 4010F ACE/ARB THERAPY RXD/TAKEN: CPT | Mod: CPTII,S$GLB,, | Performed by: NURSE PRACTITIONER

## 2024-12-16 PROCEDURE — 99204 OFFICE O/P NEW MOD 45 MIN: CPT | Mod: S$GLB,,, | Performed by: NURSE PRACTITIONER

## 2024-12-16 PROCEDURE — 3008F BODY MASS INDEX DOCD: CPT | Mod: CPTII,S$GLB,, | Performed by: NURSE PRACTITIONER

## 2024-12-16 PROCEDURE — 3074F SYST BP LT 130 MM HG: CPT | Mod: CPTII,S$GLB,, | Performed by: NURSE PRACTITIONER

## 2024-12-16 PROCEDURE — 3044F HG A1C LEVEL LT 7.0%: CPT | Mod: CPTII,S$GLB,, | Performed by: NURSE PRACTITIONER

## 2024-12-16 PROCEDURE — 1159F MED LIST DOCD IN RCRD: CPT | Mod: CPTII,S$GLB,, | Performed by: NURSE PRACTITIONER

## 2024-12-16 PROCEDURE — 99999 PR PBB SHADOW E&M-EST. PATIENT-LVL III: CPT | Mod: PBBFAC,,, | Performed by: NURSE PRACTITIONER

## 2024-12-16 PROCEDURE — 3078F DIAST BP <80 MM HG: CPT | Mod: CPTII,S$GLB,, | Performed by: NURSE PRACTITIONER

## 2024-12-16 PROCEDURE — 1160F RVW MEDS BY RX/DR IN RCRD: CPT | Mod: CPTII,S$GLB,, | Performed by: NURSE PRACTITIONER

## 2024-12-16 NOTE — PROGRESS NOTES
Initial Consult    Chief Complaint   Patient presents with    Obesity    Nutrition Counseling       History of Present Illness:  Patient is a 49 y.o. female who is referred for evaluation of surgical treatment of morbid obesity. Her Body mass index is 44.11 kg/m². She has known comorbidities of GERD, hypertension, and HX of Gastric Sleeve 2013 . She has attended the bariatric seminar and is most interested in  Medical Weight Loss .      Past attempts at weight loss include:   Unsupervised: Slim Fast, Weight Watchers, Fat Smash diet  Supervised:  Maris Rasmussen, Weight Watchers, Medical Wt Loss before bariatric surgery  Diet pills: phentermine, topamax  Exercise attempts: walking, treadmill, home gym equipment, taebo    Weight history:   At current weight:  5 years  Obese for 20 years .  More than 35 pounds overweight for 27 years.  More than 100 pounds overweight for 20 years.  Started dieting at 21 years old.  Maximum weight reached: 290 lbs  Most weight lost was 100 lbs through Gastric Sleeve for 6 months.  She describes Her eating habits as skipping, emotional.    ACOSTA screening: none    Reflux screening: hx with use of medications      Past Medical History:   Diagnosis Date    Anemia     Diverticulosis     GERD (gastroesophageal reflux disease)     Hives of unknown origin     Hx of migraines     Hypertension      Past Surgical History:   Procedure Laterality Date    ABDOMINOPLASTY  06/2023    BARIATRIC SURGERY  09/11/2013    Gastric sleeve    COLONOSCOPY N/A 06/04/2021    Procedure: COLONOSCOPY;  Surgeon: Ana Oscar MD;  Location: [facility];  Service: Endoscopy;  Laterality: N/A;    LIPOMA RESECTION      Back of neck. Benign.    TOTAL REDUCTION MAMMOPLASTY Bilateral 2018    UPPER GASTROINTESTINAL ENDOSCOPY  2016    Dr. Reid; hiatal hernia per pt report     Family History   Problem Relation Name Age of Onset    Hypertension Mother      Diabetes type II Mother      Diverticulosis Mother      Diverticulitis  "Mother      Hypertension Father      Heart disease Father      Diabetes type II Father      Hypertension Sister      Diverticulitis Sister      Hypertension Brother      Ovarian cancer Sister 1/2     No Known Problems Sister      No Known Problems Daughter      Colon cancer Neg Hx       Social History     Tobacco Use    Smoking status: Never     Passive exposure: Past    Smokeless tobacco: Never   Substance Use Topics    Alcohol use: No    Drug use: No        Review of patient's allergies indicates:  No Known Allergies    Current Outpatient Medications   Medication Sig Dispense Refill    cetirizine (ZYRTEC) 10 MG tablet Take 2 tablets (20 mg total) by mouth once daily. 180 tablet 3    esomeprazole (NEXIUM) 40 MG capsule Take 1 capsule (40 mg total) by mouth before breakfast. 90 capsule 1    hydroCHLOROthiazide (HYDRODIURIL) 25 MG tablet Take 1 tablet (25 mg total) by mouth once daily. 90 tablet 1    olmesartan (BENICAR) 40 MG tablet Take 1 tablet (40 mg total) by mouth once daily. 90 tablet 1    propranoloL (INDERAL LA) 160 mg 24 hr capsule Take 1 capsule (160 mg total) by mouth once daily. 90 capsule 1     No current facility-administered medications for this visit.         Chart review:  Primary Care Physician: Cindy      Lab review:  Most Recent Data:  CBC:   Lab Results   Component Value Date    WBC 6.59 03/19/2024    HGB 11.3 (L) 03/19/2024    HCT 35.4 (L) 03/19/2024     03/19/2024    MCV 86 03/19/2024    RDW 12.9 03/19/2024       BMP:   Lab Results   Component Value Date     08/16/2024    K 3.6 08/16/2024     08/16/2024    CO2 24 08/16/2024    BUN 17 08/16/2024    CREATININE 0.8 08/16/2024    GLU 86 08/16/2024    CALCIUM 9.7 08/16/2024    MG 1.7 04/23/2021     LFTs:   Lab Results   Component Value Date    PROT 7.3 08/16/2024    ALBUMIN 3.6 08/16/2024    BILITOT 0.6 08/16/2024    AST 15 08/16/2024    ALKPHOS 57 08/16/2024    ALT 16 08/16/2024     Coags: No results found for: "INR", "PROTIME", " ""PTT"  FLP:   Lab Results   Component Value Date    CHOL 173 03/15/2024    HDL 50 03/15/2024    LDLCALC 95.0 03/15/2024    TRIG 140 03/15/2024    CHOLHDL 28.9 03/15/2024     DM:   Lab Results   Component Value Date    HGBA1C 5.5 08/16/2024    HGBA1C 6.3 (H) 03/15/2024    LDLCALC 95.0 03/15/2024    CREATININE 0.8 08/16/2024     Thyroid:   Lab Results   Component Value Date    TSH 1.029 03/15/2024     Anemia:   Lab Results   Component Value Date    FERRITIN 8.2 (L) 03/19/2024    EZEBPXWD73 607 07/14/2023    FOLATE 5.5 10/02/2020     Cardiac: No results found for: "TROPONINI", "CKTOTAL", "CKMB", "BNP"  Urinalysis:   Lab Results   Component Value Date    COLORU Yellow 04/25/2023    SPECGRAV 1.020 04/25/2023    NITRITE Negative 04/25/2023    KETONESU Negative 04/25/2023    UROBILINOGEN Negative 04/25/2023         Radiology review:      Other Results:  EKG (my interpretation): normal sinus rhythm.    Review of Systems:  Review of Systems   Constitutional:  Positive for activity change, appetite change and unexpected weight change.   Gastrointestinal:         Gerd   All other systems reviewed and are negative.      Diet Education Discussed: Recommend high protein, low carb meals- mainly meats and vegetables.    Breakfast:  premier protein shake (when out-skips meal)- no appetite in AM  Lunch:  skip typically- lean cuisine, pre-made salad  Dinner:  Fried Catfish from store pre-made & broccoli cheese/rice,   Rarely eat a plate of food  Water: 2 bottles (16 oz)-   PJ coffee- protein blended  Sweet Tea with no ice  SF green tea  Drink with straw  Not eating/drinking together- hours before drink      MVI:   B12  Vit D  Biotin  MVI without iron    Exercise: Recommend cardiovascular exercise, get HR over 100 for 20 minutes 3 times per week  none      Physical:     Vital Signs (Most Recent)  Temp: 98 °F (36.7 °C) (12/16/24 1441)  Pulse: (!) 58 (12/16/24 1441)  Resp: 16 (12/16/24 1441)  BP: 116/65 (12/16/24 1441)  5' 3" (1.6 " m)  112.9 kg (249 lb 0.2 oz)     Body comp:  Fat Percent:  49.7 %  Fat Mass:  121.8 lb  FFM:  123.2 lb  TBW: 99 lb  TBW %:  36.7 %  BMR: 1763 kcal    Wt Readings from Last 5 Encounters:   12/16/24 112.9 kg (249 lb 0.2 oz)   11/25/24 111.3 kg (245 lb 6 oz)   10/08/24 112.5 kg (248 lb)   08/30/24 111.4 kg (245 lb 9.5 oz)   08/27/24 111.1 kg (245 lb 0.7 oz)         Physical Exam:  Physical Exam  Vitals and nursing note reviewed.   Constitutional:       General: She is not in acute distress.     Appearance: Normal appearance. She is obese. She is not ill-appearing or toxic-appearing.   HENT:      Head: Normocephalic and atraumatic.      Right Ear: External ear normal.      Left Ear: External ear normal.      Nose: Nose normal. No congestion or rhinorrhea.      Mouth/Throat:      Mouth: Mucous membranes are moist.      Pharynx: Oropharynx is clear.   Eyes:      General:         Right eye: No discharge.         Left eye: No discharge.      Conjunctiva/sclera: Conjunctivae normal.   Cardiovascular:      Rate and Rhythm: Normal rate.      Pulses: Normal pulses.   Pulmonary:      Effort: Pulmonary effort is normal. No respiratory distress.   Musculoskeletal:         General: No swelling, tenderness, deformity or signs of injury. Normal range of motion.      Right lower leg: No edema.      Left lower leg: No edema.   Skin:     General: Skin is warm and dry.      Capillary Refill: Capillary refill takes less than 2 seconds.      Coloration: Skin is not jaundiced or pale.      Findings: No bruising, erythema or rash.   Neurological:      General: No focal deficit present.      Mental Status: She is alert and oriented to person, place, and time.      Motor: No weakness.      Gait: Gait normal.   Psychiatric:         Mood and Affect: Mood normal.         Behavior: Behavior normal.         Thought Content: Thought content normal.         Judgment: Judgment normal.     ASSESSMENT/PLAN:        1. Morbid obesity        2. BMI  40.0-44.9, adult        3. History of sleeve gastrectomy            Behavior or Diet Goals for this patient:  Reviewed [place] with Patient  Continue prescribed home medications  Discussed dietary changes  Do not skip meals- replace with protein shake  Creat a better work/eating plan  Increase Protein to 60 gm/day at minimum  Transition to Low calorie breads- nenita Yadav  Track foods with MEL- 1/2 daily intake should be protein  Activity/Exercise  Increase activity- Walk immediately after work        Plan:  Kitty Jennings has morbid obesity as their Body mass index is 44.11 kg/m². She would benefit from weight loss surgery and has chosen  Medical Weight Loss  as the preferred procedure. She understands that this is a tool and lifestyle change will be necessary to keep weight off.     Discussed Previous Weight Loss Attempts & PO vs Injectables for MWL  Topiramate- before bariatric surgery, did not like side effects with 'brain fog'  Phentermine/Phendimetrazine or other Amphetamines  EKG reviewed  Previous use before bariatric surgery with minimal change  Bariatric Surgery  Gastric Sleeve 9/11/2013 with 100 lb weight loss    Injectables- not covered without 3 weight loss attempts  Medical Weight Loss Plan  1/2 tablet of Phentermine (37.5 mg) per day x 1 month  Create a better work/eating plan  Do not skip meals

## 2024-12-17 ENCOUNTER — PATIENT MESSAGE (OUTPATIENT)
Dept: SURGERY | Facility: CLINIC | Age: 49
End: 2024-12-17
Payer: COMMERCIAL

## 2024-12-17 RX ORDER — PHENTERMINE HYDROCHLORIDE 37.5 MG/1
18.75 TABLET ORAL
Qty: 30 TABLET | Refills: 0 | Status: SHIPPED | OUTPATIENT
Start: 2024-12-17 | End: 2025-02-15

## 2025-02-03 ENCOUNTER — TELEPHONE (OUTPATIENT)
Dept: FAMILY MEDICINE | Facility: CLINIC | Age: 50
End: 2025-02-03
Payer: COMMERCIAL

## 2025-02-03 NOTE — TELEPHONE ENCOUNTER
Called [facility] and spoke to Phoebe- she is going to contact patient to reschedule patient. The mammogram was ordered by .

## 2025-02-03 NOTE — TELEPHONE ENCOUNTER
----- Message from Heri sent at 2/3/2025  8:33 AM CST -----  Type: Needs Testing Orders  Who Called:  pt  Best Call Back Number: [phone]  Additional Information: pt is calling the office to have her mammo re-ordered as she had to miss her appt on Friday due to work. Please call back to advise. Thanks!

## 2025-02-04 ENCOUNTER — HOSPITAL ENCOUNTER (OUTPATIENT)
Dept: RADIOLOGY | Facility: HOSPITAL | Age: 50
Discharge: HOME | End: 2025-02-04
Attending: GENERAL PRACTICE
Payer: COMMERCIAL

## 2025-02-04 DIAGNOSIS — Z12.31 SCREENING MAMMOGRAM FOR BREAST CANCER: ICD-10-CM

## 2025-02-04 PROCEDURE — 77063 BREAST TOMOSYNTHESIS BI: CPT | Mod: 26,,, | Performed by: RADIOLOGY

## 2025-02-04 PROCEDURE — 77067 SCR MAMMO BI INCL CAD: CPT | Mod: 26,,, | Performed by: RADIOLOGY

## 2025-02-04 PROCEDURE — 77067 SCR MAMMO BI INCL CAD: CPT | Mod: TC

## 2025-02-10 ENCOUNTER — PATIENT MESSAGE (OUTPATIENT)
Dept: BARIATRICS | Facility: CLINIC | Age: 50
End: 2025-02-10

## 2025-02-10 ENCOUNTER — OFFICE VISIT (OUTPATIENT)
Dept: BARIATRICS | Facility: CLINIC | Age: 50
End: 2025-02-10
Payer: COMMERCIAL

## 2025-02-10 VITALS
TEMPERATURE: 98 F | SYSTOLIC BLOOD PRESSURE: 103 MMHG | HEART RATE: 77 BPM | BODY MASS INDEX: 43.77 KG/M2 | RESPIRATION RATE: 16 BRPM | WEIGHT: 247 LBS | DIASTOLIC BLOOD PRESSURE: 62 MMHG | HEIGHT: 63 IN

## 2025-02-10 DIAGNOSIS — Z90.3 HISTORY OF SLEEVE GASTRECTOMY: ICD-10-CM

## 2025-02-10 DIAGNOSIS — E66.01 MORBID OBESITY: Primary | ICD-10-CM

## 2025-02-10 PROCEDURE — 3078F DIAST BP <80 MM HG: CPT | Mod: CPTII,S$GLB,, | Performed by: NURSE PRACTITIONER

## 2025-02-10 PROCEDURE — 4010F ACE/ARB THERAPY RXD/TAKEN: CPT | Mod: CPTII,S$GLB,, | Performed by: NURSE PRACTITIONER

## 2025-02-10 PROCEDURE — 3008F BODY MASS INDEX DOCD: CPT | Mod: CPTII,S$GLB,, | Performed by: NURSE PRACTITIONER

## 2025-02-10 PROCEDURE — 99214 OFFICE O/P EST MOD 30 MIN: CPT | Mod: S$GLB,,, | Performed by: NURSE PRACTITIONER

## 2025-02-10 PROCEDURE — 3074F SYST BP LT 130 MM HG: CPT | Mod: CPTII,S$GLB,, | Performed by: NURSE PRACTITIONER

## 2025-02-10 PROCEDURE — 1159F MED LIST DOCD IN RCRD: CPT | Mod: CPTII,S$GLB,, | Performed by: NURSE PRACTITIONER

## 2025-02-10 PROCEDURE — 99999 PR PBB SHADOW E&M-EST. PATIENT-LVL III: CPT | Mod: PBBFAC,,, | Performed by: NURSE PRACTITIONER

## 2025-02-10 PROCEDURE — 1160F RVW MEDS BY RX/DR IN RCRD: CPT | Mod: CPTII,S$GLB,, | Performed by: NURSE PRACTITIONER

## 2025-02-10 RX ORDER — SEMAGLUTIDE 0.25 MG/.5ML
0.25 INJECTION, SOLUTION SUBCUTANEOUS
Qty: 2 ML | Refills: 0 | Status: SHIPPED | OUTPATIENT
Start: 2025-02-10

## 2025-02-10 NOTE — PROGRESS NOTES
Medically Supervised Weight Loss Documentation    Date of Visit: 02/10/2025    Patient: Kitty Jennings    Beginning Weight: 249    Last Weight: 249    Current Weight: 247 Current BMI: Body mass index is 43.75 kg/m².  Weight Change: -2              Vitals:   Vitals:    02/10/25 1002   BP: 103/62   Pulse: 77   Resp: 16   Temp: 98.1 °F (36.7 °C)       Comorbidities:   Past Medical History:   Diagnosis Date    Anemia     Diverticulosis     GERD (gastroesophageal reflux disease)     Hives of unknown origin     Hx of migraines     Hypertension        Medications:  Current Outpatient Medications on File Prior to Visit   Medication Sig Dispense Refill    cetirizine (ZYRTEC) 10 MG tablet Take 2 tablets (20 mg total) by mouth once daily. 180 tablet 3    esomeprazole (NEXIUM) 40 MG capsule Take 1 capsule (40 mg total) by mouth before breakfast. 90 capsule 1    hydroCHLOROthiazide (HYDRODIURIL) 25 MG tablet Take 1 tablet (25 mg total) by mouth once daily. 90 tablet 1    olmesartan (BENICAR) 40 MG tablet Take 1 tablet (40 mg total) by mouth once daily. 90 tablet 1    propranoloL (INDERAL LA) 160 mg 24 hr capsule Take 1 capsule (160 mg total) by mouth once daily. 90 capsule 1    [DISCONTINUED] phentermine (ADIPEX-P) 37.5 mg tablet Take 0.5 tablets (18.75 mg total) by mouth before breakfast. 30 tablet 0     No current facility-administered medications on file prior to visit.         Tanita Scale Body Composition:  Fat Percent:  45.9 %  Fat Mass:  113.4 lb  FFM:  133.6 lb  TBW: 97.6 lb  TBW %:  39.5 %  BMR: 1881 kcal    Diet Education Discussed: Recommend high protein, low carb meals- mainly meats and vegetables.    Breakfast:  premier protein shake (when out-skips meal)- no appetite in AM  Lunch:  skip typically- lean cuisine, pre-made salad  Dinner:  Fried Catfish from store pre-made & broccoli cheese/rice,   Rarely eat a plate of food  Water: 2 bottles (16 oz)-   PJ coffee- protein blended  Sweet Tea with no ice  SF green  tea  Drink with straw  Not eating/drinking together- hours before drink      MVI:   B12  Vit D  Biotin  MVI without iron    Exercise: Recommend cardiovascular exercise, get HR over 100 for 20 minutes 3 times per week  none    Behavorial/Counseling Plan for patient:  Falls reviewed with patient  Continue prescribed medications  Tanita scale results reviewed with patient  Decreased 8 lb fat  Decreased 10 lb muscle  Increased 5 lb water  Discussed muscle vs fat   Do not exceed 1600 calories/day  Diet: continue protocol  Focus on small, frequent meals with eating high protein, low carb  Clear, protein powder added to water, typically 20 gm protein- brands like Isopure Fusion, Oath, Seeq  Unflavored protein, Isopure, can add 25g to foods   Low carb, high protein foods  Do not skip meals- replace with protein shake  Creat a better work/eating plan  Increase Protein to 60 gm/day at minimum  Transition to Low calorie breads- nenita Yadav  Track foods with MEL- 1/2 daily intake should be protein  Exercise:   Increase activity as much as possible- walk for part of lunch  Vitamins:   Continue with MVI       Weight Loss Options  Discussed Surgical and Medical Weight loss- Previously  Bariatric Surgery- Gastric Sleeve 9/11/2013 with 100 lb weight loss  Discussed Previous Weight Loss Attempts & PO vs Injectables for MWL  Topiramate- before bariatric surgery, did not like side effects with 'brain fog'  Phentermine/Phendimetrazine or other Amphetamines  EKG reviewed  Previous use before bariatric surgery with minimal change    Injectables- not covered without 3 weight loss attempts  12/16/25- Medical Weight Loss Plan  1/2 tablet of Phentermine (37.5 mg) per day x 1 month, the increase to full tablet  Create a better work/eating plan & Do not skip meals  Surgical Weight loss-  hx of sleeve     Option chosen today, 2/10/2025-   Discussed changes with [facility] [employer] coverage and out of pocket cost for injectables  Phentermine previously 1/2  tablet in AM for month (December 2024), increased to full tablet for 1 month (January 2025)  Injectables  Weight loss medications selection: Semaglutide (Wegovy)   Aware that will need prior authorization for medication, which can take some times  Aware medication not formulary at primary pharmacy and might have to change pharmacy if they do not carry it   Denies history or family history of Medullary Thyroid Cancer or Multiple endocrine neoplasia syndrome  Discussion of manifestations of elevated calcitonin levels  Will start dosage at 0.25 mg subcutaneous for 4 weeks (weekly injections)Can continue to increase every 4 weeks with max dose of 2.4 mg  Discussion of small, frequent meals to prevent low blood sugars  Nutrition packet- given on previous visit         Co morbidities:     1. Morbid obesity        2. BMI 40.0-44.9, adult        3. History of sleeve gastrectomy              : total time spent for visit: 34 minutes

## 2025-02-21 ENCOUNTER — OFFICE VISIT (OUTPATIENT)
Dept: FAMILY MEDICINE | Facility: CLINIC | Age: 50
End: 2025-02-21
Payer: COMMERCIAL

## 2025-02-21 VITALS
DIASTOLIC BLOOD PRESSURE: 82 MMHG | BODY MASS INDEX: 44.5 KG/M2 | HEART RATE: 74 BPM | HEIGHT: 63 IN | SYSTOLIC BLOOD PRESSURE: 124 MMHG | OXYGEN SATURATION: 99 % | TEMPERATURE: 101 F | WEIGHT: 251.13 LBS

## 2025-02-21 DIAGNOSIS — H66.005 RECURRENT ACUTE SUPPURATIVE OTITIS MEDIA WITHOUT SPONTANEOUS RUPTURE OF LEFT TYMPANIC MEMBRANE: ICD-10-CM

## 2025-02-21 DIAGNOSIS — J01.00 ACUTE NON-RECURRENT MAXILLARY SINUSITIS: Primary | ICD-10-CM

## 2025-02-21 DIAGNOSIS — J10.1 INFLUENZA A: ICD-10-CM

## 2025-02-21 LAB
CTP QC/QA: YES
CTP QC/QA: YES
POC MOLECULAR INFLUENZA A AGN: POSITIVE
POC MOLECULAR INFLUENZA B AGN: NEGATIVE
SARS-COV-2 RDRP RESP QL NAA+PROBE: NEGATIVE

## 2025-02-21 RX ORDER — AMOXICILLIN AND CLAVULANATE POTASSIUM 875; 125 MG/1; MG/1
1 TABLET, FILM COATED ORAL EVERY 12 HOURS
Qty: 10 TABLET | Refills: 0 | Status: SHIPPED | OUTPATIENT
Start: 2025-02-21 | End: 2025-02-26

## 2025-02-21 RX ORDER — OSELTAMIVIR PHOSPHATE 75 MG/1
75 CAPSULE ORAL 2 TIMES DAILY
Qty: 10 CAPSULE | Refills: 0 | Status: SHIPPED | OUTPATIENT
Start: 2025-02-21 | End: 2025-02-26

## 2025-02-21 RX ORDER — BENZONATATE 100 MG/1
100 CAPSULE ORAL 3 TIMES DAILY PRN
Qty: 20 CAPSULE | Refills: 0 | Status: SHIPPED | OUTPATIENT
Start: 2025-02-21 | End: 2025-03-03

## 2025-02-21 NOTE — PROGRESS NOTES
"[facility]   OFFICE VISIT NOTE    Patient Name: Kitty Jennings  YOB: 1975    PRESENTING HISTORY     History of Present Illness:  Ms. Kitty Jennings is a 49 y.o. female     History of Present Illness    CHIEF COMPLAINT:  Patient presents today with cough, headache, eye pain, and increased mucus production.    HISTORY OF PRESENT ILLNESS:  She reports onset of symptoms was yesterday after work with throat tingling. Patient developed increased mucus production requiring frequent throat clearing. By evening, symptoms progressed to include sensation of head floating and pressure around head and eyes. Currently, she has cough with tingling sensation in lower lungs and productive sputum that triggers gagging and occasional vomiting. She reports facial pain and severe occipital headache exacerbated by coughing, which she manages with cold compresses. She experienced wheezing yesterday that has since resolved. She endorses dizziness and fatigue with desire to lie down.    MEDICAL HISTORY:  She denies any underlying lung conditions such as asthma or COPD.     Review of Systems   Constitutional:  Positive for fatigue and fever. Negative for chills.   HENT:  Positive for sinus pressure/congestion. Negative for ear discharge and ear pain.    Respiratory:  Positive for cough. Negative for shortness of breath.    Gastrointestinal:  Negative for nausea and vomiting.          OBJECTIVE:   Vital Signs:  Vitals:    02/21/25 1021   BP: 124/82   Pulse: 74   Temp: (!) 100.5 °F (38.1 °C)   TempSrc: Oral   SpO2: 99%   Weight: 113.9 kg (251 lb 1.7 oz)   Height: 5' 3" (1.6 m)           Physical Exam  Constitutional:       General: She is not in acute distress.     Appearance: She is obese. She is not ill-appearing or toxic-appearing.   HENT:      Head: Normocephalic and atraumatic.      Right Ear: Tympanic membrane, ear canal and external ear normal.      Left Ear: Tympanic membrane is erythematous. "      Nose: No congestion or rhinorrhea.      Mouth/Throat:      Mouth: Mucous membranes are moist.      Pharynx: Uvula midline. No pharyngeal swelling or posterior oropharyngeal erythema.   Cardiovascular:      Rate and Rhythm: Normal rate and regular rhythm.   Pulmonary:      Effort: Pulmonary effort is normal. No tachypnea, bradypnea, accessory muscle usage, prolonged expiration or respiratory distress.      Breath sounds: Normal breath sounds. No stridor. No wheezing, rhonchi or rales.   Neurological:      General: No focal deficit present.      Mental Status: She is alert.   Psychiatric:         Mood and Affect: Mood normal.         Behavior: Behavior normal.         ASSESSMENT & PLAN:     Acute non-recurrent maxillary sinusitis  -     amoxicillin-clavulanate 875-125mg (AUGMENTIN) 875-125 mg per tablet; Take 1 tablet by mouth every 12 (twelve) hours. for 5 days  Dispense: 10 tablet; Refill: 0  - Suspected acute sinusitis based on symptoms and otoscopic exam as well   - Patient reports headache, eye pain, and significant mucus production, with symptoms starting 2 days ago.  - Noted pain across the face and in the eyes, with heaviness upon facial pressure.  - Prescribed antibiotics for the suspected sinus infection.  - Instructed the patient to monitor symptoms and report any worsening or lack of improvement.    Influenza A  -     oseltamivir (TAMIFLU) 75 MG capsule; Take 1 capsule (75 mg total) by mouth 2 (two) times daily. for 5 days  Dispense: 10 capsule; Refill: 0  -     benzonatate (TESSALON) 100 MG capsule; Take 1 capsule (100 mg total) by mouth 3 (three) times daily as needed for Cough.  Dispense: 20 capsule; Refill: 0  -     POCT COVID-19 Rapid Screening  -     POCT Influenza A/B Molecular  - Tested positive for flu A -- cough med and tamiflu prescribed. Also recommended to take Tylenol for fever management   - Auscultated the patient's lungs and found no wheezing  - Prescribed cough medication for symptom  relief.  - Advised the patient on proper use of cough medication and to maintain adequate hydration.      Recurrent acute suppurative otitis media without spontaneous rupture of left tympanic membrane  -     amoxicillin-clavulanate 875-125mg (AUGMENTIN) 875-125 mg per tablet; Take 1 tablet by mouth every 12 (twelve) hours. for 5 days  Dispense: 10 tablet; Refill: 0      Conchita Garay MD  Family Medicine  [facility]     This note was created using [employer] voice recognition software that occasionally misinterprets phrases or words

## 2025-02-26 ENCOUNTER — OFFICE VISIT (OUTPATIENT)
Dept: FAMILY MEDICINE | Facility: CLINIC | Age: 50
End: 2025-02-26
Payer: COMMERCIAL

## 2025-02-26 VITALS
HEART RATE: 64 BPM | WEIGHT: 244.13 LBS | HEIGHT: 63 IN | DIASTOLIC BLOOD PRESSURE: 78 MMHG | TEMPERATURE: 98 F | OXYGEN SATURATION: 100 % | BODY MASS INDEX: 43.26 KG/M2 | SYSTOLIC BLOOD PRESSURE: 112 MMHG

## 2025-02-26 DIAGNOSIS — K21.9 GASTROESOPHAGEAL REFLUX DISEASE, UNSPECIFIED WHETHER ESOPHAGITIS PRESENT: ICD-10-CM

## 2025-02-26 DIAGNOSIS — I10 HYPERTENSION, UNSPECIFIED TYPE: Primary | ICD-10-CM

## 2025-02-26 DIAGNOSIS — E55.9 VITAMIN D DEFICIENCY: ICD-10-CM

## 2025-02-26 PROCEDURE — G2211 COMPLEX E/M VISIT ADD ON: HCPCS | Mod: S$GLB,,, | Performed by: NURSE PRACTITIONER

## 2025-02-26 PROCEDURE — 3074F SYST BP LT 130 MM HG: CPT | Mod: CPTII,S$GLB,, | Performed by: NURSE PRACTITIONER

## 2025-02-26 PROCEDURE — 3008F BODY MASS INDEX DOCD: CPT | Mod: CPTII,S$GLB,, | Performed by: NURSE PRACTITIONER

## 2025-02-26 PROCEDURE — 1160F RVW MEDS BY RX/DR IN RCRD: CPT | Mod: CPTII,S$GLB,, | Performed by: NURSE PRACTITIONER

## 2025-02-26 PROCEDURE — 1159F MED LIST DOCD IN RCRD: CPT | Mod: CPTII,S$GLB,, | Performed by: NURSE PRACTITIONER

## 2025-02-26 PROCEDURE — 99214 OFFICE O/P EST MOD 30 MIN: CPT | Mod: S$GLB,,, | Performed by: NURSE PRACTITIONER

## 2025-02-26 PROCEDURE — 3078F DIAST BP <80 MM HG: CPT | Mod: CPTII,S$GLB,, | Performed by: NURSE PRACTITIONER

## 2025-02-26 PROCEDURE — 99999 PR PBB SHADOW E&M-EST. PATIENT-LVL III: CPT | Mod: PBBFAC,,, | Performed by: NURSE PRACTITIONER

## 2025-02-26 PROCEDURE — 4010F ACE/ARB THERAPY RXD/TAKEN: CPT | Mod: CPTII,S$GLB,, | Performed by: NURSE PRACTITIONER

## 2025-02-26 RX ORDER — PROPRANOLOL HYDROCHLORIDE 160 MG/1
160 CAPSULE, EXTENDED RELEASE ORAL DAILY
Qty: 90 CAPSULE | Refills: 1 | Status: SHIPPED | OUTPATIENT
Start: 2025-02-26

## 2025-02-26 RX ORDER — OLMESARTAN MEDOXOMIL 40 MG/1
40 TABLET ORAL DAILY
Qty: 90 TABLET | Refills: 1 | Status: SHIPPED | OUTPATIENT
Start: 2025-02-26

## 2025-02-26 RX ORDER — HYDROCHLOROTHIAZIDE 25 MG/1
25 TABLET ORAL DAILY
Qty: 90 TABLET | Refills: 1 | Status: SHIPPED | OUTPATIENT
Start: 2025-02-26

## 2025-02-26 RX ORDER — ESOMEPRAZOLE MAGNESIUM 40 MG/1
40 CAPSULE, DELAYED RELEASE ORAL
Qty: 90 CAPSULE | Refills: 1 | Status: SHIPPED | OUTPATIENT
Start: 2025-02-26

## 2025-02-26 NOTE — PROGRESS NOTES
SUBJECTIVE:      Patient ID: Kitty Jennings is a 49 y.o. female.    Chief Complaint: Hypertension    Kitty is here today for f/u on HTN, GERD and med refills today. She is taking her blood pressure medications as prescribed daily without side effects or complaints-blood pressure is below goal today.  She has been feeling well overall- starting Wegovy with bariatric/medical weight loss clinic.  She is continuing to follow-up care with Hematology with [facility]. Due for annual labs next month.     Hypertension  This is a chronic problem. The current episode started more than 1 year ago. The problem is unchanged. The problem is controlled. Associated symptoms include peripheral edema (intermittent). Pertinent negatives include no anxiety, blurred vision, chest pain, headaches, malaise/fatigue, neck pain, orthopnea, palpitations, shortness of breath or sweats. There are no associated agents to hypertension. Risk factors for coronary artery disease include obesity, sedentary lifestyle and family history. Past treatments include beta blockers, diuretics and angiotensin blockers. The current treatment provides significant improvement. Compliance problems include exercise and diet.  There is no history of angina, kidney disease, CAD/MI or CVA. There is no history of sleep apnea or a thyroid problem.   Edema  This is a recurrent problem. The current episode started more than 1 month ago. The problem occurs intermittently. The problem has been gradually worsening. Pertinent negatives include no abdominal pain, anorexia, arthralgias, change in bowel habit, chest pain, chills, congestion, coughing, diaphoresis, fatigue, fever, headaches, joint swelling, myalgias, nausea, neck pain, rash, sore throat, swollen glands, urinary symptoms, visual change, vomiting or weakness. The symptoms are aggravated by standing. Treatments tried: elevation, rest. The treatment provided moderate relief.       Family History   Problem Relation  Name Age of Onset    Hypertension Mother      Diabetes type II Mother      Diverticulosis Mother      Diverticulitis Mother      Hypertension Father      Heart disease Father      Diabetes type II Father      Hypertension Sister      Diverticulitis Sister      Hypertension Brother      Ovarian cancer Sister 1/2     No Known Problems Sister      No Known Problems Daughter      Colon cancer Neg Hx        Social History     Socioeconomic History    Marital status: Single   Tobacco Use    Smoking status: Never     Passive exposure: Past    Smokeless tobacco: Never   Substance and Sexual Activity    Alcohol use: No    Drug use: No    Sexual activity: Not Currently     Social Drivers of Health     Financial Resource Strain: Low Risk  (2/21/2025)    Overall Financial Resource Strain (CARDIA)     Difficulty of Paying Living Expenses: Not hard at all   Food Insecurity: No Food Insecurity (2/21/2025)    Hunger Vital Sign     Worried About Running Out of Food in the Last Year: Never true     Ran Out of Food in the Last Year: Never true   Transportation Needs: No Transportation Needs (2/21/2025)    PRAPARE - Transportation     Lack of Transportation (Medical): No     Lack of Transportation (Non-Medical): No   Physical Activity: Inactive (2/21/2025)    Exercise Vital Sign     Days of Exercise per Week: 0 days     Minutes of Exercise per Session: 0 min   Stress: Stress Concern Present (2/21/2025)    [employer] - Occupational Stress Questionnaire     Feeling of Stress : Very much   Housing Stability: Low Risk  (2/21/2025)    Housing Stability Vital Sign     Unable to Pay for Housing in the Last Year: No     Number of Times Moved in the Last Year: 0     Homeless in the Last Year: No     Current Medications[1]  Review of patient's allergies indicates:  No Known Allergies   Past Medical History:   Diagnosis Date    Anemia     Diverticulosis     GERD (gastroesophageal reflux disease)     Hives of unknown  "origin     Hx of migraines     Hypertension      Past Surgical History:   Procedure Laterality Date    ABDOMINOPLASTY  06/2023    BARIATRIC SURGERY  09/11/2013    Gastric sleeve    COLONOSCOPY N/A 06/04/2021    Procedure: COLONOSCOPY;  Surgeon: Ana Oscar MD;  Location: [facility];  Service: Endoscopy;  Laterality: N/A;    LIPOMA RESECTION      Back of neck. Benign.    TOTAL REDUCTION MAMMOPLASTY Bilateral 2018    UPPER GASTROINTESTINAL ENDOSCOPY  2016    Dr. Reid; hiatal hernia per pt report       Review of Systems   Constitutional:  Negative for activity change, chills, diaphoresis, fatigue, fever, malaise/fatigue and unexpected weight change.   HENT:  Negative for congestion, hearing loss, rhinorrhea, sore throat and trouble swallowing.    Eyes:  Negative for blurred vision, discharge and visual disturbance.   Respiratory:  Negative for cough, chest tightness, shortness of breath and wheezing.    Cardiovascular:  Negative for chest pain, palpitations, orthopnea and leg swelling (intermittent).   Gastrointestinal:  Negative for abdominal pain, anorexia, change in bowel habit, diarrhea, nausea and vomiting.   Endocrine: Negative for polydipsia and polyuria.   Genitourinary:  Negative for difficulty urinating, dysuria, hematuria and menstrual problem.   Musculoskeletal:  Negative for arthralgias, joint swelling, myalgias and neck pain.   Skin:  Negative for rash.   Neurological:  Negative for dizziness, weakness and headaches.   Psychiatric/Behavioral:  Negative for dysphoric mood. The patient is not nervous/anxious.       OBJECTIVE:      Vitals:    02/26/25 1444   BP: 112/78   BP Location: Left arm   Patient Position: Sitting   Pulse: 64   Temp: 98.1 °F (36.7 °C)   TempSrc: Oral   SpO2: 100%   Weight: 110.7 kg (244 lb 1.6 oz)   Height: 5' 3" (1.6 m)     Physical Exam  Vitals and nursing note reviewed.   Constitutional:       General: She is not in acute distress.     Appearance: Normal appearance. She is " well-developed. She is obese. She is not ill-appearing.      Comments: Morbid obesity    HENT:      Head: Normocephalic.      Right Ear: Tympanic membrane, ear canal and external ear normal.      Left Ear: Tympanic membrane, ear canal and external ear normal.      Nose: Nose normal.      Mouth/Throat:      Mouth: Mucous membranes are moist.   Eyes:      General: No scleral icterus.     Extraocular Movements: Extraocular movements intact.      Conjunctiva/sclera: Conjunctivae normal.      Pupils: Pupils are equal, round, and reactive to light.   Neck:      Thyroid: No thyroid mass or thyromegaly.      Trachea: Trachea normal.   Cardiovascular:      Rate and Rhythm: Normal rate and regular rhythm.      Heart sounds: Normal heart sounds. No murmur heard.  Pulmonary:      Effort: Pulmonary effort is normal. No respiratory distress.      Breath sounds: Normal breath sounds. No wheezing or rales.   Abdominal:      General: Bowel sounds are normal.      Palpations: Abdomen is soft.      Tenderness: There is no abdominal tenderness.   Musculoskeletal:         General: Normal range of motion.      Cervical back: Normal range of motion and neck supple.      Right lower leg: No edema.      Left lower leg: No edema.   Lymphadenopathy:      Cervical: No cervical adenopathy.   Skin:     General: Skin is warm and dry.      Coloration: Skin is not jaundiced or pale.   Neurological:      Mental Status: She is alert and oriented to person, place, and time.   Psychiatric:         Mood and Affect: Mood normal.         Behavior: Behavior normal.         Thought Content: Thought content normal.         Judgment: Judgment normal.        Assessment:       1. Hypertension, unspecified type    2. Gastroesophageal reflux disease, unspecified whether esophagitis present    3. Vitamin D deficiency    4. BMI 40.0-44.9, adult        Plan:       Hypertension, unspecified type  -     hydroCHLOROthiazide (HYDRODIURIL) 25 MG tablet; Take 1 tablet (25  mg total) by mouth once daily.  Dispense: 90 tablet; Refill: 1  -     olmesartan (BENICAR) 40 MG tablet; Take 1 tablet (40 mg total) by mouth once daily.  Dispense: 90 tablet; Refill: 1  -     propranoloL (INDERAL LA) 160 mg 24 hr capsule; Take 1 capsule (160 mg total) by mouth once daily.  Dispense: 90 capsule; Refill: 1  -     Comprehensive Metabolic Panel; Future; Expected date: 02/26/2025  -     Lipid Panel; Future; Expected date: 02/26/2025  -     TSH; Future; Expected date: 02/26/2025  -hypertension controlled, continue current medications; labs due in March; encouraged lifestyle changes, weight loss and exercise    Gastroesophageal reflux disease, unspecified whether esophagitis present  -     esomeprazole (NEXIUM) 40 MG capsule; Take 1 capsule (40 mg total) by mouth before breakfast.  Dispense: 90 capsule; Refill: 1  -GERD controlled on Nexium daily before breakfast, continue as prescribed; recommended vitamin daily    Vitamin D deficiency  -     Vitamin D; Future; Expected date: 02/26/2025    BMI 40.0-44.9, adult        Follow up in about 6 months (around 8/26/2025) for annual .      2/27/2025 HIMANSHU Villa, ROCAELP    This note was created using [employer] voice recognition software that occasionally misinterprets phrases or words.            [1]   Current Outpatient Medications   Medication Sig Dispense Refill    benzonatate (TESSALON) 100 MG capsule Take 1 capsule (100 mg total) by mouth 3 (three) times daily as needed for Cough. 20 capsule 0    cetirizine (ZYRTEC) 10 MG tablet Take 2 tablets (20 mg total) by mouth once daily. 180 tablet 3    esomeprazole (NEXIUM) 40 MG capsule Take 1 capsule (40 mg total) by mouth before breakfast. 90 capsule 1    hydroCHLOROthiazide (HYDRODIURIL) 25 MG tablet Take 1 tablet (25 mg total) by mouth once daily. 90 tablet 1    olmesartan (BENICAR) 40 MG tablet Take 1 tablet (40 mg total) by mouth once daily. 90 tablet 1    propranoloL (INDERAL LA) 160 mg 24 hr capsule Take  1 capsule (160 mg total) by mouth once daily. 90 capsule 1     No current facility-administered medications for this visit.

## 2025-03-13 ENCOUNTER — TELEPHONE (OUTPATIENT)
Dept: BARIATRICS | Facility: CLINIC | Age: 50
End: 2025-03-13
Payer: COMMERCIAL

## 2025-03-21 ENCOUNTER — LAB VISIT (OUTPATIENT)
Dept: LAB | Facility: HOSPITAL | Age: 50
End: 2025-03-21
Attending: INTERNAL MEDICINE
Payer: COMMERCIAL

## 2025-03-21 DIAGNOSIS — D50.8 IRON DEFICIENCY ANEMIA FOLLOWING BARIATRIC SURGERY: ICD-10-CM

## 2025-03-21 DIAGNOSIS — K95.89 IRON DEFICIENCY ANEMIA FOLLOWING BARIATRIC SURGERY: ICD-10-CM

## 2025-03-21 LAB
BASOPHILS # BLD AUTO: 0.05 K/UL (ref 0–0.2)
BASOPHILS NFR BLD: 0.9 % (ref 0–1.9)
DIFFERENTIAL METHOD BLD: NORMAL
EOSINOPHIL # BLD AUTO: 0.1 K/UL (ref 0–0.5)
EOSINOPHIL NFR BLD: 2.1 % (ref 0–8)
ERYTHROCYTE [DISTWIDTH] IN BLOOD BY AUTOMATED COUNT: 12.3 % (ref 11.5–14.5)
FERRITIN SERPL-MCNC: 252.9 NG/ML (ref 20–300)
HCT VFR BLD AUTO: 39 % (ref 37–48.5)
HGB BLD-MCNC: 12.6 G/DL (ref 12–16)
IMM GRANULOCYTES # BLD AUTO: 0.01 K/UL (ref 0–0.04)
IMM GRANULOCYTES NFR BLD AUTO: 0.2 % (ref 0–0.5)
LYMPHOCYTES # BLD AUTO: 2.7 K/UL (ref 1–4.8)
LYMPHOCYTES NFR BLD: 45.6 % (ref 18–48)
MCH RBC QN AUTO: 29.3 PG (ref 27–31)
MCHC RBC AUTO-ENTMCNC: 32.3 G/DL (ref 32–36)
MCV RBC AUTO: 91 FL (ref 82–98)
MONOCYTES # BLD AUTO: 0.6 K/UL (ref 0.3–1)
MONOCYTES NFR BLD: 9.8 % (ref 4–15)
NEUTROPHILS # BLD AUTO: 2.4 K/UL (ref 1.8–7.7)
NEUTROPHILS NFR BLD: 41.4 % (ref 38–73)
NRBC BLD-RTO: 0 /100 WBC
PLATELET # BLD AUTO: 206 K/UL (ref 150–450)
PMV BLD AUTO: 11.3 FL (ref 9.2–12.9)
RBC # BLD AUTO: 4.3 M/UL (ref 4–5.4)
WBC # BLD AUTO: 5.81 K/UL (ref 3.9–12.7)

## 2025-03-21 PROCEDURE — 85025 COMPLETE CBC W/AUTO DIFF WBC: CPT | Performed by: INTERNAL MEDICINE

## 2025-03-21 PROCEDURE — 36415 COLL VENOUS BLD VENIPUNCTURE: CPT | Performed by: INTERNAL MEDICINE

## 2025-03-21 PROCEDURE — 82728 ASSAY OF FERRITIN: CPT | Performed by: INTERNAL MEDICINE

## 2025-03-25 ENCOUNTER — PATIENT MESSAGE (OUTPATIENT)
Dept: BARIATRICS | Facility: CLINIC | Age: 50
End: 2025-03-25
Payer: COMMERCIAL

## 2025-03-25 DIAGNOSIS — E66.01 MORBID OBESITY: Primary | ICD-10-CM

## 2025-03-26 RX ORDER — SEMAGLUTIDE 0.5 MG/.5ML
0.5 INJECTION, SOLUTION SUBCUTANEOUS
Qty: 2 ML | Refills: 1 | Status: SHIPPED | OUTPATIENT
Start: 2025-03-26

## 2025-03-31 ENCOUNTER — TELEPHONE (OUTPATIENT)
Facility: CLINIC | Age: 50
End: 2025-03-31
Payer: COMMERCIAL

## 2025-03-31 NOTE — TELEPHONE ENCOUNTER
LVM to confirm appointment scheduled for 4/8 at 2:00 with DR. Rolle and remind pt to complete labs prior to visit.

## 2025-04-08 ENCOUNTER — OFFICE VISIT (OUTPATIENT)
Facility: CLINIC | Age: 50
End: 2025-04-08
Payer: COMMERCIAL

## 2025-04-08 ENCOUNTER — OFFICE VISIT (OUTPATIENT)
Dept: BARIATRICS | Facility: CLINIC | Age: 50
End: 2025-04-08
Payer: COMMERCIAL

## 2025-04-08 VITALS
HEIGHT: 63 IN | BODY MASS INDEX: 43.79 KG/M2 | TEMPERATURE: 98 F | HEART RATE: 85 BPM | WEIGHT: 247.13 LBS | RESPIRATION RATE: 18 BRPM | OXYGEN SATURATION: 100 %

## 2025-04-08 VITALS
BODY MASS INDEX: 42.99 KG/M2 | WEIGHT: 242.63 LBS | DIASTOLIC BLOOD PRESSURE: 73 MMHG | RESPIRATION RATE: 16 BRPM | HEIGHT: 63 IN | SYSTOLIC BLOOD PRESSURE: 112 MMHG | TEMPERATURE: 98 F | HEART RATE: 85 BPM

## 2025-04-08 DIAGNOSIS — D51.8 DIETARY VITAMIN B12 DEFICIENCY ANEMIA: ICD-10-CM

## 2025-04-08 DIAGNOSIS — Z79.899 MEDICATION DOSE INCREASED: ICD-10-CM

## 2025-04-08 DIAGNOSIS — D50.8 IRON DEFICIENCY ANEMIA FOLLOWING BARIATRIC SURGERY: Primary | ICD-10-CM

## 2025-04-08 DIAGNOSIS — K95.89 IRON DEFICIENCY ANEMIA FOLLOWING BARIATRIC SURGERY: Primary | ICD-10-CM

## 2025-04-08 DIAGNOSIS — E66.01 MORBID OBESITY: Primary | ICD-10-CM

## 2025-04-08 PROCEDURE — 4010F ACE/ARB THERAPY RXD/TAKEN: CPT | Mod: CPTII,S$GLB,, | Performed by: INTERNAL MEDICINE

## 2025-04-08 PROCEDURE — 99215 OFFICE O/P EST HI 40 MIN: CPT | Mod: S$GLB,,, | Performed by: INTERNAL MEDICINE

## 2025-04-08 PROCEDURE — 1159F MED LIST DOCD IN RCRD: CPT | Mod: CPTII,S$GLB,, | Performed by: NURSE PRACTITIONER

## 2025-04-08 PROCEDURE — 3008F BODY MASS INDEX DOCD: CPT | Mod: CPTII,S$GLB,, | Performed by: NURSE PRACTITIONER

## 2025-04-08 PROCEDURE — 99213 OFFICE O/P EST LOW 20 MIN: CPT | Mod: S$GLB,,, | Performed by: NURSE PRACTITIONER

## 2025-04-08 PROCEDURE — 4010F ACE/ARB THERAPY RXD/TAKEN: CPT | Mod: CPTII,S$GLB,, | Performed by: NURSE PRACTITIONER

## 2025-04-08 PROCEDURE — 3074F SYST BP LT 130 MM HG: CPT | Mod: CPTII,S$GLB,, | Performed by: NURSE PRACTITIONER

## 2025-04-08 PROCEDURE — 1160F RVW MEDS BY RX/DR IN RCRD: CPT | Mod: CPTII,S$GLB,, | Performed by: NURSE PRACTITIONER

## 2025-04-08 PROCEDURE — 3008F BODY MASS INDEX DOCD: CPT | Mod: CPTII,S$GLB,, | Performed by: INTERNAL MEDICINE

## 2025-04-08 PROCEDURE — 99999 PR PBB SHADOW E&M-EST. PATIENT-LVL III: CPT | Mod: PBBFAC,,, | Performed by: INTERNAL MEDICINE

## 2025-04-08 PROCEDURE — 99999 PR PBB SHADOW E&M-EST. PATIENT-LVL III: CPT | Mod: PBBFAC,,, | Performed by: NURSE PRACTITIONER

## 2025-04-08 PROCEDURE — 3078F DIAST BP <80 MM HG: CPT | Mod: CPTII,S$GLB,, | Performed by: NURSE PRACTITIONER

## 2025-04-08 PROCEDURE — G2211 COMPLEX E/M VISIT ADD ON: HCPCS | Mod: S$GLB,,, | Performed by: INTERNAL MEDICINE

## 2025-04-08 RX ORDER — SEMAGLUTIDE 1 MG/.5ML
1 INJECTION, SOLUTION SUBCUTANEOUS
Qty: 2 ML | Refills: 2 | Status: SHIPPED | OUTPATIENT
Start: 2025-04-08

## 2025-04-08 NOTE — PROGRESS NOTES
"[facility] [address] Hematology Oncology In Office Subsequent Encounter Note    25    Subjective:      Patient ID:   Kitty Jennings  49 y.o. female  1975  Cecilia White NP      Chief Complaint:   Anemia eval.    HPI:  49 y.o. female with hx of Fe deficiency anemia, treated with oral Fe in the past.  Admits to fatigue decreasing.  Energy 7/10.  More time out of bed after a days work.  Intermittent hives since age 19.  Zertec and benadryl prn.  Dr. Chavez feels hives may be stress induced.  She does not get hives on the weekend, away from her job duties.    S/P gastric sleave surgery.  S/P Injectafer.  Hgb 13.2.  B 6 resolved peripheral neuropathy.  B 6 4 to 53 and now at 2.8.  Resume B 6 daily.  B 12 is 297.  She has not been taking her B 12 or B 6 supplements.  Resume  B 12 and B6 po now.    Now 23, Hgb 11.8, Ferritin 115 to 80.  B 12 463 to 330 to 254, she will resume her subl B 12.  Vit D is 10-12.  Begin Vit D 2-3,000 units daily.      Stronger.  Hive sx have improved.  She saw Dr. Chavez.  Measures taken to control hives sx.  Hives sx controlled with Zertec 20 mg daily.    She had 360 Lipo procedure and "tummy tuck" on 23.  Hgb now at 11.  Downward trend could be due to blood loss with surgery and last light menses.  Should  recover with compensatory RBC production in bone marrow.     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.  Smoke no.  ETOH no. Allergy no.    Hx HTN, GERD, migraine HA.  Menses NL flow.  She has been sleeping poorly, under stress, caring for her mother, in failing health.    Gastric sleave surgery 13.  mammogram on 21 [facility]. Neg for Cancer.    Colonoscopy 2022 diverticulosis.    M0  Menses onset 11  1st live child at 22    No family hx of anemia.  Dad HTN, DM  Mom A & W  Sister HTN x's 2    Her daughter had covid, pt did not.  Pt has gotten 2/2 vaccines, booster, but not the flu shot.    She has history of iron deficiency and B12 deficiency " anemia.  She received and Injectafer infusion x2 in April and May of 2024.  Hemoglobin has improved from 11-12.7 and her ferritin level has improved from 8 to 293, upper limits of normal.  She does take B12 sublingually but is noncompliant in taking it regularly.  I have asked her to try and take her B12 sublingual daily.  She also has a history of vitamin-D deficiency and is on vitamin-D supplementation.    She follows up with myself on April 8, 2025.  Hemoglobin has improved from 9.9 up to 12.7, within normal limits.  Her ferritin level after Injectafer infusion has gone from 8 up to 293.  She is on B12 sublingually but she takes it intermittently.  B12 has gone from 254 up to 607, well within the normal range.  She will continue on her B12 sublingually daily, stressed compliance with this medication.    She had a tummy tuck procedure June 19, 2023.  She follows up with myself in 6 months with CBC, ferritin, B12.    She asked if I could write a letter to [employer] getting her a portable heater for under her desk to help keep her warm.    ROS:   GEN: normal without any fever, night sweats or weight loss  HEENT: normal with no HA's, sore throat, stiff neck, changes in vision  CV: normal with no CP, SOB, PND, KAM or orthopnea  PULM: normal with no SOB, cough, hemoptysis, sputum or pleuritic pain  GI: normal with no abdominal pain, nausea, vomiting, constipation, diarrhea, melanotic stools, BRBPR, or hematemesis  : normal with no hematuria, dysuria  BREAST: normal with no mass, discharge, pain  SKIN: see HPI      Review of patient's allergies indicates:  No Known Allergies        Current Outpatient Medications:     cetirizine (ZYRTEC) 10 MG tablet, Take 2 tablets (20 mg total) by mouth once daily., Disp: 180 tablet, Rfl: 3    esomeprazole (NEXIUM) 40 MG capsule, Take 1 capsule (40 mg total) by mouth before breakfast., Disp: 90 capsule, Rfl: 1    hydroCHLOROthiazide (HYDRODIURIL) 25 MG tablet, Take 1 tablet (25 mg total)  "by mouth once daily., Disp: 90 tablet, Rfl: 1    olmesartan (BENICAR) 40 MG tablet, Take 1 tablet (40 mg total) by mouth once daily., Disp: 90 tablet, Rfl: 1    propranoloL (INDERAL LA) 160 mg 24 hr capsule, Take 1 capsule (160 mg total) by mouth once daily., Disp: 90 capsule, Rfl: 1    semaglutide, weight loss, (WEGOVY) 0.5 mg/0.5 mL PnIj, Inject 0.5 mg into the skin every 7 days., Disp: 2 mL, Rfl: 1          Objective:   Vitals:  Blood pressure (!) (P) 117/58, pulse 85, temperature 98.4 °F (36.9 °C), temperature source Temporal, resp. rate 18, height 5' 3" (1.6 m), weight 112.1 kg (247 lb 1.6 oz), SpO2 100%.        Ferritin stable at 92. After Injectafer infusions in past.  She takes B 12 subl daily, level at 607.  Vitamin D at 22, on 2000 units daily.  Hgb 11.Now ferritin is 8.  Assessment:   (1) 49 y.o. female with diagnosis of Fe deficiency 2nd decreased absorption 2nd bariatric surgery.  Gave  Injectafer  Weekly x's 2 to replenish Fe stores.  Estimated 1-2% risk of reaction, she tolerated it well.  [facility].  Anemia resolved to NL, with Fe replenishment.    (2)Referred to Dr. Lori Chavez of allergy/ immunology to try clarify hives and Rx. Hives controlled with Zertec 20 mg daily.    On B 6 po, PN sx in feet resolved.    Given Injectafer infusion x's 2 at . 4/2024.  Continue B 12 subl daily.  Stressed compliance in taking B 12 subl daily.   Vitamin D 4000 units daily  Continue multivitamin daily.    Hgb 11 to 12.7.  Normalized, no longer anemic.  Ferritin is gone from 8 up to 293, high normal.  B 12 up to 607.  RTC 6 months with CBC, ferritin, B 12.                   "

## 2025-04-08 NOTE — LETTER
April 8, 2025        Cecilia White, FNP  [address]  [address]  [place] [address]             [facility] [facility] - Hematology Oncology  [address]  [address]  [place] [address]  Phone: [phone]  Fax: [phone]   Patient: Kitty Jennings   MR Number: [id number]   YOB: 1975   Date of Visit: 4/8/2025       Dear Dr. White:    Thank you for referring Kitty Jennings to me for evaluation. Below are the relevant portions of my assessment and plan of care.            If you have questions, please do not hesitate to call me. I look forward to following Kitty along with you.    Sincerely,      MARCIA Rolle MD           CC  No Recipients

## 2025-04-08 NOTE — PROGRESS NOTES
Medically Supervised Weight Loss Documentation    Date of Visit: 04/08/2025    Patient: Kitty Jennings    Beginning Weight: 249    Last Weight: 247    Current Weight: 242 Current BMI: Body mass index is 42.97 kg/m².  Weight Change: -2    GOAL: 140 lbs    Vitals:   Vitals:    04/08/25 1444   BP: 112/73   Pulse: 85   Resp: 16   Temp: 98.2 °F (36.8 °C)       Comorbidities:   Past Medical History:   Diagnosis Date    Anemia     Diverticulosis     GERD (gastroesophageal reflux disease)     Hives of unknown origin     Hx of migraines     Hypertension        Medications:  Current Outpatient Medications on File Prior to Visit   Medication Sig Dispense Refill    cetirizine (ZYRTEC) 10 MG tablet Take 2 tablets (20 mg total) by mouth once daily. 180 tablet 3    esomeprazole (NEXIUM) 40 MG capsule Take 1 capsule (40 mg total) by mouth before breakfast. 90 capsule 1    hydroCHLOROthiazide (HYDRODIURIL) 25 MG tablet Take 1 tablet (25 mg total) by mouth once daily. 90 tablet 1    olmesartan (BENICAR) 40 MG tablet Take 1 tablet (40 mg total) by mouth once daily. 90 tablet 1    propranoloL (INDERAL LA) 160 mg 24 hr capsule Take 1 capsule (160 mg total) by mouth once daily. 90 capsule 1    semaglutide, weight loss, (WEGOVY) 0.5 mg/0.5 mL PnIj Inject 0.5 mg into the skin every 7 days. 2 mL 1     No current facility-administered medications on file prior to visit.         Tanita Scale Body Composition:  Fat Percent:  50.5 %  Fat Mass:  122.6 lb  FFM:  120 lb  TBW: 87.8 lb  TBW %:  36.2 %  BMR: 1725 kcal    Diet Education Discussed: Recommend high protein, low carb meals- mainly meats and vegetables.    Busy at work and just skip meals/water  Breakfast:  premier protein shake (when out-skips meal)- no appetite in AM  Lunch:  pack skip typically- lean cuisine, pre-made salad with caesar dressing  Dinner:  Fried Catfish from store pre-made & broccoli cheese/rice,   Rarely eat a plate of food  Water: 2 bottles (16 oz)-   PJ coffee-  protein blended  Unsweet Green Tea with honey lemon with no ice  Drink with straw  Not eating/drinking together- hours before drink      MVI:   B12  Vit D  Biotin  MVI without iron    Exercise: Recommend cardiovascular exercise, get HR over 100 for 20 minutes 3 times per week  none    Behavorial/Counseling Plan for patient:  Falls reviewed with patient  Continue prescribed medications  Tanita scale results reviewed with patient  Increased lb fat  Decreased lb muscle  Increased lb water  Discussed muscle vs fat   Do not exceed 1600 calories/day  Diet: continue protocol  Focus on small, frequent meals with eating high protein, low carb  Clear, protein powder added to water, typically 20 gm protein- brands like Isopure Fusion, Oath, Seeq  Unflavored protein, Isopure, can add 25g to foods   Low carb, high protein foods  Do not skip meals- replace with protein shake  Creat a better work/eating plan  Increase Protein to 60 gm/day at minimum  Transition to Low calorie breads- nenita Yadav  Track foods with MEL- 1/2 daily intake should be protein  Exercise:   Increase activity as much as possible- walk for part of lunch  Vitamins:   Continue with MVI       Weight Loss Options  Discussed Surgical and Medical Weight loss- Previously  Bariatric Surgery- Gastric Sleeve 9/11/2013 with 100 lb weight loss  Discussed Previous Weight Loss Attempts & PO vs Injectables for MWL  Topiramate- before bariatric surgery, did not like side effects with 'brain fog'  Phentermine/Phendimetrazine or other Amphetamines  EKG reviewed  Previous use before bariatric surgery with minimal change    Injectables- not covered without 3 weight loss attempts  12/16/25- Medical Weight Loss Plan  1/2 tablet of Phentermine (37.5 mg) per day x 1 month, the increase to full tablet  Create a better work/eating plan & Do not skip meals  Surgical Weight loss-  hx of sleeve      2/10/2025-   Discussed changes with [facility] Federal coverage and out of pocket cost for  injectables  Phentermine previously 1/2 tablet in AM for month (December 2024), increased to full tablet for 1 month (January 2025)  Injectables  Weight loss medications selection: Semaglutide (Wegovy)   Aware that will need prior authorization for medication, which can take some times  Aware medication not formulary at primary pharmacy and might have to change pharmacy if they do not carry it   Denies history or family history of Medullary Thyroid Cancer or Multiple endocrine neoplasia syndrome  Discussion of manifestations of elevated calcitonin levels  Will start dosage at 0.25 mg subcutaneous for 4 weeks (weekly injections)Can continue to increase every 4 weeks with max dose of 2.4 mg  Discussion of small, frequent meals to prevent low blood sugars  Nutrition packet- given on previous visit     4/8/25  Increased activity at work sicne February with going back into work with meeting/going back in office  Decreased appetite with Wegovy- injections on Saturday  0.5 mg now, completed 2 injections    Co morbidities:     1. Morbid obesity        2. BMI 40.0-44.9, adult                : total time spent for visit: 34 minutes

## 2025-04-10 ENCOUNTER — TELEPHONE (OUTPATIENT)
Facility: CLINIC | Age: 50
End: 2025-04-10
Payer: COMMERCIAL

## 2025-04-10 NOTE — TELEPHONE ENCOUNTER
"Spoke to Patient. Inquired about the information she needed for her letter to [employer] (employer) regarding the heater she needs under her desk.  Per her request, this will be uploaded to her "MyChart" once it is signed.  "

## 2025-04-14 ENCOUNTER — PATIENT MESSAGE (OUTPATIENT)
Facility: CLINIC | Age: 50
End: 2025-04-14
Payer: COMMERCIAL

## 2025-04-17 ENCOUNTER — PATIENT MESSAGE (OUTPATIENT)
Dept: BARIATRICS | Facility: CLINIC | Age: 50
End: 2025-04-17
Payer: COMMERCIAL

## 2025-04-23 DIAGNOSIS — L50.8 CHRONIC URTICARIA: ICD-10-CM

## 2025-04-24 RX ORDER — CETIRIZINE HYDROCHLORIDE 10 MG/1
20 TABLET ORAL
Qty: 180 TABLET | Refills: 3 | OUTPATIENT
Start: 2025-04-24

## 2025-04-28 ENCOUNTER — OFFICE VISIT (OUTPATIENT)
Dept: FAMILY MEDICINE | Facility: CLINIC | Age: 50
End: 2025-04-28
Payer: COMMERCIAL

## 2025-04-28 VITALS
WEIGHT: 245.56 LBS | OXYGEN SATURATION: 99 % | HEART RATE: 68 BPM | TEMPERATURE: 98 F | BODY MASS INDEX: 43.51 KG/M2 | DIASTOLIC BLOOD PRESSURE: 84 MMHG | SYSTOLIC BLOOD PRESSURE: 110 MMHG | HEIGHT: 63 IN

## 2025-04-28 DIAGNOSIS — L50.8 CHRONIC URTICARIA: ICD-10-CM

## 2025-04-28 DIAGNOSIS — J32.9 SINUSITIS, UNSPECIFIED CHRONICITY, UNSPECIFIED LOCATION: ICD-10-CM

## 2025-04-28 DIAGNOSIS — J06.9 UPPER RESPIRATORY TRACT INFECTION, UNSPECIFIED TYPE: Primary | ICD-10-CM

## 2025-04-28 PROCEDURE — 4010F ACE/ARB THERAPY RXD/TAKEN: CPT | Mod: CPTII,S$GLB,,

## 2025-04-28 PROCEDURE — 99213 OFFICE O/P EST LOW 20 MIN: CPT | Mod: S$GLB,,,

## 2025-04-28 PROCEDURE — 3079F DIAST BP 80-89 MM HG: CPT | Mod: CPTII,S$GLB,,

## 2025-04-28 PROCEDURE — 99999 PR PBB SHADOW E&M-EST. PATIENT-LVL III: CPT | Mod: PBBFAC,,,

## 2025-04-28 PROCEDURE — 1159F MED LIST DOCD IN RCRD: CPT | Mod: CPTII,S$GLB,,

## 2025-04-28 PROCEDURE — 3074F SYST BP LT 130 MM HG: CPT | Mod: CPTII,S$GLB,,

## 2025-04-28 PROCEDURE — 1160F RVW MEDS BY RX/DR IN RCRD: CPT | Mod: CPTII,S$GLB,,

## 2025-04-28 PROCEDURE — 3008F BODY MASS INDEX DOCD: CPT | Mod: CPTII,S$GLB,,

## 2025-04-28 RX ORDER — FLUTICASONE PROPIONATE 50 MCG
1 SPRAY, SUSPENSION (ML) NASAL 2 TIMES DAILY
Qty: 9.9 ML | Refills: 2 | Status: SHIPPED | OUTPATIENT
Start: 2025-04-28

## 2025-04-28 RX ORDER — CETIRIZINE HYDROCHLORIDE 10 MG/1
20 TABLET ORAL DAILY
Qty: 180 TABLET | Refills: 3 | Status: SHIPPED | OUTPATIENT
Start: 2025-04-28 | End: 2026-04-23

## 2025-04-28 NOTE — PROGRESS NOTES
[facility]     Subjective:       Patient ID:  [id number]     Chief Complaint: Nasal Congestion    Kitty Jennings is a 49 y.o. female with a past medical history significant for   HTN, GUILLAUME, and diverticulosis who presents to the clinic for upper respiratory symptoms.     Patient presents to the clinic with complaints of sneezing and nasal congestion x3 days.  She takes Zyrtec daily for chronic urticaria.  She states that on Friday she started having several episodes of sneezing which progressed into bilateral nasal pressure and congestion.  She does complain of pressure behind the eyes and some ear fullness.  She has no other complaints today and denies any fever, chills, sore throat, ear pain, chest pain, or shortness of breath.    Past Medical History:   Diagnosis Date    Anemia     Diverticulosis     GERD (gastroesophageal reflux disease)     Hives of unknown origin     Hx of migraines     Hypertension       Review of patient's allergies indicates:  No Known Allergies    Lab Results   Component Value Date    WBC 5.81 03/21/2025    HGB 12.6 03/21/2025    HCT 39.0 03/21/2025     03/21/2025    CHOL 173 03/15/2024    TRIG 140 03/15/2024    HDL 50 03/15/2024    ALT 16 08/16/2024    AST 15 08/16/2024     08/16/2024    K 3.6 08/16/2024     08/16/2024    CREATININE 0.8 08/16/2024    BUN 17 08/16/2024    CO2 24 08/16/2024    TSH 1.029 03/15/2024    HGBA1C 5.5 08/16/2024       Review of Systems   Constitutional:  Negative for chills and fever.   HENT:  Positive for congestion, sinus pressure and sneezing. Negative for ear pain and sinus pain.    Respiratory:  Negative for cough and shortness of breath.    Cardiovascular:  Negative for chest pain.         Objective:      Physical Exam  Constitutional:       General: She is not in acute distress.     Appearance: Normal appearance. She is not toxic-appearing.   HENT:      Head: Normocephalic and atraumatic.      Right Ear: Tympanic  membrane is not erythematous, retracted or bulging.      Left Ear: Tympanic membrane is not erythematous, retracted or bulging.      Ears:      Comments: Mild fluid behind left TM.      Nose: Nose normal.      Right Sinus: No maxillary sinus tenderness or frontal sinus tenderness.      Left Sinus: No maxillary sinus tenderness or frontal sinus tenderness.      Mouth/Throat:      Pharynx: No posterior oropharyngeal erythema or postnasal drip.   Cardiovascular:      Rate and Rhythm: Normal rate and regular rhythm.      Pulses: Normal pulses.      Heart sounds: No murmur heard.     No friction rub.   Pulmonary:      Effort: Pulmonary effort is normal. No respiratory distress.      Breath sounds: Normal breath sounds. No wheezing.   Musculoskeletal:      Cervical back: Normal range of motion.      Right lower leg: No edema.      Left lower leg: No edema.   Skin:     General: Skin is warm and dry.   Neurological:      General: No focal deficit present.      Mental Status: She is alert and oriented to person, place, and time.   Psychiatric:         Mood and Affect: Mood normal.           Assessment:       1. Upper respiratory tract infection, unspecified type    2. Sinusitis, unspecified chronicity, unspecified location    3. Chronic urticaria          Plan:       Kitty was seen today for nasal congestion.    Diagnoses and all orders for this visit:    Upper respiratory tract infection, unspecified type  Comments:  POCT COVID and flu negative.   -    For sinus pressure or nasal congestion, recommend Sudafed (Pseudoephedrine) from behind the pharmacy counter    -    Afrin nasal spray as needed for nasal congestion (max of three days) followed by sinus rinse (Javon med or Neti pot) with distilled water   -    Hot tea with honey or throat lozenges as needed for sore throat/ cough   -    Tylenol 500 mg or Ibuprofen 200 mg 2 tabs every 6 hours as needed for fever, headache, body aches, throat pain, etc    -    Hydrate well  to thin mucus  Orders:  -     POCT COVID-19 Rapid Screening  -     POCT Influenza A/B Molecular  -     fluticasone propionate (FLONASE) 50 mcg/actuation nasal spray; 1 spray (50 mcg total) by Each Nostril route 2 (two) times daily. Point up and slightly outward toward ear when spraying to avoid irritating nasal septum.    Sinusitis, unspecified chronicity, unspecified location  Comments:  Likely viral vs allergy. Conservative treatment discussed with patient. Advised agaisnt antibiotic. Patient to contact clinic if symptoms worsen or persist.  Orders:  -     fluticasone propionate (FLONASE) 50 mcg/actuation nasal spray; 1 spray (50 mcg total) by Each Nostril route 2 (two) times daily. Point up and slightly outward toward ear when spraying to avoid irritating nasal septum.    Chronic urticaria  -     cetirizine (ZYRTEC) 10 MG tablet; Take 2 tablets (20 mg total) by mouth once daily.        Follow up for if symptoms are not improved.    Cecilia Burgess PA-C  Family Medicine Physician Assistant     Future Appointments       Date Provider Specialty Appt Notes    5/7/2025 Jm Bhandari MD Allergy Continuing to maintain control of hives.    7/15/2025 Bia Henry NP Bariatrics 3 mo f/u    8/27/2025 Cecilia White, [facility] Family Medicine Annual    10/13/2025 MARCIA Rolle MD Hematology and Oncology *6 MONTH F/U W/LABS @              All of your core healthy metrics are met.     I spent a total of 20 minutes on the day of the visit.This includes face to face time and non-face to face time preparing to see the patient (eg, review of tests), obtaining and/or reviewing separately obtained history, documenting clinical information in the electronic or other health record, independently interpreting results and communicating results to the patient/family/caregiver, or care coordinator.

## 2025-05-16 NOTE — PROGRESS NOTES
ALLERGY & IMMUNOLOGY CLINIC -  Established Patient     HISTORY OF PRESENT ILLNESS     Patient ID: Kitty Jennings is a 49 y.o. female    CC: follow up visit    HPI: Kitty Jennings is a 49 y.o. female presents for follow up.     Office Visit 05/19/2025  Chronic Hives: Doing well since visit last years. Hives have been well controlled taking cetirizine 20mg daily. Not tried intentional weaning, but did start to run out of cetirizine several weeks ago and developed worsening sinus pressure, congestion and sneezing. Was restarted by PCP on cetirizine 20mg daily and felt like since that time symptoms have improved again. Has been under stress at work with [employer], but not noticed an exacerbation of symptoms.     03/15/2024  Hives: Previously followed with Dr. Chavez and noted that stress exacerbates symptoms. Notices that during these episodes will experience upper and lower extremity hives, lip and eye swelling. Previously poorly controlled with diphenhydramine and cetirizine 10mg daily. Symptoms now well controlled with cetirizine 20mg daily. When she stopped earlier this year, experienced progressively worsening hives after 2-3 days. Never required omalizumab. Denies worsening with Aspirin, NSAIDs, alcohol intake. Previous allergy testing was negative.        REVIEW OF SYSTEMS     CONST: no F/C/NS, no unintentional weight changes  Balance of review of systems negative except as mentioned above     MEDICAL HISTORY     MedHx: active problems reviewed  SurgHx:   Past Surgical History:   Procedure Laterality Date    ABDOMINOPLASTY  06/2023    BARIATRIC SURGERY  09/11/2013    Gastric sleeve    COLONOSCOPY N/A 06/04/2021    Procedure: COLONOSCOPY;  Surgeon: Ana Oscar MD;  Location: [facility];  Service: Endoscopy;  Laterality: N/A;    LIPOMA RESECTION      Back of neck. Benign.    TOTAL REDUCTION MAMMOPLASTY Bilateral 2018    UPPER GASTROINTESTINAL ENDOSCOPY  2016    Dr. Reid; hiatal  "hernia per pt report     Allergies: see below  Medications: MAR reviewed    No pertinent allergy changes in medical history since last visit     PHYSICAL EXAM     VS: Ht 5' 3" (1.6 m)   Wt 111.4 kg (245 lb 9.5 oz)   BMI 43.50 kg/m²   GENERAL: awake, alert, cooperative with exam  EYES: PERRL, EOMI, no conjunctival injection, no discharge, no infraorbital shiners  EARS: external auditory canals normal B/L, TM normal B/l  LUNGS: CTAB, no w/r/c, no increased WOB  HEART: Normal Rate and regular rhythm, normal S1/S2, no m/g/r  EXTREMITIES: +2 distal pulses, no c/c/e  DERM: no rashes, no skin breaks  NEURO: normal gait, no facial asymmetry     ASSESSMENT/PLAN     Kitty Jennings is a 49 y.o. female with       1. Chronic urticaria      Well controlled at this time  Continue cetirizine 20mg daily, can increase to BID use as needed for further break outs  Additionally could add Famotidine if symptoms refractory to cetirizine 20mg BID  Provided refills as requested of cetirizine    Follow up: 1 year      Jm Bhandari MD    I spent a total of 30 minutes on the day of the visit. This includes face to face time and non-face to face time preparing to see the patient (eg, review of tests), obtaining and/or reviewing separately obtained history, documenting clinical information in the electronic or other health record, independently interpreting results and communicating results to the patient/family/caregiver, or care coordinator.        "

## 2025-05-19 ENCOUNTER — OFFICE VISIT (OUTPATIENT)
Dept: ALLERGY | Facility: CLINIC | Age: 50
End: 2025-05-19
Payer: COMMERCIAL

## 2025-05-19 VITALS — HEIGHT: 63 IN | WEIGHT: 245.56 LBS | BODY MASS INDEX: 43.51 KG/M2

## 2025-05-19 DIAGNOSIS — L50.8 CHRONIC URTICARIA: Primary | ICD-10-CM

## 2025-05-19 PROCEDURE — 99999 PR PBB SHADOW E&M-EST. PATIENT-LVL III: CPT | Mod: PBBFAC,,, | Performed by: STUDENT IN AN ORGANIZED HEALTH CARE EDUCATION/TRAINING PROGRAM

## 2025-05-19 PROCEDURE — G2211 COMPLEX E/M VISIT ADD ON: HCPCS | Mod: S$GLB,,, | Performed by: STUDENT IN AN ORGANIZED HEALTH CARE EDUCATION/TRAINING PROGRAM

## 2025-05-19 PROCEDURE — 3008F BODY MASS INDEX DOCD: CPT | Mod: CPTII,S$GLB,, | Performed by: STUDENT IN AN ORGANIZED HEALTH CARE EDUCATION/TRAINING PROGRAM

## 2025-05-19 PROCEDURE — 4010F ACE/ARB THERAPY RXD/TAKEN: CPT | Mod: CPTII,S$GLB,, | Performed by: STUDENT IN AN ORGANIZED HEALTH CARE EDUCATION/TRAINING PROGRAM

## 2025-05-19 PROCEDURE — 99214 OFFICE O/P EST MOD 30 MIN: CPT | Mod: S$GLB,,, | Performed by: STUDENT IN AN ORGANIZED HEALTH CARE EDUCATION/TRAINING PROGRAM

## 2025-05-19 PROCEDURE — 1159F MED LIST DOCD IN RCRD: CPT | Mod: CPTII,S$GLB,, | Performed by: STUDENT IN AN ORGANIZED HEALTH CARE EDUCATION/TRAINING PROGRAM

## 2025-05-19 RX ORDER — CETIRIZINE HYDROCHLORIDE 10 MG/1
20 TABLET ORAL DAILY
Qty: 180 TABLET | Refills: 3 | Status: SHIPPED | OUTPATIENT
Start: 2025-05-19 | End: 2026-05-14

## 2025-07-10 ENCOUNTER — PATIENT MESSAGE (OUTPATIENT)
Dept: BARIATRICS | Facility: CLINIC | Age: 50
End: 2025-07-10
Payer: COMMERCIAL

## 2025-07-11 ENCOUNTER — OFFICE VISIT (OUTPATIENT)
Dept: URGENT CARE | Facility: CLINIC | Age: 50
End: 2025-07-11
Payer: COMMERCIAL

## 2025-07-11 VITALS
DIASTOLIC BLOOD PRESSURE: 89 MMHG | HEART RATE: 69 BPM | RESPIRATION RATE: 17 BRPM | HEIGHT: 63 IN | BODY MASS INDEX: 43.41 KG/M2 | SYSTOLIC BLOOD PRESSURE: 127 MMHG | OXYGEN SATURATION: 98 % | TEMPERATURE: 98 F | WEIGHT: 245 LBS

## 2025-07-11 DIAGNOSIS — R10.84 GENERALIZED ABDOMINAL PAIN: ICD-10-CM

## 2025-07-11 DIAGNOSIS — R51.9 ACUTE NONINTRACTABLE HEADACHE, UNSPECIFIED HEADACHE TYPE: Primary | ICD-10-CM

## 2025-07-11 DIAGNOSIS — R11.0 NAUSEA: ICD-10-CM

## 2025-07-11 LAB
CTP QC/QA: YES
CTP QC/QA: YES
FLUAV AG NPH QL: NEGATIVE
FLUBV AG NPH QL: NEGATIVE
SARS-COV+SARS-COV-2 AG RESP QL IA.RAPID: NEGATIVE

## 2025-07-11 RX ORDER — ONDANSETRON 4 MG/1
4 TABLET, ORALLY DISINTEGRATING ORAL
Status: COMPLETED | OUTPATIENT
Start: 2025-07-11 | End: 2025-07-11

## 2025-07-11 RX ORDER — DEXAMETHASONE SODIUM PHOSPHATE 4 MG/ML
8 INJECTION, SOLUTION INTRA-ARTICULAR; INTRALESIONAL; INTRAMUSCULAR; INTRAVENOUS; SOFT TISSUE
Status: COMPLETED | OUTPATIENT
Start: 2025-07-11 | End: 2025-07-11

## 2025-07-11 RX ORDER — KETOROLAC TROMETHAMINE 30 MG/ML
60 INJECTION, SOLUTION INTRAMUSCULAR; INTRAVENOUS
Status: COMPLETED | OUTPATIENT
Start: 2025-07-11 | End: 2025-07-11

## 2025-07-11 RX ORDER — ONDANSETRON 4 MG/1
4 TABLET, ORALLY DISINTEGRATING ORAL EVERY 8 HOURS PRN
Qty: 12 TABLET | Refills: 0 | Status: SHIPPED | OUTPATIENT
Start: 2025-07-11

## 2025-07-11 RX ADMIN — ONDANSETRON 4 MG: 4 TABLET, ORALLY DISINTEGRATING ORAL at 02:07

## 2025-07-11 RX ADMIN — KETOROLAC TROMETHAMINE 60 MG: 30 INJECTION, SOLUTION INTRAMUSCULAR; INTRAVENOUS at 03:07

## 2025-07-11 RX ADMIN — DEXAMETHASONE SODIUM PHOSPHATE 8 MG: 4 INJECTION, SOLUTION INTRA-ARTICULAR; INTRALESIONAL; INTRAMUSCULAR; INTRAVENOUS; SOFT TISSUE at 03:07

## 2025-07-11 NOTE — PROGRESS NOTES
"Subjective:      Patient ID: Kitty Jennings is a 50 y.o. female.    Vitals:  height is 5' 3" (1.6 m) and weight is 111.1 kg (245 lb). Her temperature is 97.8 °F (36.6 °C). Her blood pressure is 127/89 and her pulse is 69. Her respiration is 17 and oxygen saturation is 98%.     Chief Complaint: Nausea    Patient is a 50 year old female with PMH of anemia, diverticulosis, GERD, migraines, HTN, morbid obesity presents to clinic today for evaluation of nausea, fatigue, abdomen pain, and headache for the past 2-3 days. Patient states last BM was today and her stool was harder than normal. Patient states she has not taken any medication for symptom relief. Patient reports she take omeprazole daily for GERD. Patient reports she ate some greasy ribs which she doesn't normally eat, but patient states her abdomen was hurting prior to this. Patient states she has not seen her GI dr in 2-3 years. Patient is also taking wegovy injection and took her last injection on 5 days ago and has not increased her dosage or had any side effects from injections. Patient states she hasn't had a migraine in quite sometime, but states every time she has a headache she always has nausea following due to the pain. Patient denies sensitivity to light or sound.       Nausea  This is a new problem. The current episode started in the past 7 days. The problem occurs constantly. The problem has been unchanged. Associated symptoms include abdominal pain, fatigue, headaches and nausea. Pertinent negatives include no chest pain, chills, congestion, coughing, diaphoresis, fever, myalgias, rash, sore throat or vomiting. Nothing aggravates the symptoms. She has tried nothing for the symptoms.       Constitution: Positive for fatigue. Negative for chills, sweating and fever.   HENT:  Negative for ear pain, congestion and sore throat.    Neck: neck negative.   Cardiovascular: Negative.  Negative for chest pain and palpitations.   Eyes: Negative.  "   Respiratory: Negative.  Negative for chest tightness, cough and shortness of breath.    Gastrointestinal:  Positive for abdominal pain and nausea. Negative for vomiting and diarrhea.   Endocrine: negative.   Genitourinary: Negative.    Musculoskeletal: Negative.  Negative for muscle ache.   Skin: Negative.  Negative for color change, pale, rash and erythema.   Allergic/Immunologic: Negative.  Positive for sneezing.   Neurological:  Positive for headaches. Negative for dizziness, light-headedness, passing out, disorientation and altered mental status.   Hematologic/Lymphatic: Negative.    Psychiatric/Behavioral: Negative.  Negative for altered mental status, disorientation and confusion.       Objective:     Physical Exam   Constitutional: She is oriented to person, place, and time. She appears well-developed. She is cooperative.  Non-toxic appearance. She does not appear ill. No distress.   HENT:   Head: Normocephalic and atraumatic.   Ears:   Right Ear: Hearing, tympanic membrane, external ear and ear canal normal.   Left Ear: Hearing, tympanic membrane, external ear and ear canal normal.   Nose: Nose normal. No mucosal edema, rhinorrhea, sinus tenderness, nasal deformity or congestion. No epistaxis. Right sinus exhibits no maxillary sinus tenderness and no frontal sinus tenderness. Left sinus exhibits no maxillary sinus tenderness and no frontal sinus tenderness.   Mouth/Throat: Uvula is midline, oropharynx is clear and moist and mucous membranes are normal. Mucous membranes are moist. No trismus in the jaw. Normal dentition. No uvula swelling. No oropharyngeal exudate or posterior oropharyngeal erythema. Oropharynx is clear.   Eyes: Conjunctivae and lids are normal. Pupils are equal, round, and reactive to light. Right eye exhibits no discharge. Left eye exhibits no discharge. No scleral icterus.   Neck: Trachea normal and phonation normal. Neck supple. No neck rigidity present.   Cardiovascular: Normal rate,  regular rhythm, normal heart sounds and normal pulses.   Pulmonary/Chest: Effort normal and breath sounds normal. No respiratory distress. She has no wheezes. She has no rhonchi. She has no rales.   Abdominal: Normal appearance and bowel sounds are normal. She exhibits no distension and no mass. Soft. There is no abdominal tenderness. There is no rebound, no guarding, no tenderness at McBurney's point, negative Stokes's sign, no left CVA tenderness, negative Rovsing's sign, negative psoas sign, no right CVA tenderness and negative obturator sign. No hernia.   Musculoskeletal: Normal range of motion.         General: Normal range of motion.      Cervical back: She exhibits no tenderness.   Lymphadenopathy:     She has no cervical adenopathy.   Neurological: no focal deficit. She is alert and oriented to person, place, and time. She has normal strength. She exhibits normal muscle tone. Gait and coordination normal.   Skin: Skin is warm, dry, intact, not diaphoretic, not pale and no rash. Capillary refill takes less than 2 seconds. No erythema   Psychiatric: Her speech is normal and behavior is normal. Judgment and thought content normal.   Nursing note and vitals reviewed.      Assessment:     1. Acute nonintractable headache, unspecified headache type    2. Nausea    3. Generalized abdominal pain        Plan:       Acute nonintractable headache, unspecified headache type    Nausea  -     SARS Coronavirus 2 Antigen, POCT Manual Read  -     POCT Influenza A/B Rapid Antigen  -     X-Ray Abdomen AP 1 View; Future; Expected date: 07/11/2025    Generalized abdominal pain  -     CT Abdomen Pelvis W Wo Contrast; Future; Expected date: 07/11/2025    Other orders  -     ondansetron (ZOFRAN-ODT) 4 MG TbDL; Take 1 tablet (4 mg total) by mouth every 8 (eight) hours as needed (nausea).  Dispense: 12 tablet; Refill: 0  -     ondansetron disintegrating tablet 4 mg  -     ketorolac injection 60 mg  -     dexAMETHasone injection 8  mg  -     ondansetron (ZOFRAN-ODT) 4 MG TbDL; Take 1 tablet (4 mg total) by mouth every 8 (eight) hours as needed (nausea).  Dispense: 12 tablet; Refill: 0                  Abdominal x-ray:      FINDINGS:  The bowel gas pattern is nonobstructed.  There are no abnormal calcifications.  The lung bases are clear.  The bones are intact.     Impression:  No acute abdominal process.    Toradol 60 mg IM, Decadron 8 mg IM, and Zofran 4 mg SL in clinic for migraine headache.  Patient does not have  in clinic.  Patient tolerated well.  No complications noted.  Take medications as prescribed.   checked through epic.  Recommend going home and sleeping in a dark cold room.  [place] diet, avoid spicy, greasy foods.   Follow-up with PCP in 1-2 days.    Return to clinic as needed.    To ED for any continued, new changed or acutely worsening symptoms.  Patient in agreement with plan of care.

## 2025-07-15 ENCOUNTER — TELEPHONE (OUTPATIENT)
Dept: BARIATRICS | Facility: CLINIC | Age: 50
End: 2025-07-15
Payer: COMMERCIAL

## 2025-07-15 ENCOUNTER — OFFICE VISIT (OUTPATIENT)
Dept: BARIATRICS | Facility: CLINIC | Age: 50
End: 2025-07-15
Payer: COMMERCIAL

## 2025-07-15 VITALS
HEART RATE: 76 BPM | WEIGHT: 233.81 LBS | SYSTOLIC BLOOD PRESSURE: 130 MMHG | BODY MASS INDEX: 41.43 KG/M2 | DIASTOLIC BLOOD PRESSURE: 78 MMHG | HEIGHT: 63 IN | TEMPERATURE: 98 F | RESPIRATION RATE: 16 BRPM

## 2025-07-15 DIAGNOSIS — I10 PRIMARY HYPERTENSION: ICD-10-CM

## 2025-07-15 DIAGNOSIS — K21.9 GASTROESOPHAGEAL REFLUX DISEASE, UNSPECIFIED WHETHER ESOPHAGITIS PRESENT: ICD-10-CM

## 2025-07-15 DIAGNOSIS — E66.01 MORBID OBESITY: Primary | ICD-10-CM

## 2025-07-15 PROCEDURE — 1159F MED LIST DOCD IN RCRD: CPT | Mod: CPTII,S$GLB,, | Performed by: NURSE PRACTITIONER

## 2025-07-15 PROCEDURE — 99999 PR PBB SHADOW E&M-EST. PATIENT-LVL IV: CPT | Mod: PBBFAC,,, | Performed by: NURSE PRACTITIONER

## 2025-07-15 PROCEDURE — 3075F SYST BP GE 130 - 139MM HG: CPT | Mod: CPTII,S$GLB,, | Performed by: NURSE PRACTITIONER

## 2025-07-15 PROCEDURE — 99214 OFFICE O/P EST MOD 30 MIN: CPT | Mod: S$GLB,,, | Performed by: NURSE PRACTITIONER

## 2025-07-15 PROCEDURE — 4010F ACE/ARB THERAPY RXD/TAKEN: CPT | Mod: CPTII,S$GLB,, | Performed by: NURSE PRACTITIONER

## 2025-07-15 PROCEDURE — 3008F BODY MASS INDEX DOCD: CPT | Mod: CPTII,S$GLB,, | Performed by: NURSE PRACTITIONER

## 2025-07-15 PROCEDURE — 1160F RVW MEDS BY RX/DR IN RCRD: CPT | Mod: CPTII,S$GLB,, | Performed by: NURSE PRACTITIONER

## 2025-07-15 PROCEDURE — 3078F DIAST BP <80 MM HG: CPT | Mod: CPTII,S$GLB,, | Performed by: NURSE PRACTITIONER

## 2025-07-15 RX ORDER — SEMAGLUTIDE 1.7 MG/.75ML
1.7 INJECTION, SOLUTION SUBCUTANEOUS
Qty: 3 ML | Refills: 2 | Status: SHIPPED | OUTPATIENT
Start: 2025-07-15

## 2025-07-15 NOTE — PROGRESS NOTES
Medically Supervised Weight Loss Documentation    Date of Visit: 07/15/2025    Patient: Kitty Jennings    Beginning Weight: 249    Last Weight: 242    Current Weight: 233 Current BMI: Body mass index is 41.42 kg/m².  Weight Change: -16    GOAL: 140 lbs    Vitals:   Vitals:    07/15/25 1514   BP: 130/78   Pulse: 76   Resp: 16   Temp: 98.1 °F (36.7 °C)         Comorbidities:   Past Medical History:   Diagnosis Date    Anemia     Diverticulosis     GERD (gastroesophageal reflux disease)     Hives of unknown origin     Hx of migraines     Hypertension        Medications:  Current Outpatient Medications on File Prior to Visit   Medication Sig Dispense Refill    cetirizine (ZYRTEC) 10 MG tablet Take 2 tablets (20 mg total) by mouth once daily. 180 tablet 3    esomeprazole (NEXIUM) 40 MG capsule Take 1 capsule (40 mg total) by mouth before breakfast. 90 capsule 1    hydroCHLOROthiazide (HYDRODIURIL) 25 MG tablet Take 1 tablet (25 mg total) by mouth once daily. 90 tablet 1    olmesartan (BENICAR) 40 MG tablet Take 1 tablet (40 mg total) by mouth once daily. 90 tablet 1    ondansetron (ZOFRAN-ODT) 4 MG TbDL Take 1 tablet (4 mg total) by mouth every 8 (eight) hours as needed (nausea). 12 tablet 0    ondansetron (ZOFRAN-ODT) 4 MG TbDL Take 1 tablet (4 mg total) by mouth every 8 (eight) hours as needed (nausea). 12 tablet 0    propranoloL (INDERAL LA) 160 mg 24 hr capsule Take 1 capsule (160 mg total) by mouth once daily. 90 capsule 1    [DISCONTINUED] semaglutide, weight loss, (WEGOVY) 1 mg/0.5 mL PnIj Inject 1 mg into the skin every 7 days. 2 mL 2    [DISCONTINUED] fluticasone propionate (FLONASE) 50 mcg/actuation nasal spray 1 spray (50 mcg total) by Each Nostril route 2 (two) times daily. Point up and slightly outward toward ear when spraying to avoid irritating nasal septum. (Patient not taking: Reported on 7/11/2025) 9.9 mL 2     No current facility-administered medications on file prior to visit.         Tanita Scale  Body Composition:  Fat Percent: 49.5 %  Fat Mass:  115.8 lb  FFM:  118 lb  TBW: 86 lb  TBW %:  36.8 %  BMR: 1687 kcal    Diet Education Discussed: Recommend high protein, low carb meals- mainly meats and vegetables.    Busy at work and just skip meals/water  Breakfast:  premier protein shake (when out-skips meal)- no appetite in AM  Lunch:  pack skip typically- lean cuisine, pre-made salad with caesar dressing  Dinner:  Fried Catfish from store pre-made & broccoli cheese/rice,   Rarely eat a plate of food  Water: 2 bottles (16 oz)-   PJ coffee- protein blended  Unsweet Green Tea with honey lemon with no ice  Drink with straw  Not eating/drinking together- hours before drink      MVI:   B12  Vit D  Biotin  MVI without iron    Exercise: Recommend cardiovascular exercise, get HR over 100 for 20 minutes 3 times per week  Helping brother/niece move    Behavorial/Counseling Plan for patient:  Falls reviewed with patient  Continue prescribed medications  Tanita scale results reviewed with patient  Decreased 6 lb fat  Decreased 2 lb muscle  Decreased 1 lb water  Discussed muscle vs fat   Do not exceed 1500 calories/day  Diet: continue protocol  Focus on small, frequent meals with eating high protein, low carb  Clear, protein powder added to water, typically 20 gm protein- brands like Isopure Fusion, Oath, Seeq  Unflavored protein, Isopure, can add 25g to foods   Low carb, high protein foods  Do not skip meals- replace with protein shake  Creat a better work/eating plan  Increase Protein to 60 gm/day at minimum  Transition to Low calorie breads- nenita Yadav  Track foods with MEL- 1/2 daily intake should be protein  Exercise:   Increase activity as much as possible- walk for part of lunch  Vitamins:   Continue with MVI       Weight Loss Options  Discussed Surgical and Medical Weight loss- Previously  Bariatric Surgery- Gastric Sleeve 9/11/2013 with 100 lb weight loss  Discussed Previous Weight Loss Attempts & PO vs Injectables  for MWL  Topiramate- before bariatric surgery, did not like side effects with 'brain fog'  Phentermine/Phendimetrazine or other Amphetamines  EKG reviewed  Previous use before bariatric surgery with minimal change    Injectables- not covered without 3 weight loss attempts  12/16/25- Medical Weight Loss Plan  1/2 tablet of Phentermine (37.5 mg) per day x 1 month, the increase to full tablet  Create a better work/eating plan & Do not skip meals  Surgical Weight loss-  hx of sleeve      2/10/2025-   Discussed changes with [facility] Federal coverage and out of pocket cost for injectables  Phentermine previously 1/2 tablet in AM for month (December 2024), increased to full tablet for 1 month (January 2025)  Injectables  Weight loss medications selection: Semaglutide (Wegovy)   Aware that will need prior authorization for medication, which can take some times  Aware medication not formulary at primary pharmacy and might have to change pharmacy if they do not carry it   Denies history or family history of Medullary Thyroid Cancer or Multiple endocrine neoplasia syndrome  Discussion of manifestations of elevated calcitonin levels  Will start dosage at 0.25 mg subcutaneous for 4 weeks (weekly injections) Can continue to increase every 4 weeks with max dose of 2.4 mg  Discussion of small, frequent meals to prevent low blood sugars  Nutrition packet- given on previous visit     4/8/25  Increased activity at work sicne February with going back into work with meeting/going back in office  Decreased appetite with Wegovy- injections on Saturday  0.5 mg now, completed 2 injections- will titrate dose increase    Changes for today, 7/15/2025  Dose increase to 1 mg on previous visit, 3 month supply- now having injections Sunday  Seen in Friday at Physician for ha/nausea/constipation- improvement in symptoms  Constipation continues with Zofran use x 1 ODT  Dose increase 1.7 mg x 3 months- mary    Co morbidities:     1. Morbid obesity   semaglutide, weight loss, (WEGOVY) 1.7 mg/0.75 mL PnIj      2. BMI 40.0-44.9, adult  semaglutide, weight loss, (WEGOVY) 1.7 mg/0.75 mL PnIj      3. Primary hypertension  semaglutide, weight loss, (WEGOVY) 1.7 mg/0.75 mL PnIj      4. Gastroesophageal reflux disease, unspecified whether esophagitis present                : total time spent for visit: 34 minutes

## 2025-07-15 NOTE — PATIENT INSTRUCTIONS
-Recommend daily exercise as tolerated, adequate water intake (six 8-oz glasses of water daily), and high fiber diet.   -OTC fiber supplements are recommended if diet does not reach daily fiber goal (20-30 grams daily), such as Metamucil, Citrucel, or FiberCon (take as directed, separate from other oral medications by >2 hours).  -Recommend taking an OTC stool softener daily such as Colace as directed to avoid hard stools and straining with bowel movements   -Recommend Smooth Move tea as needed  -Recommend trying OTC MiraLax once daily (17g PO) as directed  - If no improvement with above recommendations, try intermittently dosed Dulcolax OTC as directed (every 3-4  days) PRN to facilitate bowel movements  -If still no improvement with these measures, call/follow-up       Clear, protein powder added to water, typically 20 gm protein- brands like Isopure Fusion, Oath, Seeq  Unflavored protein, Isopure, can add 25g to foods

## 2025-08-26 ENCOUNTER — TELEPHONE (OUTPATIENT)
Dept: FAMILY MEDICINE | Facility: CLINIC | Age: 50
End: 2025-08-26
Payer: COMMERCIAL

## 2025-09-02 ENCOUNTER — PATIENT MESSAGE (OUTPATIENT)
Dept: FAMILY MEDICINE | Facility: CLINIC | Age: 50
End: 2025-09-02
Payer: COMMERCIAL